# Patient Record
Sex: MALE | Race: WHITE | HISPANIC OR LATINO | Employment: FULL TIME | URBAN - METROPOLITAN AREA
[De-identification: names, ages, dates, MRNs, and addresses within clinical notes are randomized per-mention and may not be internally consistent; named-entity substitution may affect disease eponyms.]

---

## 2022-09-07 ENCOUNTER — OFFICE VISIT (OUTPATIENT)
Dept: INTERNAL MEDICINE CLINIC | Facility: CLINIC | Age: 57
End: 2022-09-07

## 2022-09-07 VITALS
SYSTOLIC BLOOD PRESSURE: 160 MMHG | RESPIRATION RATE: 17 BRPM | OXYGEN SATURATION: 99 % | HEIGHT: 71 IN | BODY MASS INDEX: 33.6 KG/M2 | DIASTOLIC BLOOD PRESSURE: 86 MMHG | HEART RATE: 78 BPM | WEIGHT: 240 LBS | TEMPERATURE: 98 F

## 2022-09-07 DIAGNOSIS — I10 BENIGN ESSENTIAL HYPERTENSION: Primary | ICD-10-CM

## 2022-09-07 DIAGNOSIS — G89.29 CHRONIC INTRACTABLE PAIN: ICD-10-CM

## 2022-09-07 DIAGNOSIS — M48.061 LUMBAR FORAMINAL STENOSIS: ICD-10-CM

## 2022-09-07 DIAGNOSIS — M51.36 DDD (DEGENERATIVE DISC DISEASE), LUMBAR: ICD-10-CM

## 2022-09-07 DIAGNOSIS — M51.26 LUMBAR DISC HERNIATION: ICD-10-CM

## 2022-09-07 DIAGNOSIS — G62.9 NEUROPATHY: ICD-10-CM

## 2022-09-07 PROBLEM — R33.9 INCOMPLETE EMPTYING OF BLADDER: Status: ACTIVE | Noted: 2022-04-06

## 2022-09-07 PROCEDURE — 99213 OFFICE O/P EST LOW 20 MIN: CPT | Performed by: INTERNAL MEDICINE

## 2022-09-07 RX ORDER — PREGABALIN 100 MG/1
100 CAPSULE ORAL 2 TIMES DAILY
COMMUNITY
End: 2022-10-10 | Stop reason: SDUPTHER

## 2022-09-07 RX ORDER — AMLODIPINE AND OLMESARTAN MEDOXOMIL 5; 40 MG/1; MG/1
1 TABLET ORAL DAILY
Qty: 90 TABLET | Refills: 1 | Status: SHIPPED | OUTPATIENT
Start: 2022-09-07

## 2022-09-07 RX ORDER — CARVEDILOL 6.25 MG/1
6.25 TABLET ORAL 2 TIMES DAILY WITH MEALS
COMMUNITY
End: 2022-10-10

## 2022-09-07 RX ORDER — ESCITALOPRAM OXALATE 20 MG/1
20 TABLET ORAL DAILY
COMMUNITY
End: 2022-10-10 | Stop reason: SDUPTHER

## 2022-09-07 RX ORDER — TRIAMTERENE AND HYDROCHLOROTHIAZIDE 37.5; 25 MG/1; MG/1
1 TABLET ORAL DAILY
COMMUNITY
End: 2022-10-10 | Stop reason: SDUPTHER

## 2022-09-07 RX ORDER — OXYCODONE AND ACETAMINOPHEN 10; 325 MG/1; MG/1
1 TABLET ORAL EVERY 6 HOURS PRN
Qty: 120 TABLET | Refills: 0 | Status: SHIPPED | OUTPATIENT
Start: 2022-09-07 | End: 2022-10-10 | Stop reason: SDUPTHER

## 2022-09-07 RX ORDER — MONTELUKAST SODIUM 10 MG/1
10 TABLET ORAL
COMMUNITY
End: 2022-10-10

## 2022-09-07 RX ORDER — OXYCODONE AND ACETAMINOPHEN 10; 325 MG/1; MG/1
1 TABLET ORAL EVERY 6 HOURS PRN
COMMUNITY
End: 2022-09-07 | Stop reason: SDUPTHER

## 2022-09-07 RX ORDER — FLUTICASONE PROPIONATE 50 MCG
1 SPRAY, SUSPENSION (ML) NASAL 2 TIMES DAILY
COMMUNITY
End: 2022-10-10 | Stop reason: SDUPTHER

## 2022-09-07 RX ORDER — CELECOXIB 200 MG/1
200 CAPSULE ORAL DAILY
COMMUNITY
End: 2022-09-16 | Stop reason: SDUPTHER

## 2022-09-07 RX ORDER — AMLODIPINE AND OLMESARTAN MEDOXOMIL 5; 40 MG/1; MG/1
1 TABLET ORAL DAILY
COMMUNITY
End: 2022-09-07 | Stop reason: SDUPTHER

## 2022-09-07 NOTE — PATIENT INSTRUCTIONS
CPt is symptom free for this problem    This diagnosis or problem is stable/well controlled  Patient is advised to continue same meds as outlined in medicine list  Pt is advised to continue to follow with specialist if they are following for this problme

## 2022-09-11 NOTE — ASSESSMENT & PLAN NOTE
Patient's pain without medication 9/10 with medication for over 10 with medication is able to work full-time and carry out activities

## 2022-09-11 NOTE — PROGRESS NOTES
Dr [facility] Office Visit Note  22     Aydin Diallo 64 y o  male MRN: [id number]  : 1965    Assessment:     1  Benign essential hypertension  Assessment & Plan:  Patient denies any headache difficulty breathing chest pain oral blood pressure for now seems to be under control continue Soni 540 and chlorthalidone 50/triamterene 25 mg daily along with Coreg close monitoring the blood pressure fidgeting dizziness shortness the blood pressure is lower side will discontinue Coreg discussed at length    Orders:  -     amlodipine-olmesartan (Soni) 5-40 MG; Take 1 tablet by mouth daily    2  Neuropathy  Assessment & Plan:  Symptoms seems to be controlled continue light weaker      3  DDD (degenerative disc disease), lumbar  Assessment & Plan:  Patient's pain is partially controlled about 60% relief with Celebrex continue Celebrex    Orders:  -     oxyCODONE-acetaminophen (PERCOCET)  mg per tablet; Take 1 tablet by mouth every 6 (six) hours as needed for severe pain Max Daily Amount: 4 tablets    4  Lumbar disc herniation  -     oxyCODONE-acetaminophen (PERCOCET)  mg per tablet; Take 1 tablet by mouth every 6 (six) hours as needed for severe pain Max Daily Amount: 4 tablets    5  Lumbar foraminal stenosis  Assessment & Plan:  Intractable pain without medication 9/10 with medication for over 10 patient is still working full-time and is able to function 90% as per patient with the medication at work continue current treatment    Orders:  -     oxyCODONE-acetaminophen (PERCOCET)  mg per tablet; Take 1 tablet by mouth every 6 (six) hours as needed for severe pain Max Daily Amount: 4 tablets    6  Chronic intractable pain  Assessment & Plan:  Patient's pain without medication /10 with medication for over 10 with medication is able to work full-time and carry out activities          Discussion Summary and Plan:   Today's care plan and medications were reviewed with patient in detail and all their questions answered to their satisfaction  Chief Complaint   Patient presents with    Hypertension    Back Pain    Pain     Pain management  Intractable back pain      Subjective:  Patient came in for follow-up with the pain management pain medication patient has been evaluated multiple times by pain management spine surgeon epidural injections in the past really minimal improvement recommended surgery patient should use refusing the surgery in lead 2 continue current management with the pain medicine patient's pain without medication is a 10/10 and with medication for over 10 and shaver to carry out day-to-day activities and also is able to work patient is working full-time no other new problems  BMI Counseling: Body mass index is 33 47 kg/m²  The BMI is above normal  Nutrition recommendations include decreasing portion sizes, encouraging healthy choices of fruits and vegetables, decreasing fast food intake, consuming healthier snacks, limiting drinks that contain sugar, moderation in carbohydrate intake, increasing intake of lean protein, reducing intake of saturated and trans fat and reducing intake of cholesterol  Exercise recommendations include exercising 3-5 times per week and strength training exercises  No pharmacotherapy was ordered  Patient referred to weight management  Rationale for BMI follow-up plan is due to patient being overweight or obese          PHQ-2/9 Depression Screening    Little interest or pleasure in doing things: 0 - not at all  Feeling down, depressed, or hopeless: 0 - not at all  PHQ-2 Score: 0  PHQ-2 Interpretation: Negative depression screen         The following portions of the patient's history were reviewed and updated as appropriate: allergies, current medications, past family history, past medical history, past social history, past surgical history and problem list     Review of Systems   Constitutional: Negative for activity change, appetite change, chills, diaphoresis, fatigue, fever and unexpected weight change  HENT: Negative for congestion, dental problem, drooling, ear discharge, ear pain, facial swelling, hearing loss, mouth sores, nosebleeds, postnasal drip, rhinorrhea, sinus pressure, sneezing, sore throat, tinnitus, trouble swallowing and voice change  Eyes: Negative for photophobia, pain, discharge, redness, itching and visual disturbance  Respiratory: Negative for apnea, cough, choking, chest tightness, shortness of breath, wheezing and stridor  Cardiovascular: Negative for chest pain, palpitations and leg swelling  Gastrointestinal: Negative for abdominal distention, abdominal pain, anal bleeding, blood in stool, constipation, diarrhea, nausea, rectal pain and vomiting  Endocrine: Negative for cold intolerance, heat intolerance, polydipsia, polyphagia and polyuria  Genitourinary: Negative for decreased urine volume, difficulty urinating, dysuria, enuresis, flank pain, frequency, genital sores, hematuria and urgency  Musculoskeletal: Positive for arthralgias, back pain, joint swelling and myalgias  Negative for gait problem, neck pain and neck stiffness  Skin: Negative for color change, pallor, rash and wound  Allergic/Immunologic: Negative  Negative for environmental allergies, food allergies and immunocompromised state  Neurological: Negative for dizziness, tremors, seizures, syncope, facial asymmetry, speech difficulty, weakness, light-headedness, numbness and headaches  Psychiatric/Behavioral: Negative for agitation, behavioral problems, confusion, decreased concentration, dysphoric mood, hallucinations, self-injury, sleep disturbance and suicidal ideas  The patient is not nervous/anxious and is not hyperactive            Historical Information   Patient Active Problem List   Diagnosis    Benign essential hypertension    Benign prostatic hyperplasia    DDD (degenerative disc disease), lumbar    Incomplete emptying of bladder    Lumbar disc herniation    Lumbar foraminal stenosis    Neuropathy    Chronic intractable pain     Past Medical History:   Diagnosis Date    Abdominal pain     Anxiety disorder     Arthritis     Back pain     Dizziness     Headache     Hypertension     Lightheadedness     Obesity     Palpitations     SOB (shortness of breath)      Past Surgical History:   Procedure Laterality Date    CHOLECYSTECTOMY      GASTRIC BYPASS       Social History     Substance and Sexual Activity   Alcohol Use None    Comment: none per Brenda [place]     Social History     Substance and Sexual Activity   Drug Use Not on file    Comment: none per Brenda [place]ers[place]     Social History     Tobacco Use   Smoking Status Never Smoker   Smokeless Tobacco Never Used     History reviewed  No pertinent family history    Health Maintenance Due   Topic    Hepatitis C Screening     Depression Screening     HIV Screening     BMI: Followup Plan     Annual Physical     Colorectal Cancer Screening     COVID-19 Vaccine (4 - Booster for Moderna series)    Influenza Vaccine (1)      Meds/Allergies       Current Outpatient Medications:     amlodipine-olmesartan (Soni) 5-40 MG, Take 1 tablet by mouth daily, Disp: 90 tablet, Rfl: 1    carvedilol (COREG) 6 25 mg tablet, Take 6 25 mg by mouth 2 (two) times a day with meals, Disp: , Rfl:     celecoxib (CeleBREX) 200 mg capsule, Take 200 mg by mouth daily, Disp: , Rfl:     escitalopram (LEXAPRO) 20 mg tablet, Take 20 mg by mouth daily, Disp: , Rfl:     fluticasone (FLONASE) 50 mcg/act nasal spray, 1 spray into each nostril 2 (two) times a day, Disp: , Rfl:     montelukast (Singulair) 10 mg tablet, Take 10 mg by mouth daily at bedtime, Disp: , Rfl:     oxyCODONE-acetaminophen (PERCOCET)  mg per tablet, Take 1 tablet by mouth every 6 (six) hours as needed for severe pain Max Daily Amount: 4 tablets, Disp: 120 tablet, Rfl: 0    pregabalin (LYRICA) 100 mg capsule, Take 100 mg by mouth 2 (two) times a day, Disp: , Rfl:     triamterene-hydrochlorothiazide (MAXZIDE-25) 37 5-25 mg per tablet, Take 1 tablet by mouth daily, Disp: , Rfl:       Objective:    Vitals:   /86 (BP Location: Left arm, Patient Position: Sitting, Cuff Size: Standard)   Pulse 78   Temp 98 °F (36 7 °C) (Temporal)   Resp 17   Ht 5' 11" (1 803 m)   Wt 109 kg (240 lb)   SpO2 99%   BMI 33 47 kg/m²   Body mass index is 33 47 kg/m²  Vitals:    09/07/22 1707   Weight: 109 kg (240 lb)       Physical Exam  Constitutional:       General: He is not in acute distress  Appearance: He is well-developed  He is not ill-appearing, toxic-appearing or diaphoretic  HENT:      Head: Normocephalic and atraumatic  Right Ear: External ear normal       Left Ear: External ear normal       Nose: Nose normal       Mouth/Throat:      Pharynx: No oropharyngeal exudate  Eyes:      General: Lids are normal  Lids are everted, no foreign bodies appreciated  No scleral icterus  Right eye: No discharge  Left eye: No discharge  Conjunctiva/sclera: Conjunctivae normal       Pupils: Pupils are equal, round, and reactive to light  Neck:      Thyroid: No thyromegaly  Vascular: Normal carotid pulses  No carotid bruit, hepatojugular reflux or JVD  Trachea: No tracheal tenderness or tracheal deviation  Cardiovascular:      Rate and Rhythm: Normal rate and regular rhythm  Pulses: Normal pulses  Heart sounds: Normal heart sounds  No murmur heard  No friction rub  No gallop  Pulmonary:      Effort: Pulmonary effort is normal  No respiratory distress  Breath sounds: Normal breath sounds  No stridor  No wheezing or rales  Chest:      Chest wall: No tenderness  Abdominal:      General: Bowel sounds are normal  There is no distension  Palpations: Abdomen is soft  There is no mass  Tenderness: There is no abdominal tenderness  There is no guarding or rebound     Musculoskeletal:         General: No tenderness or deformity  Normal range of motion  Cervical back: Normal range of motion and neck supple  No edema, erythema or rigidity  No spinous process tenderness or muscular tenderness  Normal range of motion  Lymphadenopathy:      Head:      Right side of head: No submental, submandibular, tonsillar, preauricular or posterior auricular adenopathy  Left side of head: No submental, submandibular, tonsillar, preauricular, posterior auricular or occipital adenopathy  Cervical: No cervical adenopathy  Right cervical: No superficial, deep or posterior cervical adenopathy  Left cervical: No superficial, deep or posterior cervical adenopathy  Upper Body:      Right upper body: No pectoral adenopathy  Left upper body: No pectoral adenopathy  Skin:     General: Skin is warm and dry  Coloration: Skin is not pale  Findings: No erythema or rash  Neurological:      Mental Status: He is alert and oriented to person, place, and time  Cranial Nerves: No cranial nerve deficit  Sensory: No sensory deficit  Motor: No tremor, abnormal muscle tone or seizure activity  Coordination: Coordination normal       Gait: Gait normal       Deep Tendon Reflexes: Reflexes are normal and symmetric  Reflexes normal    Psychiatric:         Behavior: Behavior normal          Thought Content: Thought content normal          Judgment: Judgment normal          Lab Review   No visits with results within 2 Month(s) from this visit  Latest known visit with results is:   No results found for any previous visit  Patient Instructions   CPt is symptom free for this problem    This diagnosis or problem is stable/well controlled  Patient is advised to continue same meds as outlined in medicine list  Pt is advised to continue to follow with specialist if they are following for this daren Platt MD        "This note has been constructed using a voice recognition system  Therefore there may be syntax, spelling, and/or grammatical errors   Please call if you have any questions  "

## 2022-09-11 NOTE — ASSESSMENT & PLAN NOTE
Intractable pain without medication 9/10 with medication for over 10 patient is still working full-time and is able to function 90% as per patient with the medication at work continue current treatment

## 2022-09-11 NOTE — ASSESSMENT & PLAN NOTE
Patient denies any headache difficulty breathing chest pain oral blood pressure for now seems to be under control continue Osni 540 and chlorthalidone 50/triamterene 25 mg daily along with Coreg close monitoring the blood pressure fidgeting dizziness shortness the blood pressure is lower side will discontinue Coreg discussed at length

## 2022-09-16 DIAGNOSIS — N40.0 BENIGN PROSTATIC HYPERPLASIA, UNSPECIFIED WHETHER LOWER URINARY TRACT SYMPTOMS PRESENT: Primary | ICD-10-CM

## 2022-09-16 DIAGNOSIS — N40.0 BENIGN PROSTATIC HYPERPLASIA, UNSPECIFIED WHETHER LOWER URINARY TRACT SYMPTOMS PRESENT: ICD-10-CM

## 2022-09-16 DIAGNOSIS — M51.36 DDD (DEGENERATIVE DISC DISEASE), LUMBAR: ICD-10-CM

## 2022-09-16 RX ORDER — TAMSULOSIN HYDROCHLORIDE 0.4 MG/1
0.4 CAPSULE ORAL
COMMUNITY
Start: 2022-04-06 | End: 2022-09-16 | Stop reason: SDUPTHER

## 2022-09-16 RX ORDER — CELECOXIB 200 MG/1
200 CAPSULE ORAL DAILY
Qty: 30 CAPSULE | Refills: 2 | Status: SHIPPED | OUTPATIENT
Start: 2022-09-16 | End: 2022-09-16 | Stop reason: SDUPTHER

## 2022-09-16 RX ORDER — CELECOXIB 200 MG/1
200 CAPSULE ORAL DAILY
Qty: 30 CAPSULE | Refills: 2 | Status: SHIPPED | OUTPATIENT
Start: 2022-09-16 | End: 2022-10-10 | Stop reason: SDUPTHER

## 2022-09-16 RX ORDER — TAMSULOSIN HYDROCHLORIDE 0.4 MG/1
0.4 CAPSULE ORAL
Qty: 30 CAPSULE | Refills: 2 | Status: SHIPPED | OUTPATIENT
Start: 2022-09-16 | End: 2022-09-16 | Stop reason: SDUPTHER

## 2022-09-16 RX ORDER — TAMSULOSIN HYDROCHLORIDE 0.4 MG/1
0.4 CAPSULE ORAL
Qty: 30 CAPSULE | Refills: 2 | Status: SHIPPED | OUTPATIENT
Start: 2022-09-16 | End: 2022-10-10 | Stop reason: SDUPTHER

## 2022-10-10 ENCOUNTER — OFFICE VISIT (OUTPATIENT)
Dept: INTERNAL MEDICINE CLINIC | Facility: CLINIC | Age: 57
End: 2022-10-10

## 2022-10-10 VITALS
SYSTOLIC BLOOD PRESSURE: 132 MMHG | OXYGEN SATURATION: 98 % | DIASTOLIC BLOOD PRESSURE: 78 MMHG | BODY MASS INDEX: 33.46 KG/M2 | WEIGHT: 239 LBS | HEIGHT: 71 IN | HEART RATE: 79 BPM

## 2022-10-10 DIAGNOSIS — M51.36 DDD (DEGENERATIVE DISC DISEASE), LUMBAR: ICD-10-CM

## 2022-10-10 DIAGNOSIS — J30.9 ALLERGIC RHINITIS, UNSPECIFIED SEASONALITY, UNSPECIFIED TRIGGER: ICD-10-CM

## 2022-10-10 DIAGNOSIS — M51.26 LUMBAR DISC HERNIATION: ICD-10-CM

## 2022-10-10 DIAGNOSIS — N40.0 BENIGN PROSTATIC HYPERPLASIA, UNSPECIFIED WHETHER LOWER URINARY TRACT SYMPTOMS PRESENT: ICD-10-CM

## 2022-10-10 DIAGNOSIS — F33.0 MILD EPISODE OF RECURRENT MAJOR DEPRESSIVE DISORDER (HCC): ICD-10-CM

## 2022-10-10 DIAGNOSIS — M48.061 LUMBAR FORAMINAL STENOSIS: ICD-10-CM

## 2022-10-10 DIAGNOSIS — H60.312 ACUTE DIFFUSE OTITIS EXTERNA OF LEFT EAR: ICD-10-CM

## 2022-10-10 DIAGNOSIS — I10 BENIGN ESSENTIAL HYPERTENSION: Primary | ICD-10-CM

## 2022-10-10 DIAGNOSIS — G62.9 NEUROPATHY: ICD-10-CM

## 2022-10-10 DIAGNOSIS — G89.29 CHRONIC INTRACTABLE PAIN: ICD-10-CM

## 2022-10-10 DIAGNOSIS — F33.9 EPISODE OF RECURRENT MAJOR DEPRESSIVE DISORDER, UNSPECIFIED DEPRESSION EPISODE SEVERITY (HCC): ICD-10-CM

## 2022-10-10 PROCEDURE — 99214 OFFICE O/P EST MOD 30 MIN: CPT | Performed by: INTERNAL MEDICINE

## 2022-10-10 RX ORDER — TAMSULOSIN HYDROCHLORIDE 0.4 MG/1
0.4 CAPSULE ORAL
Qty: 90 CAPSULE | Refills: 0 | Status: SHIPPED | OUTPATIENT
Start: 2022-10-10

## 2022-10-10 RX ORDER — NEOMYCIN SULFATE, POLYMYXIN B SULFATE AND HYDROCORTISONE 10; 3.5; 1 MG/ML; MG/ML; [USP'U]/ML
3 SUSPENSION/ DROPS AURICULAR (OTIC) 4 TIMES DAILY
Qty: 10 ML | Refills: 0 | Status: SHIPPED | OUTPATIENT
Start: 2022-10-10 | End: 2022-10-17

## 2022-10-10 RX ORDER — FLUTICASONE PROPIONATE 50 MCG
1 SPRAY, SUSPENSION (ML) NASAL 2 TIMES DAILY
Qty: 15.8 ML | Refills: 6 | Status: SHIPPED | OUTPATIENT
Start: 2022-10-10

## 2022-10-10 RX ORDER — OXYCODONE AND ACETAMINOPHEN 10; 325 MG/1; MG/1
1 TABLET ORAL EVERY 6 HOURS PRN
Qty: 120 TABLET | Refills: 0 | Status: SHIPPED | OUTPATIENT
Start: 2022-10-10

## 2022-10-10 RX ORDER — TRIAMTERENE AND HYDROCHLOROTHIAZIDE 37.5; 25 MG/1; MG/1
1 TABLET ORAL DAILY
Qty: 90 TABLET | Refills: 0 | Status: SHIPPED | OUTPATIENT
Start: 2022-10-10

## 2022-10-10 RX ORDER — ESCITALOPRAM OXALATE 20 MG/1
20 TABLET ORAL DAILY
Qty: 90 TABLET | Refills: 0 | Status: SHIPPED | OUTPATIENT
Start: 2022-10-10

## 2022-10-10 RX ORDER — CELECOXIB 200 MG/1
200 CAPSULE ORAL DAILY
Qty: 90 CAPSULE | Refills: 0 | Status: SHIPPED | OUTPATIENT
Start: 2022-10-10

## 2022-10-10 RX ORDER — PREGABALIN 100 MG/1
100 CAPSULE ORAL 2 TIMES DAILY
Qty: 180 CAPSULE | Refills: 0 | Status: SHIPPED | OUTPATIENT
Start: 2022-10-10

## 2022-10-10 NOTE — ASSESSMENT & PLAN NOTE
Intractable pain lower back radiating lower extremity patient is on Percocet 10/325 every 6 hour patient is working with this regimen full time and is able to carry out day-to-day activities

## 2022-10-10 NOTE — ASSESSMENT & PLAN NOTE
Intractable pain without medication 9/10 with medication for over 10 patient is still working full-time and is able to function pain relief 90% as per patient with the medication at work continue current treatment

## 2022-10-10 NOTE — PROGRESS NOTES
Dr Elmira Walsh Office Visit Note  10/10/22     [facility] Shay 64 y o  male MRN: [id number]  : 1965    Assessment:     1  Benign essential hypertension  Assessment & Plan:  Patient denies any headache difficulty breathing chest pain oral blood pressure for now seems to be under control continue Soni 540 and chlorthalidone 50/triamterene 25 mg daily along with Coreg close monitoring the blood pressure fidgeting dizziness patient's blood pressure improved dizziness improved after discontinuing Coreg    Orders:  -     triamterene-hydrochlorothiazide (MAXZIDE-25) 37 5-25 mg per tablet; Take 1 tablet by mouth daily    2  Benign prostatic hyperplasia, unspecified whether lower urinary tract symptoms present  Assessment & Plan:  Continue Flomax recommended to be seen by urologist partial symptom relief    Orders:  -     celecoxib (CeleBREX) 200 mg capsule; Take 1 capsule (200 mg total) by mouth daily  -     tamsulosin (FLOMAX) 0 4 mg; Take 1 capsule (0 4 mg total) by mouth daily with dinner    3  Neuropathy  -     pregabalin (LYRICA) 100 mg capsule; Take 1 capsule (100 mg total) by mouth 2 (two) times a day    4  Allergic rhinitis, unspecified seasonality, unspecified trigger  -     fluticasone (FLONASE) 50 mcg/act nasal spray; 1 spray into each nostril 2 (two) times a day    5  Episode of recurrent major depressive disorder, unspecified depression episode severity ([address] )  -     escitalopram (LEXAPRO) 20 mg tablet; Take 1 tablet (20 mg total) by mouth daily    6  Chronic intractable pain  -     escitalopram (LEXAPRO) 20 mg tablet; Take 1 tablet (20 mg total) by mouth daily    7  DDD (degenerative disc disease), lumbar  Assessment & Plan:  Patient's pain is partially controlled about 60% relief with Celebrex continue Celebrex    Orders:  -     oxyCODONE-acetaminophen (PERCOCET)  mg per tablet; Take 1 tablet by mouth every 6 (six) hours as needed for severe pain Max Daily Amount: 4 tablets    8   Lumbar disc herniation  Assessment & Plan:  Intractable pain lower back radiating lower extremity patient is on Percocet 10/325 every 6 hour patient is working with this regimen full time and is able to carry out day-to-day activities    Orders:  -     oxyCODONE-acetaminophen (PERCOCET)  mg per tablet; Take 1 tablet by mouth every 6 (six) hours as needed for severe pain Max Daily Amount: 4 tablets    9  Lumbar foraminal stenosis  Assessment & Plan:  Intractable pain without medication 9/10 with medication for over 10 patient is still working full-time and is able to function pain relief 90% as per patient with the medication at work continue current treatment    Orders:  -     oxyCODONE-acetaminophen (PERCOCET)  mg per tablet; Take 1 tablet by mouth every 6 (six) hours as needed for severe pain Max Daily Amount: 4 tablets    10  Acute diffuse otitis externa of left ear  -     neomycin-polymyxin-hydrocortisone (CORTISPORIN) 0 35%-10,000 units/mL-1% otic suspension; Administer 3 drops into the left ear 4 (four) times a day for 7 days    11  Mild episode of recurrent major depressive disorder [facility])        Discussion Summary and Plan: Today's care plan and medications were reviewed with patient in detail and all their questions answered to their satisfaction  No chief complaint on file       Subjective:  Patient came in complaining of left earache for last few day was trying to clean the ER with Q-tip also intractable back pain awaiting lower extremity taking Percocet 10/325 every 6 hour with this regimen patient has significant pain control able to work full-time and carry out day-to-day activities patient's blood pressure improved after discontinuing Coreg since the last visit was causing dizziness due to hypotensive hypotension which is resolved now      The following portions of the patient's history were reviewed and updated as appropriate: allergies, current medications, past family history, past medical history, past social history, past surgical history and problem list     Review of Systems   Constitutional: Negative for activity change, appetite change, chills, diaphoresis, fatigue, fever and unexpected weight change  HENT: Negative for congestion, dental problem, drooling, ear discharge, ear pain, facial swelling, hearing loss, mouth sores, nosebleeds, postnasal drip, rhinorrhea, sinus pressure, sneezing, sore throat, tinnitus, trouble swallowing and voice change  Eyes: Negative for photophobia, pain, discharge, redness, itching and visual disturbance  Respiratory: Negative for apnea, cough, choking, chest tightness, shortness of breath, wheezing and stridor  Cardiovascular: Negative for chest pain, palpitations and leg swelling  Gastrointestinal: Positive for abdominal distention  Negative for abdominal pain, anal bleeding, blood in stool, constipation, diarrhea, nausea, rectal pain and vomiting  Endocrine: Negative for cold intolerance, heat intolerance, polydipsia, polyphagia and polyuria  Genitourinary: Negative for decreased urine volume, difficulty urinating, dysuria, enuresis, flank pain, frequency, genital sores, hematuria and urgency  Musculoskeletal: Positive for arthralgias, back pain, joint swelling and myalgias  Negative for gait problem, neck pain and neck stiffness  Skin: Negative for color change, pallor, rash and wound  Allergic/Immunologic: Negative  Negative for environmental allergies, food allergies and immunocompromised state  Neurological: Negative for dizziness, tremors, seizures, syncope, facial asymmetry, speech difficulty, weakness, light-headedness, numbness and headaches  Psychiatric/Behavioral: Negative for agitation, behavioral problems, confusion, decreased concentration, dysphoric mood, hallucinations, self-injury, sleep disturbance and suicidal ideas  The patient is not nervous/anxious and is not hyperactive            Historical Information   Patient Active Problem List   Diagnosis   • Benign essential hypertension   • Benign prostatic hyperplasia   • DDD (degenerative disc disease), lumbar   • Incomplete emptying of bladder   • Lumbar disc herniation   • Lumbar foraminal stenosis   • Neuropathy   • Chronic intractable pain   • Acute diffuse otitis externa of left ear   • Mild episode of recurrent major depressive disorder (HCC)     Past Medical History:   Diagnosis Date   • Abdominal pain    • Anxiety disorder    • Arthritis    • Back pain    • Dizziness    • Headache    • Hypertension    • Lightheadedness    • Obesity    • Palpitations    • SOB (shortness of breath)      Past Surgical History:   Procedure Laterality Date   • CHOLECYSTECTOMY     • GASTRIC BYPASS       Social History     Substance and Sexual Activity   Alcohol Use None    Comment: none per [place]     Social History     Substance and Sexual Activity   Drug Use Not on file    Comment: none per [place]     Social History     Tobacco Use   Smoking Status Never Smoker   Smokeless Tobacco Never Used     History reviewed  No pertinent family history    Health Maintenance Due   Topic   • Hepatitis C Screening    • HIV Screening    • Annual Physical    • Colorectal Cancer Screening    • COVID-19 Vaccine (4 - Booster for Moderna series)   • Influenza Vaccine (1)      Meds/Allergies       Current Outpatient Medications:   •  amlodipine-olmesartan (Soni) 5-40 MG, Take 1 tablet by mouth daily, Disp: 90 tablet, Rfl: 1  •  celecoxib (CeleBREX) 200 mg capsule, Take 1 capsule (200 mg total) by mouth daily, Disp: 90 capsule, Rfl: 0  •  escitalopram (LEXAPRO) 20 mg tablet, Take 1 tablet (20 mg total) by mouth daily, Disp: 90 tablet, Rfl: 0  •  fluticasone (FLONASE) 50 mcg/act nasal spray, 1 spray into each nostril 2 (two) times a day, Disp: 15 8 mL, Rfl: 6  •  neomycin-polymyxin-hydrocortisone (CORTISPORIN) 0 35%-10,000 units/mL-1% otic suspension, Administer 3 drops into the left ear 4 (four) times a day for 7 days, Disp: 10 mL, Rfl: 0  •  oxyCODONE-acetaminophen (PERCOCET)  mg per tablet, Take 1 tablet by mouth every 6 (six) hours as needed for severe pain Max Daily Amount: 4 tablets, Disp: 120 tablet, Rfl: 0  •  pregabalin (LYRICA) 100 mg capsule, Take 1 capsule (100 mg total) by mouth 2 (two) times a day, Disp: 180 capsule, Rfl: 0  •  tamsulosin (FLOMAX) 0 4 mg, Take 1 capsule (0 4 mg total) by mouth daily with dinner, Disp: 90 capsule, Rfl: 0  •  triamterene-hydrochlorothiazide (MAXZIDE-25) 37 5-25 mg per tablet, Take 1 tablet by mouth daily, Disp: 90 tablet, Rfl: 0      Objective:    Vitals:   /78 (BP Location: Right arm, Patient Position: Sitting, Cuff Size: Standard)   Pulse 79   Ht 5' 11" (1 803 m)   Wt 108 kg (239 lb)   SpO2 98%   BMI 33 33 kg/m²   Body mass index is 33 33 kg/m²  Vitals:    10/10/22 1651   Weight: 108 kg (239 lb)       Physical Exam  Constitutional:       General: He is not in acute distress  Appearance: He is well-developed  He is not ill-appearing, toxic-appearing or diaphoretic  HENT:      Head: Normocephalic and atraumatic  Right Ear: External ear normal       Left Ear: External ear normal       Nose: Nose normal       Mouth/Throat:      Pharynx: No oropharyngeal exudate  Eyes:      General: Lids are normal  Lids are everted, no foreign bodies appreciated  No scleral icterus  Right eye: No discharge  Left eye: No discharge  Conjunctiva/sclera: Conjunctivae normal       Pupils: Pupils are equal, round, and reactive to light  Neck:      Thyroid: No thyromegaly  Vascular: Normal carotid pulses  No carotid bruit, hepatojugular reflux or JVD  Trachea: No tracheal tenderness or tracheal deviation  Cardiovascular:      Rate and Rhythm: Normal rate and regular rhythm  Pulses: Normal pulses  Heart sounds: Normal heart sounds  No murmur heard  No friction rub  No gallop     Pulmonary:      Effort: Pulmonary effort is normal  No respiratory distress  Breath sounds: Normal breath sounds  No stridor  No wheezing or rales  Chest:      Chest wall: No tenderness  Abdominal:      General: Bowel sounds are normal  There is no distension  Palpations: Abdomen is soft  There is no mass  Tenderness: There is no abdominal tenderness  There is no guarding or rebound  Musculoskeletal:         General: Deformity present  No tenderness  Normal range of motion  Cervical back: Normal range of motion and neck supple  No edema, erythema or rigidity  No spinous process tenderness or muscular tenderness  Normal range of motion  Lymphadenopathy:      Head:      Right side of head: No submental, submandibular, tonsillar, preauricular or posterior auricular adenopathy  Left side of head: No submental, submandibular, tonsillar, preauricular, posterior auricular or occipital adenopathy  Cervical: No cervical adenopathy  Right cervical: No superficial, deep or posterior cervical adenopathy  Left cervical: No superficial, deep or posterior cervical adenopathy  Upper Body:      Right upper body: No pectoral adenopathy  Left upper body: No pectoral adenopathy  Skin:     General: Skin is warm and dry  Coloration: Skin is not pale  Findings: No erythema or rash  Neurological:      Mental Status: He is alert and oriented to person, place, and time  Cranial Nerves: No cranial nerve deficit  Sensory: No sensory deficit  Motor: No tremor, abnormal muscle tone or seizure activity  Coordination: Coordination normal       Gait: Gait abnormal       Deep Tendon Reflexes: Reflexes are normal and symmetric  Reflexes normal    Psychiatric:         Behavior: Behavior normal          Thought Content: Thought content normal          Judgment: Judgment normal          Lab Review   No visits with results within 2 Month(s) from this visit     Latest known visit with results is:   No results found for any previous visit  Patient Instructions   Follow-up with orthopedist and pain management      Pt          Brandon Keith        "This note has been constructed using a voice recognition system  Therefore there may be syntax, spelling, and/or grammatical errors   Please call if you have any questions  "

## 2022-10-10 NOTE — ASSESSMENT & PLAN NOTE
Patient denies any headache difficulty breathing chest pain oral blood pressure for now seems to be under control continue Soni 540 and chlorthalidone 50/triamterene 25 mg daily along with Coreg close monitoring the blood pressure fidgeting dizziness patient's blood pressure improved dizziness improved after discontinuing Coreg

## 2022-12-05 ENCOUNTER — OFFICE VISIT (OUTPATIENT)
Dept: INTERNAL MEDICINE CLINIC | Facility: CLINIC | Age: 57
End: 2022-12-05

## 2022-12-05 VITALS
HEIGHT: 71 IN | OXYGEN SATURATION: 97 % | DIASTOLIC BLOOD PRESSURE: 80 MMHG | WEIGHT: 238 LBS | HEART RATE: 92 BPM | RESPIRATION RATE: 16 BRPM | SYSTOLIC BLOOD PRESSURE: 136 MMHG | TEMPERATURE: 98 F | BODY MASS INDEX: 33.32 KG/M2

## 2022-12-05 DIAGNOSIS — I10 BENIGN ESSENTIAL HYPERTENSION: Primary | ICD-10-CM

## 2022-12-05 DIAGNOSIS — M51.36 DDD (DEGENERATIVE DISC DISEASE), LUMBAR: ICD-10-CM

## 2022-12-05 DIAGNOSIS — N52.8 OTHER MALE ERECTILE DYSFUNCTION: ICD-10-CM

## 2022-12-05 DIAGNOSIS — M51.26 LUMBAR DISC HERNIATION: ICD-10-CM

## 2022-12-05 DIAGNOSIS — G62.9 NEUROPATHY: ICD-10-CM

## 2022-12-05 DIAGNOSIS — M48.061 LUMBAR FORAMINAL STENOSIS: ICD-10-CM

## 2022-12-05 RX ORDER — SILDENAFIL 100 MG/1
100 TABLET, FILM COATED ORAL DAILY PRN
Qty: 5 TABLET | Refills: 0 | Status: SHIPPED | OUTPATIENT
Start: 2022-12-05

## 2022-12-05 RX ORDER — OXYCODONE AND ACETAMINOPHEN 10; 325 MG/1; MG/1
1 TABLET ORAL EVERY 8 HOURS PRN
Qty: 90 TABLET | Refills: 0 | Status: SHIPPED | OUTPATIENT
Start: 2022-12-05

## 2022-12-05 NOTE — PROGRESS NOTES
Dr Beck Holcomb Office Visit Note  22     Lindsay Frias 64 y o  male MRN: [id number]  : 1965    Assessment:     1  Benign essential hypertension  Assessment & Plan:  Blood pressure controlled with amlodipine and Benicar combination 5/40 combination of triamterene hydrochlorothiazide and low-salt diet      2  DDD (degenerative disc disease), lumbar  Assessment & Plan:  Continue Celebrex symptoms partially controlled    Orders:  -     oxyCODONE-acetaminophen (PERCOCET)  mg per tablet; Take 1 tablet by mouth every 8 (eight) hours as needed for severe pain Max Daily Amount: 3 tablets    3   Lumbar disc herniation  Assessment & Plan:  Patient was in oxycodone 10 mg with Tylenol 325 4 times a day will cut down to 3 times a day advisor physical therapy and follow-up with the Pain Management patient has been evaluated multiple times by pain management epidural injection almost the no relief  All medical records reviewed and reconciled patient PDMP database reviewed to ensure compliant with the treatment plan for pain management benefits due to the fact that patient has not responded to other measures for pain control so far and severe the pain is significant and interfering with normal daily activities I believe it is reasonable and appropriate to continue the controlled substance in order to improve his ability to function in enhance quality of life the patient has signed a narcotic agreement patient should it was utilize 1 pharmacy and not receiving narcotic from any other provider patient verbalizes understanding that breaching the contract will affect future prescription decision making and critical results in the dismissal from the practice if specifically the has been explained in understood to patient patient demonstrates following informed use of appropriate medicine to analgesia increase activity explained the side effects recommended that thisto the patient patient understands that also advised that appeared to expose him order an abuse and addiction and overdose counseling provided for prevention of any overuse abuse or addiction    Orders:  -     oxyCODONE-acetaminophen (PERCOCET)  mg per tablet; Take 1 tablet by mouth every 8 (eight) hours as needed for severe pain Max Daily Amount: 3 tablets    4  Lumbar foraminal stenosis  -     oxyCODONE-acetaminophen (PERCOCET)  mg per tablet; Take 1 tablet by mouth every 8 (eight) hours as needed for severe pain Max Daily Amount: 3 tablets    5  Other male erectile dysfunction  Assessment & Plan:  Continue cut down the leg so per 10 mg possibly side effects and a trial of Viagra 100 mg as needed    Orders:  -     sildenafil (Viagra) 100 mg tablet; Take 1 tablet (100 mg total) by mouth daily as needed for erectile dysfunction    6  Neuropathy  Assessment & Plan:  Symptoms controlled continue lyrica            Discussion Summary and Plan: Today's care plan and medications were reviewed with patient in detail and all their questions answered to their satisfaction  No chief complaint on file  Subjective:  Patient came in for follow-up of the chronic intractable Pain Management for refill for Percocet 10/325 was taking 4 times a day now we will cut down to 3 times a day also erectile dysfunction patient was evaluated by urologist for prostatitis and BPH on Flomax for now denies any other new symptoms no other new medical problems      The following portions of the patient's history were reviewed and updated as appropriate: allergies, current medications, past family history, past medical history, past social history, past surgical history and problem list     Review of Systems   Constitutional: Negative for activity change, appetite change, chills, diaphoresis, fatigue, fever and unexpected weight change     HENT: Negative for congestion, dental problem, drooling, ear discharge, ear pain, facial swelling, hearing loss, mouth sores, nosebleeds, postnasal drip, rhinorrhea, sinus pressure, sneezing, sore throat, tinnitus, trouble swallowing and voice change  Eyes: Negative for photophobia, pain, discharge, redness, itching and visual disturbance  Respiratory: Negative for apnea, cough, choking, chest tightness, shortness of breath, wheezing and stridor  Cardiovascular: Negative for chest pain, palpitations and leg swelling  Gastrointestinal: Negative for abdominal distention, abdominal pain, anal bleeding, blood in stool, constipation, diarrhea, nausea, rectal pain and vomiting  Endocrine: Negative for cold intolerance, heat intolerance, polydipsia, polyphagia and polyuria  Genitourinary: Positive for difficulty urinating  Negative for decreased urine volume, dysuria, enuresis, flank pain, frequency, genital sores, hematuria and urgency  Erectile dysfunction   Musculoskeletal: Positive for arthralgias, back pain, gait problem, joint swelling, myalgias, neck pain and neck stiffness  Skin: Negative for color change, pallor, rash and wound  Allergic/Immunologic: Negative  Negative for environmental allergies, food allergies and immunocompromised state  Neurological: Positive for dizziness  Negative for tremors, seizures, syncope, facial asymmetry, speech difficulty, weakness, light-headedness, numbness and headaches  Psychiatric/Behavioral: Negative for agitation, behavioral problems, confusion, decreased concentration, dysphoric mood, hallucinations, self-injury, sleep disturbance and suicidal ideas  The patient is not nervous/anxious and is not hyperactive            Historical Information   Patient Active Problem List   Diagnosis   • Benign essential hypertension   • Benign prostatic hyperplasia   • DDD (degenerative disc disease), lumbar   • Incomplete emptying of bladder   • Lumbar disc herniation   • Lumbar foraminal stenosis   • Neuropathy   • Chronic intractable pain   • Acute diffuse otitis externa of left ear   • Mild episode of recurrent major depressive disorder ([facility] [address] )   • Hematospermia   • Prostatitis   • Other male erectile dysfunction     Past Medical History:   Diagnosis Date   • Abdominal pain    • Anxiety disorder    • Arthritis    • Back pain    • Dizziness    • Headache    • Hypertension    • Lightheadedness    • Obesity    • Palpitations    • SOB (shortness of breath)      Past Surgical History:   Procedure Laterality Date   • CHOLECYSTECTOMY     • GASTRIC BYPASS       Social History     Substance and Sexual Activity   Alcohol Use None    Comment: none per [place]     Social History     Substance and Sexual Activity   Drug Use Not on file    Comment: none per [place]     Social History     Tobacco Use   Smoking Status Never   Smokeless Tobacco Never     History reviewed  No pertinent family history    Health Maintenance Due   Topic   • Hepatitis C Screening    • Hepatitis B Vaccine (1 of 3 - 3-dose series)   • HIV Screening    • Annual Physical    • Colorectal Cancer Screening    • COVID-19 Vaccine (4 - Booster for Moderna series)      Meds/Allergies       Current Outpatient Medications:   •  amlodipine-olmesartan (Soni) 5-40 MG, Take 1 tablet by mouth daily, Disp: 90 tablet, Rfl: 1  •  celecoxib (CeleBREX) 200 mg capsule, Take 1 capsule (200 mg total) by mouth daily, Disp: 90 capsule, Rfl: 0  •  escitalopram (LEXAPRO) 20 mg tablet, Take 1 tablet (20 mg total) by mouth daily, Disp: 90 tablet, Rfl: 0  •  fluticasone (FLONASE) 50 mcg/act nasal spray, 1 spray into each nostril 2 (two) times a day, Disp: 15 8 mL, Rfl: 6  •  oxyCODONE-acetaminophen (PERCOCET)  mg per tablet, Take 1 tablet by mouth every 8 (eight) hours as needed for severe pain Max Daily Amount: 3 tablets, Disp: 90 tablet, Rfl: 0  •  pregabalin (LYRICA) 100 mg capsule, Take 1 capsule (100 mg total) by mouth 2 (two) times a day, Disp: 180 capsule, Rfl: 0  •  sildenafil (Viagra) 100 mg tablet, Take 1 tablet (100 mg total) by mouth daily as needed for erectile dysfunction, Disp: 5 tablet, Rfl: 0  •  tamsulosin (FLOMAX) 0 4 mg, Take 1 capsule (0 4 mg total) by mouth daily with dinner, Disp: 90 capsule, Rfl: 0  •  triamterene-hydrochlorothiazide (MAXZIDE-25) 37 5-25 mg per tablet, Take 1 tablet by mouth daily, Disp: 90 tablet, Rfl: 0  •  neomycin-polymyxin-hydrocortisone (CORTISPORIN) 0 35%-10,000 units/mL-1% otic suspension, Administer 3 drops into the left ear 4 (four) times a day for 7 days, Disp: 10 mL, Rfl: 0      Objective:    Vitals:   /80   Pulse 92   Temp 98 °F (36 7 °C)   Resp 16   Ht 5' 11" (1 803 m)   Wt 108 kg (238 lb)   SpO2 97%   BMI 33 19 kg/m²   Body mass index is 33 19 kg/m²  Vitals:    12/05/22 1519   Weight: 108 kg (238 lb)       Physical Exam  Constitutional:       General: He is not in acute distress  Appearance: He is well-developed and well-nourished  He is obese  He is not ill-appearing, toxic-appearing, sickly-appearing or diaphoretic  HENT:      Head: Normocephalic and atraumatic  Right Ear: External ear normal       Left Ear: External ear normal       Nose: Nose normal       Mouth/Throat:      Mouth: Oropharynx is clear and moist       Pharynx: No oropharyngeal exudate  Eyes:      General: Lids are normal  Lids are everted, no foreign bodies appreciated  No scleral icterus  Right eye: No discharge  Left eye: No discharge  Extraocular Movements: EOM normal       Conjunctiva/sclera: Conjunctivae normal       Pupils: Pupils are equal, round, and reactive to light  Neck:      Thyroid: No thyromegaly  Vascular: Normal carotid pulses  No carotid bruit, hepatojugular reflux or JVD  Trachea: No tracheal tenderness or tracheal deviation  Cardiovascular:      Rate and Rhythm: Normal rate and regular rhythm  Pulses: Normal pulses and intact distal pulses  Heart sounds: Normal heart sounds  No murmur heard  No friction rub  No gallop     Pulmonary:      Effort: Pulmonary effort is normal  No respiratory distress  Breath sounds: Normal breath sounds  No stridor  No wheezing or rales  Chest:      Chest wall: No tenderness  Abdominal:      General: Bowel sounds are normal  There is no distension or ascites  Palpations: Abdomen is soft  There is no mass  Tenderness: There is no abdominal tenderness  There is no guarding or rebound  Musculoskeletal:         General: Deformity present  No tenderness or edema  Normal range of motion  Cervical back: Normal range of motion and neck supple  No edema, erythema or rigidity  No spinous process tenderness or muscular tenderness  Normal range of motion  Lymphadenopathy:      Head:      Right side of head: No submental, submandibular, tonsillar, preauricular or posterior auricular adenopathy  Left side of head: No submental, submandibular, tonsillar, preauricular, posterior auricular or occipital adenopathy  Cervical: No cervical adenopathy  Right cervical: No superficial, deep or posterior cervical adenopathy  Left cervical: No superficial, deep or posterior cervical adenopathy  Upper Body:      Right upper body: No pectoral or lateral adenopathy  Left upper body: No pectoral or lateral adenopathy  Skin:     General: Skin is warm and dry  Coloration: Skin is not pale  Findings: No erythema or rash  Neurological:      Mental Status: He is alert and oriented to person, place, and time  Cranial Nerves: No cranial nerve deficit  Sensory: No sensory deficit  Motor: Motor strength is normal  Weakness present  No tremor, abnormal muscle tone or seizure activity  Coordination: He displays a negative Romberg sign  Coordination normal       Gait: Gait abnormal       Deep Tendon Reflexes: Reflexes are normal and symmetric  Reflexes normal    Psychiatric:         Mood and Affect: Mood and affect normal          Behavior: Behavior normal          Thought Content:  Thought content normal          Judgment: Judgment normal          Lab Review   No visits with results within 2 Month(s) from this visit  Latest known visit with results is:   No results found for any previous visit  Patient Instructions   Back Pain   WHAT YOU NEED TO KNOW:   What do I need to know about back pain? Back pain is common  You may have back pain and muscle spasms  You may feel sore or stiff on one or both sides of your back  The pain may spread to your lower body  Conditions that affect the spine, joints, or muscles can cause back pain  These may include arthritis, spinal stenosis (narrowing of the spinal column), muscle tension, or breakdown of the spinal discs  What increases my risk for back pain? · Repeated bending, lifting, or twisting, or lifting heavy items    · Injury from a fall or accident    · Lack of regular physical activity     · Obesity or pregnancy     · Smoking    · Aging    · Driving, sitting, or standing for long periods    · Bad posture while sitting or standing    How is back pain diagnosed? Your healthcare provider will ask if you have any medical conditions  He or she may ask if you have a history of back pain and how it started  He or she may watch you stand and walk, and check your range of motion  Show him or her where you feel pain and what makes it better or worse  Describe the pain, how bad it is, and how long it lasts  Tell your provider if your pain worsens at night or when you lie on your back  How is back pain treated? 1  Medicines:      ? NSAIDs , such as ibuprofen, help decrease swelling, pain, and fever  This medicine is available with or without a doctor's order  NSAIDs may be given as a pill or as a cream that is applied to your back  NSAIDs can cause stomach bleeding or kidney problems in certain people  If you take blood thinner medicine, always ask your healthcare provider if NSAIDs are safe for you   Always read the medicine label and follow directions  ? Acetaminophen  decreases pain and fever  It is available without a doctor's order  Ask how much to take and how often to take it  Follow directions  Read the labels of all other medicines you are using to see if they also contain acetaminophen, or ask your doctor or pharmacist  Acetaminophen can cause liver damage if not taken correctly  Do not use more than 4 grams (4,000 milligrams) total of acetaminophen in one day  ? Muscle relaxers  help decrease muscle spasms and back pain  2  Acupressure  may be recommended to decrease pain and improve movement  Acupressure is pressure or localized massage to the area of your back pain  3  A transcutaneous electrical nerve stimulation (TENS) unit  is a portable, pocket-sized, battery-powered device that attaches to your skin  It is usually placed over the area of pain  It uses mild, safe electrical signals to help control pain  How do I manage my back pain? · Apply ice  on your back for 15 to 20 minutes every hour or as directed  Use an ice pack, or put crushed ice in a plastic bag  Cover it with a towel before you apply it to your skin  Ice helps prevent tissue damage and decreases pain  · Apply heat  on your back for 20 to 30 minutes every 2 hours for as many days as directed  Heat helps decrease pain and muscle spasms  · Stay active  as much as you can without causing more pain  Bed rest could make your back pain worse  Avoid heavy lifting until your pain is gone  · Go to physical therapy as directed  A physical therapist can teach you exercises to help improve movement and strength, and to decrease pain  Call your local emergency number (911 in the [address] [place] [address],[address]) if:   · You have severe back pain with chest pain  · You cannot control your urine or bowel movements  · Your pain becomes so severe that you cannot walk  When should I seek immediate care? · You have pain, numbness, or weakness in one or both legs      · You have severe back pain, nausea, and vomiting  · You have severe back pain that spreads to your side or genital area  When should I call my doctor? · You have back pain that does not get better with rest and pain medicine  · You have a fever  · You have pain that worsens when you are on your back or when you rest     · You have pain that worsens when you cough or sneeze  · You lose weight without trying  · You have questions or concerns about your condition or care  CARE AGREEMENT:   You have the right to help plan your care  Learn about your health condition and how it may be treated  Discuss treatment options with your healthcare providers to decide what care you want to receive  You always have the right to refuse treatment  The above information is an  only  It is not intended as medical advice for individual conditions or treatments  Talk to your doctor, nurse or pharmacist before following any medical regimen to see if it is safe and effective for you  © Copyright [employer] 2022 Information is for End User's use only and may not be sold, redistributed or otherwise used for commercial purposes  All illustrations and images included in CareNotes® are the copyrighted property of [employer]  or Gallito Mckeon MD        "This note has been constructed using a voice recognition system  Therefore there may be syntax, spelling, and/or grammatical errors   Please call if you have any questions  "

## 2022-12-05 NOTE — ASSESSMENT & PLAN NOTE
Patient was in oxycodone 10 mg with Tylenol 325 4 times a day will cut down to 3 times a day advisor physical therapy and follow-up with the Pain Management patient has been evaluated multiple times by pain management epidural injection almost the no relief  All medical records reviewed and reconciled patient PDMP database reviewed to ensure compliant with the treatment plan for pain management benefits due to the fact that patient has not responded to other measures for pain control so far and severe the pain is significant and interfering with normal daily activities I believe it is reasonable and appropriate to continue the controlled substance in order to improve his ability to function in enhance quality of life the patient has signed a narcotic agreement patient should it was utilize 1 pharmacy and not receiving narcotic from any other provider patient verbalizes understanding that breaching the contract will affect future prescription decision making and critical results in the dismissal from the practice if specifically the has been explained in understood to patient patient demonstrates following informed use of appropriate medicine to analgesia increase activity explained the side effects recommended that thisto the patient patient understands that also advised that appeared to expose him order an abuse and addiction and overdose counseling provided for prevention of any overuse abuse or addiction

## 2022-12-05 NOTE — PATIENT INSTRUCTIONS
Back Pain   WHAT YOU NEED TO KNOW:   What do I need to know about back pain? Back pain is common  You may have back pain and muscle spasms  You may feel sore or stiff on one or both sides of your back  The pain may spread to your lower body  Conditions that affect the spine, joints, or muscles can cause back pain  These may include arthritis, spinal stenosis (narrowing of the spinal column), muscle tension, or breakdown of the spinal discs  What increases my risk for back pain? Repeated bending, lifting, or twisting, or lifting heavy items    Injury from a fall or accident    Lack of regular physical activity     Obesity or pregnancy     Smoking    Aging    Driving, sitting, or standing for long periods    Bad posture while sitting or standing    How is back pain diagnosed? Your healthcare provider will ask if you have any medical conditions  He or she may ask if you have a history of back pain and how it started  He or she may watch you stand and walk, and check your range of motion  Show him or her where you feel pain and what makes it better or worse  Describe the pain, how bad it is, and how long it lasts  Tell your provider if your pain worsens at night or when you lie on your back  How is back pain treated? Medicines:      NSAIDs , such as ibuprofen, help decrease swelling, pain, and fever  This medicine is available with or without a doctor's order  NSAIDs may be given as a pill or as a cream that is applied to your back  NSAIDs can cause stomach bleeding or kidney problems in certain people  If you take blood thinner medicine, always ask your healthcare provider if NSAIDs are safe for you  Always read the medicine label and follow directions  Acetaminophen  decreases pain and fever  It is available without a doctor's order  Ask how much to take and how often to take it  Follow directions   Read the labels of all other medicines you are using to see if they also contain acetaminophen, or ask your doctor or pharmacist  Acetaminophen can cause liver damage if not taken correctly  Do not use more than 4 grams (4,000 milligrams) total of acetaminophen in one day  Muscle relaxers  help decrease muscle spasms and back pain  Acupressure  may be recommended to decrease pain and improve movement  Acupressure is pressure or localized massage to the area of your back pain  A transcutaneous electrical nerve stimulation (TENS) unit  is a portable, pocket-sized, battery-powered device that attaches to your skin  It is usually placed over the area of pain  It uses mild, safe electrical signals to help control pain  How do I manage my back pain? Apply ice  on your back for 15 to 20 minutes every hour or as directed  Use an ice pack, or put crushed ice in a plastic bag  Cover it with a towel before you apply it to your skin  Ice helps prevent tissue damage and decreases pain  Apply heat  on your back for 20 to 30 minutes every 2 hours for as many days as directed  Heat helps decrease pain and muscle spasms  Stay active  as much as you can without causing more pain  Bed rest could make your back pain worse  Avoid heavy lifting until your pain is gone  Go to physical therapy as directed  A physical therapist can teach you exercises to help improve movement and strength, and to decrease pain  Call your local emergency number (911 in the [address] [facility],[address]) if:   You have severe back pain with chest pain  You cannot control your urine or bowel movements  Your pain becomes so severe that you cannot walk  When should I seek immediate care? You have pain, numbness, or weakness in one or both legs  You have severe back pain, nausea, and vomiting  You have severe back pain that spreads to your side or genital area  When should I call my doctor? You have back pain that does not get better with rest and pain medicine  You have a fever      You have pain that worsens when you are on your back or when you rest     You have pain that worsens when you cough or sneeze  You lose weight without trying  You have questions or concerns about your condition or care  CARE AGREEMENT:   You have the right to help plan your care  Learn about your health condition and how it may be treated  Discuss treatment options with your healthcare providers to decide what care you want to receive  You always have the right to refuse treatment  The above information is an  only  It is not intended as medical advice for individual conditions or treatments  Talk to your doctor, nurse or pharmacist before following any medical regimen to see if it is safe and effective for you  © Copyright [employer] 2022 Information is for End User's use only and may not be sold, redistributed or otherwise used for commercial purposes   All illustrations and images included in CareNotes® are the copyrighted property of [employer]  or [address] [place] [address]

## 2022-12-05 NOTE — ASSESSMENT & PLAN NOTE
Blood pressure controlled with amlodipine and Benicar combination 5/40 combination of triamterene hydrochlorothiazide and low-salt diet

## 2022-12-06 ENCOUNTER — TELEPHONE (OUTPATIENT)
Dept: ADMINISTRATIVE | Facility: OTHER | Age: 57
End: 2022-12-06

## 2022-12-06 NOTE — TELEPHONE ENCOUNTER
----- Message from Addie Santa MD sent at 12/5/2022  3:30 PM EST -----  12/05/22 3:31 PM    Hello, our patient Lindsay Frias has had CRC: Colonoscopy completed/performed   Please assist in updating the patient chart by making an External outreach to  [facility] located in [place] The date of service is about 3 months ago    Thank you,  Addie Santa MD   [address]

## 2022-12-07 NOTE — TELEPHONE ENCOUNTER
Upon review of the In Basket request we were able to locate, review, and update the patient chart as requested for CRC: Colonoscopy  Any additional questions or concerns should be emailed to the Practice Liaisons via the appropriate education email address, please do not reply via In Basket      Thank you  Marcus Patient, MA

## 2022-12-09 PROBLEM — H60.312 ACUTE DIFFUSE OTITIS EXTERNA OF LEFT EAR: Status: RESOLVED | Noted: 2022-10-10 | Resolved: 2022-12-09

## 2023-01-03 ENCOUNTER — OFFICE VISIT (OUTPATIENT)
Dept: INTERNAL MEDICINE CLINIC | Facility: CLINIC | Age: 58
End: 2023-01-03

## 2023-01-03 VITALS
TEMPERATURE: 97.8 F | SYSTOLIC BLOOD PRESSURE: 138 MMHG | RESPIRATION RATE: 17 BRPM | DIASTOLIC BLOOD PRESSURE: 80 MMHG | OXYGEN SATURATION: 95 % | BODY MASS INDEX: 33.6 KG/M2 | HEIGHT: 71 IN | WEIGHT: 240 LBS

## 2023-01-03 DIAGNOSIS — J20.9 ACUTE INFECTIVE TRACHEOBRONCHITIS: ICD-10-CM

## 2023-01-03 DIAGNOSIS — N40.1 BENIGN PROSTATIC HYPERPLASIA WITH URINARY FREQUENCY: ICD-10-CM

## 2023-01-03 DIAGNOSIS — Z00.00 ANNUAL PHYSICAL EXAM: ICD-10-CM

## 2023-01-03 DIAGNOSIS — M51.26 LUMBAR DISC HERNIATION: ICD-10-CM

## 2023-01-03 DIAGNOSIS — G62.9 NEUROPATHY: ICD-10-CM

## 2023-01-03 DIAGNOSIS — F33.0 MILD EPISODE OF RECURRENT MAJOR DEPRESSIVE DISORDER (HCC): ICD-10-CM

## 2023-01-03 DIAGNOSIS — M51.36 DDD (DEGENERATIVE DISC DISEASE), LUMBAR: ICD-10-CM

## 2023-01-03 DIAGNOSIS — I10 BENIGN ESSENTIAL HYPERTENSION: ICD-10-CM

## 2023-01-03 DIAGNOSIS — R35.0 BENIGN PROSTATIC HYPERPLASIA WITH URINARY FREQUENCY: ICD-10-CM

## 2023-01-03 DIAGNOSIS — J01.10 ACUTE NON-RECURRENT FRONTAL SINUSITIS: ICD-10-CM

## 2023-01-03 DIAGNOSIS — M48.061 LUMBAR FORAMINAL STENOSIS: Primary | ICD-10-CM

## 2023-01-03 RX ORDER — METHYLPREDNISOLONE 4 MG/1
TABLET ORAL
Qty: 21 EACH | Refills: 0 | Status: SHIPPED | OUTPATIENT
Start: 2023-01-03

## 2023-01-03 RX ORDER — CEFUROXIME AXETIL 500 MG/1
500 TABLET ORAL EVERY 12 HOURS SCHEDULED
Qty: 14 TABLET | Refills: 0 | Status: SHIPPED | OUTPATIENT
Start: 2023-01-03 | End: 2023-01-10

## 2023-01-03 RX ORDER — OXYCODONE AND ACETAMINOPHEN 10; 325 MG/1; MG/1
1 TABLET ORAL EVERY 8 HOURS PRN
Qty: 90 TABLET | Refills: 0 | Status: SHIPPED | OUTPATIENT
Start: 2023-01-03

## 2023-01-03 NOTE — LETTER
January 3, 2023     Patient: Lilia Levine  YOB: 1965  Date of Visit: 1/3/2023      To Whom it May Concern:    Lilia Levine is under my professional care  Marilu Potter was seen in my office on 1/3/2023  Marilu Potter is advised no work on January 4, 2023    If you have any questions or concerns, please don't hesitate to call           Sincerely,          India Arana MD        CC: No Recipients

## 2023-01-03 NOTE — ASSESSMENT & PLAN NOTE
Patient came in with thick yellowish nasal discharge with sinus congestion frontal headache treated with Ceftin 500 twice a day and Flonase

## 2023-01-03 NOTE — ASSESSMENT & PLAN NOTE
Patient on Percocet 10/325 4 times a day was prescribed 1 month supply been treated with epidural injection no improvement also received physical therapy no improvement for now continue current therapy  All medical records reviewed and reconciled patient PDMP database reviewed to ensure compliant with the treatment plan for pain management benefits due to the fact that patient has not responded to other measures for pain control so far and severe the pain is significant and interfering with normal daily activities I believe it is reasonable and appropriate to continue the controlled substance in order to improve his ability to function in enhance quality of life the patient has signed a narcotic agreement patient should it was utilize 1 pharmacy and not receiving narcotic from any other provider patient verbalizes understanding that breaching the contract will affect future prescription decision making and critical results in the dismissal from the practice if specifically the has been explained in understood to patient patient demonstrates following informed use of appropriate medicine to analgesia increase activity explained the side effects recommended that thisto the patient patient understands that also advised that appeared to expose him order an abuse and addiction and overdose counseling provided for prevention of any overuse abuse or addiction

## 2023-01-03 NOTE — PATIENT INSTRUCTIONS
Acute Cough   WHAT YOU NEED TO KNOW:   An acute cough can last up to 3 weeks  Common causes of an acute cough include a cold, allergies, or a lung infection  DISCHARGE INSTRUCTIONS:   Return to the emergency department if:   · You have trouble breathing or feel short of breath  · You cough up blood, or you see blood in your mucus  · You faint or feel weak or dizzy  · You have chest pain when you cough or take a deep breath  · You have new wheezing  Contact your healthcare provider if:   · You have a fever  · Your cough lasts longer than 4 weeks  · Your symptoms do not improve with treatment  · You have questions or concerns about your condition or care  Medicines:   · Medicines  may be needed to stop the cough, decrease swelling in your airways, or help open your airways  Medicine may also be given to help you cough up mucus  Ask your healthcare provider what over-the-counter medicines you can take  If you have an infection caused by bacteria, you may need antibiotics  · Take your medicine as directed  Contact your healthcare provider if you think your medicine is not helping or if you have side effects  Tell him or her if you are allergic to any medicine  Keep a list of the medicines, vitamins, and herbs you take  Include the amounts, and when and why you take them  Bring the list or the pill bottles to follow-up visits  Carry your medicine list with you in case of an emergency  Manage your symptoms:   · Do not smoke and stay away from others who smoke  Nicotine and other chemicals in cigarettes and cigars can cause lung damage and make your cough worse  Ask your healthcare provider for information if you currently smoke and need help to quit  E-cigarettes or smokeless tobacco still contain nicotine  Talk to your healthcare provider before you use these products  · Drink extra liquids as directed  Liquids will help thin and loosen mucus so you can cough it up   Liquids will also help prevent dehydration  Examples of good liquids to drink include water, fruit juice, and broth  Do not drink liquids that contain caffeine  Caffeine can increase your risk for dehydration  Ask your healthcare provider how much liquid to drink each day  · Rest as directed  Do not do activities that make your cough worse, such as exercise  · Use a humidifier or vaporizer  Use a cool mist humidifier or a vaporizer to increase air moisture in your home  This may make it easier for you to breathe and help decrease your cough  · Eat 2 to 5 mL of honey 2 times each day  Honey can help thin mucus and decrease your cough  · Use cough drops or lozenges  These can help decrease throat irritation and your cough  Follow up with your healthcare provider as directed:  Write down your questions so you remember to ask them during your visits  © Copyright [employer] 2022 Information is for End User's use only and may not be sold, redistributed or otherwise used for commercial purposes  All illustrations and images included in CareNotes® are the copyrighted property of [employer]  or [address] [place], [place] [id number]   The above information is an  only  It is not intended as medical advice for individual conditions or treatments  Talk to your doctor, nurse or pharmacist before following any medical regimen to see if it is safe and effective for you  Wellness Visit for Adults   AMBULATORY CARE:   A wellness visit  is when you see your healthcare provider to get screened for health problems  Your healthcare provider will also give you advice on how to stay healthy  Write down your questions so you remember to ask them  Ask your healthcare provider how often you should have a wellness visit  What happens at a wellness visit:  Your healthcare provider will ask about your health, and your family history of health problems  This includes high blood pressure, heart disease, and cancer   He or she will ask if you have symptoms that concern you, if you smoke, and about your mood  You may also be asked about your intake of medicines, supplements, food, and alcohol  Any of the following may be done:  · Your weight  will be checked  Your height may also be checked so your body mass index (BMI) can be calculated  Your BMI shows if you are at a healthy weight  · Your blood pressure  and heart rate will be checked  Your temperature may also be checked  · Blood and urine tests  may be done  Blood tests may be done to check your cholesterol levels  Abnormal cholesterol levels increase your risk for heart disease and stroke  You may also need a blood or urine test to check for diabetes if you are at increased risk  Urine tests may be done to look for signs of an infection or kidney disease  · A physical exam  includes checking your heartbeat and lungs with a stethoscope  Your healthcare provider may also check your skin to look for sun damage  · Screening tests  may be recommended  A screening test is done to check for diseases that may not cause symptoms  The screening tests you may need depend on your age, gender, family history, and lifestyle habits  For example, colorectal screening may be recommended if you are 48years old or older  Screening tests you need if you are a woman:   · A Pap smear  is used to screen for cervical cancer  Pap smears are usually done every 3 to 5 years depending on your age  You may need them more often if you have had abnormal Pap smear test results in the past  Ask your healthcare provider how often you should have a Pap smear  · A mammogram  is an x-ray of your breasts to screen for breast cancer  Experts recommend mammograms every 2 years starting at age 48 years  You may need a mammogram at age 52 years or younger if you have an increased risk for breast cancer   Talk to your healthcare provider about when you should start having mammograms and how often you need them     Vaccines you may need:   · Get an influenza vaccine  every year  The influenza vaccine protects you from the flu  Several types of viruses cause the flu  The viruses change over time, so new vaccines are made each year  · Get a tetanus-diphtheria (Td) booster vaccine  every 10 years  This vaccine protects you against tetanus and diphtheria  Tetanus is a severe infection that may cause painful muscle spasms and lockjaw  Diphtheria is a severe bacterial infection that causes a thick covering in the back of your mouth and throat  · Get a human papillomavirus (HPV) vaccine  if you are female and aged 23 to 32 or male 23 to 24 and never received it  This vaccine protects you from HPV infection  HPV is the most common infection spread by sexual contact  HPV may also cause vaginal, penile, and anal cancers  · Get a pneumococcal vaccine  if you are aged 72 years or older  The pneumococcal vaccine is an injection given to protect you from pneumococcal disease  Pneumococcal disease is an infection caused by pneumococcal bacteria  The infection may cause pneumonia, meningitis, or an ear infection  · Get a shingles vaccine  if you are 60 or older, even if you have had shingles before  The shingles vaccine is an injection to protect you from the varicella-zoster virus  This is the same virus that causes chickenpox  Shingles is a painful rash that develops in people who had chickenpox or have been exposed to the virus  How to eat healthy:  My Plate is a model for planning healthy meals  It shows the types and amounts of foods that should go on your plate  Fruits and vegetables make up about half of your plate, and grains and protein make up the other half  A serving of dairy is included on the side of your plate  The amount of calories and serving sizes you need depends on your age, gender, weight, and height   Examples of healthy foods are listed below:  · Eat a variety of vegetables  such as dark green, red, and orange vegetables  You can also include canned vegetables low in sodium (salt) and frozen vegetables without added butter or sauces  · Eat a variety of fresh fruits , canned fruit in 100% juice, frozen fruit, and dried fruit  · Include whole grains  At least half of the grains you eat should be whole grains  Examples include whole-wheat bread, wheat pasta, brown rice, and whole-grain cereals such as oatmeal     · Eat a variety of protein foods such as seafood (fish and shellfish), lean meat, and poultry without skin (turkey and chicken)  Examples of lean meats include pork leg, shoulder, or tenderloin, and beef round, sirloin, tenderloin, and extra lean ground beef  Other protein foods include eggs and egg substitutes, beans, peas, soy products, nuts, and seeds  · Choose low-fat dairy products such as skim or 1% milk or low-fat yogurt, cheese, and cottage cheese  · Limit unhealthy fats  such as butter, hard margarine, and shortening  Exercise:  Exercise at least 30 minutes per day on most days of the week  Some examples of exercise include walking, biking, dancing, and swimming  You can also fit in more physical activity by taking the stairs instead of the elevator or parking farther away from stores  Include muscle strengthening activities 2 days each week  Regular exercise provides many health benefits  It helps you manage your weight, and decreases your risk for type 2 diabetes, heart disease, stroke, and high blood pressure  Exercise can also help improve your mood  Ask your healthcare provider about the best exercise plan for you  General health and safety guidelines:   · Do not smoke  Nicotine and other chemicals in cigarettes and cigars can cause lung damage  Ask your healthcare provider for information if you currently smoke and need help to quit  E-cigarettes or smokeless tobacco still contain nicotine   Talk to your healthcare provider before you use these products  · Limit alcohol  A drink of alcohol is 12 ounces of beer, 5 ounces of wine, or 1½ ounces of liquor  · Lose weight, if needed  Being overweight increases your risk of certain health conditions  These include heart disease, high blood pressure, type 2 diabetes, and certain types of cancer  · Protect your skin  Do not sunbathe or use tanning beds  Use sunscreen with a SPF 15 or higher  Apply sunscreen at least 15 minutes before you go outside  Reapply sunscreen every 2 hours  Wear protective clothing, hats, and sunglasses when you are outside  · Drive safely  Always wear your seatbelt  Make sure everyone in your car wears a seatbelt  A seatbelt can save your life if you are in an accident  Do not use your cell phone when you are driving  This could distract you and cause an accident  Pull over if you need to make a call or send a text message  · Practice safe sex  Use latex condoms if are sexually active and have more than one partner  Your healthcare provider may recommend screening tests for sexually transmitted infections (STIs)  · Wear helmets, lifejackets, and protective gear  Always wear a helmet when you ride a bike or motorcycle, go skiing, or play sports that could cause a head injury  Wear protective equipment when you play sports  Wear a lifejacket when you are on a boat or doing water sports  © Copyright [employer] 2022 Information is for End User's use only and may not be sold, redistributed or otherwise used for commercial purposes  All illustrations and images included in CareNotes® are the copyrighted property of [employer]  or Gabriela Rojo   The above information is an  only  It is not intended as medical advice for individual conditions or treatments  Talk to your doctor, nurse or pharmacist before following any medical regimen to see if it is safe and effective for you

## 2023-01-03 NOTE — ASSESSMENT & PLAN NOTE
Patient pain controlled with Percocet 10/325 4 times a day evaluated and treated by pain management with physical therapy no improvement for now maintenance pain control with Percocet 10/325 4 times a day was given prescription for    Patient was in oxycodone 10 mg with Tylenol 325 4 times a day will cut down to 3 times a day advisor physical therapy and follow-up with the Pain Management patient has been evaluated multiple times by pain management epidural injection almost the no relief  All medical records reviewed and reconciled patient PDMP database reviewed to ensure compliant with the treatment plan for pain management benefits due to the fact that patient has not responded to other measures for pain control so far and severe the pain is significant and interfering with normal daily activities I believe it is reasonable and appropriate to continue the controlled substance in order to improve his ability to function in enhance quality of life the patient has signed a narcotic agreement patient should it was utilize 1 pharmacy and not receiving narcotic from any other provider patient verbalizes understanding that breaching the contract will affect future prescription decision making and critical results in the dismissal from the practice if specifically the has been explained in understood to patient patient demonstrates following informed use of appropriate medicine to analgesia increase activity explained the side effects recommended that thisto the patient patient understands that also advised that appeared to expose him order an abuse and addiction and overdose counseling provided for prevention of any overuse abuse or addiction

## 2023-01-03 NOTE — ASSESSMENT & PLAN NOTE
Annual physical done all of the screening counseling follow-up recommendations see attached annual physical

## 2023-01-03 NOTE — PROGRESS NOTES
ADULT ANNUAL [address] INTERNAL MEDICINE    NAME: Madeleine Ferrell  AGE: 62 y o   SEX: male  : 1965     DATE: 1/3/2023     Assessment and Plan:     Problem List Items Addressed This Visit        Respiratory    Acute non-recurrent frontal sinusitis     Patient came in with thick yellowish nasal discharge with sinus congestion frontal headache treated with Ceftin 500 twice a day and Flonase         Relevant Medications    cefuroxime (CEFTIN) 500 mg tablet    Acute infective tracheobronchitis     Intractable coughing scanty yellowish sputum treated with Ceftin Medrol Dosepak over-the-counter cough medicine tested negative for COVID-19 and influenza         Relevant Medications    cefuroxime (CEFTIN) 500 mg tablet    methylPREDNISolone 4 MG tablet therapy pack       Cardiovascular and Mediastinum    Benign essential hypertension     Blood pressure controlled continue low-salt diet amlodipine and Maxide            Nervous and Auditory    Neuropathy     Continue Lyrica symptoms controlled            Musculoskeletal and Integument    DDD (degenerative disc disease), lumbar    Relevant Medications    oxyCODONE-acetaminophen (PERCOCET)  mg per tablet    Lumbar disc herniation     Patient pain controlled with Percocet 10/325 4 times a day evaluated and treated by pain management with physical therapy no improvement for now maintenance pain control with Percocet 10/325 4 times a day was given prescription for    Patient was in oxycodone 10 mg with Tylenol 325 4 times a day will cut down to 3 times a day advisor physical therapy and follow-up with the Pain Management patient has been evaluated multiple times by pain management epidural injection almost the no relief  All medical records reviewed and reconciled patient [address] reviewed to ensure compliant with the treatment plan for pain management benefits due to the fact that patient has not responded to other measures for pain control so far and severe the pain is significant and interfering with normal daily activities I believe it is reasonable and appropriate to continue the controlled substance in order to improve his ability to function in enhance quality of life the patient has signed a narcotic agreement patient should it was utilize 1 pharmacy and not receiving narcotic from any other provider patient verbalizes understanding that breaching the contract will affect future prescription decision making and critical results in the dismissal from the practice if specifically the has been explained in understood to patient patient demonstrates following informed use of appropriate medicine to analgesia increase activity explained the side effects recommended that thisto the patient patient understands that also advised that appeared to expose him order an abuse and addiction and overdose counseling provided for prevention of any overuse abuse or addiction         Relevant Medications    oxyCODONE-acetaminophen (PERCOCET)  mg per tablet    Lumbar foraminal stenosis - Primary     Patient on Percocet 10/325 4 times a day was prescribed 1 month supply been treated with epidural injection no improvement also received physical therapy no improvement for now continue current therapy  All medical records reviewed and reconciled patient PDMP database reviewed to ensure compliant with the treatment plan for pain management benefits due to the fact that patient has not responded to other measures for pain control so far and severe the pain is significant and interfering with normal daily activities I believe it is reasonable and appropriate to continue the controlled substance in order to improve his ability to function in enhance quality of life the patient has signed a narcotic agreement patient should it was utilize 1 pharmacy and not receiving narcotic from any other provider patient verbalizes understanding that breaching the contract will affect future prescription decision making and critical results in the dismissal from the practice if specifically the has been explained in understood to patient patient demonstrates following informed use of appropriate medicine to analgesia increase activity explained the side effects recommended that thisto the patient patient understands that also advised that appeared to expose him order an abuse and addiction and overdose counseling provided for prevention of any overuse abuse or addiction         Relevant Medications    oxyCODONE-acetaminophen (PERCOCET)  mg per tablet       Genitourinary    Benign prostatic hyperplasia     Symptoms controlled with Flomax patient followed by urologist            Other    Mild episode of recurrent major depressive disorder ([facility] [address] )     Continue Lexapro symptoms controlled         Annual physical exam     Annual physical done all of the screening counseling follow-up recommendations see attached annual physical            Immunizations and preventive care screenings were discussed with patient today  Appropriate education was printed on patient's after visit summary  Discussed risks and benefits of prostate cancer screening  We discussed the controversial history of PSA screening for prostate cancer in the [place] as well as the risk of over detection and over treatment of prostate cancer by way of PSA screening  The patient understands that PSA blood testing is an imperfect way to screen for prostate cancer and that elevated PSA levels in the blood may also be caused by infection, inflammation, prostatic trauma or manipulation, urological procedures, or by benign prostatic enlargement  The role of the digital rectal examination in prostate cancer screening was also discussed and I discussed with him that there is large interobserver variability in the findings of digital rectal examination      Counseling:  Alcohol/drug use: discussed moderation in alcohol intake, the recommendations for healthy alcohol use, and avoidance of illicit drug use  Dental Health: discussed importance of regular tooth brushing, flossing, and dental visits  Injury prevention: discussed safety/seat belts, safety helmets, smoke detectors, carbon dioxide detectors, and smoking near bedding or upholstery  Sexual health: discussed sexually transmitted diseases, partner selection, use of condoms, avoidance of unintended pregnancy, and contraceptive alternatives  Exercise: the importance of regular exercise/physical activity was discussed  Recommend exercise 3-5 times per week for at least 30 minutes  Return in about 1 month (around 2/3/2023)  Chief Complaint:     Chief Complaint   Patient presents with   • Medication Refill     Wheezing coughing since Saturday  Did a covid test Saturday and Sunday came back negative  History of Present Illness:   Patient came in complaining of sinus congestion thick yellowish nasal discharge coughing scanty expectoration minimal difficulty breathing for last 3 days tested negative for COVID and influenza as per patient also chronic intractable pain for the refill of the medication Percocet 10/325 4 times a day which helps to control the pain so much able to work full-time and carry out day-to-day activities  Adult Annual Physical   Patient here for a comprehensive physical exam  The patient reports problems - Chronic intractable pain due to lumbar radiculopathy  Diet and Physical Activity  · Diet/Nutrition: well balanced diet, heart healthy (low sodium) diet, limited junk food, high fat diet, consuming 3-5 servings of fruits/vegetables daily, adequate fiber intake, adequate whole grain intake and Patient followed by urologist    · Exercise: walking, moderate cardiovascular exercise, 1-2 times a week on average and Patient followed by urology        Depression Screening  PHQ-2/9 Depression Screening General Health  · Sleep: sleeps poorly  · Hearing: decreased - none   · Vision: no vision problems  · Dental: regular dental visits   Health  · Symptoms include: erectile dysfunction     Review of Systems:     Review of Systems   Constitutional: Positive for fatigue  Negative for activity change, appetite change, chills, diaphoresis, fever and unexpected weight change  HENT: Positive for congestion, postnasal drip, rhinorrhea, sinus pressure, sinus pain and sore throat  Negative for dental problem, drooling, ear discharge, ear pain, facial swelling, hearing loss, mouth sores, nosebleeds, sneezing, tinnitus, trouble swallowing and voice change  Eyes: Negative for photophobia, pain, discharge, redness, itching and visual disturbance  Respiratory: Positive for cough  Negative for apnea, choking, chest tightness, shortness of breath, wheezing and stridor  Cardiovascular: Negative for chest pain, palpitations and leg swelling  Gastrointestinal: Negative for abdominal distention, abdominal pain, anal bleeding, blood in stool, constipation, diarrhea, nausea, rectal pain and vomiting  Endocrine: Negative for cold intolerance, heat intolerance, polydipsia, polyphagia and polyuria  Genitourinary: Negative for decreased urine volume, difficulty urinating, dysuria, enuresis, flank pain, frequency, genital sores, hematuria and urgency  Musculoskeletal: Positive for arthralgias, back pain, gait problem, joint swelling and myalgias  Negative for neck pain and neck stiffness  Skin: Negative for color change, pallor, rash and wound  Allergic/Immunologic: Negative  Negative for environmental allergies, food allergies and immunocompromised state  Neurological: Negative for dizziness, tremors, seizures, syncope, facial asymmetry, speech difficulty, weakness, light-headedness, numbness and headaches     Psychiatric/Behavioral: Negative for agitation, behavioral problems, confusion, decreased concentration, dysphoric mood, hallucinations, self-injury, sleep disturbance and suicidal ideas  The patient is not nervous/anxious and is not hyperactive  Past Medical History:     Past Medical History:   Diagnosis Date   • Abdominal pain    • Anxiety disorder    • Arthritis    • Back pain    • Dizziness    • Headache    • Hypertension    • Lightheadedness    • Obesity    • Palpitations    • SOB (shortness of breath)       Past Surgical History:     Past Surgical History:   Procedure Laterality Date   • CHOLECYSTECTOMY     • GASTRIC BYPASS        Family History:     History reviewed  No pertinent family history     Social History:     Social History     Socioeconomic History   • Marital status: /Civil Union     Spouse name: None   • Number of children: None   • Years of education: None   • Highest education level: None   Occupational History   • None   Tobacco Use   • Smoking status: Never   • Smokeless tobacco: Never   Substance and Sexual Activity   • Alcohol use: None     Comment: none per Trish   • Drug use: None     Comment: none per Trish   • Sexual activity: None   Other Topics Concern   • None   Social History Narrative   • None     Social Determinants of Health     Financial Resource Strain: Not on file   Food Insecurity: Not on file   Transportation Needs: Not on file   Physical Activity: Not on file   Stress: Not on file   Social Connections: Not on file   Intimate Partner Violence: Not on file   Housing Stability: Not on file      Current Medications:     Current Outpatient Medications   Medication Sig Dispense Refill   • amlodipine-olmesartan (Soni) 5-40 MG Take 1 tablet by mouth daily 90 tablet 1   • cefuroxime (CEFTIN) 500 mg tablet Take 1 tablet (500 mg total) by mouth every 12 (twelve) hours for 7 days 14 tablet 0   • celecoxib (CeleBREX) 200 mg capsule Take 1 capsule (200 mg total) by mouth daily 90 capsule 0   • escitalopram (LEXAPRO) 20 mg tablet Take 1 tablet (20 mg total) by mouth daily 90 tablet 0   • fluticasone (FLONASE) 50 mcg/act nasal spray 1 spray into each nostril 2 (two) times a day 15 8 mL 6   • methylPREDNISolone 4 MG tablet therapy pack Use as directed on package 21 each 0   • oxyCODONE-acetaminophen (PERCOCET)  mg per tablet Take 1 tablet by mouth every 8 (eight) hours as needed for severe pain Max Daily Amount: 3 tablets 90 tablet 0   • pregabalin (LYRICA) 100 mg capsule Take 1 capsule (100 mg total) by mouth 2 (two) times a day 180 capsule 0   • sildenafil (Viagra) 100 mg tablet Take 1 tablet (100 mg total) by mouth daily as needed for erectile dysfunction 5 tablet 0   • tamsulosin (FLOMAX) 0 4 mg Take 1 capsule (0 4 mg total) by mouth daily with dinner 90 capsule 0   • triamterene-hydrochlorothiazide (MAXZIDE-25) 37 5-25 mg per tablet Take 1 tablet by mouth daily 90 tablet 0   • neomycin-polymyxin-hydrocortisone (CORTISPORIN) 0 35%-10,000 units/mL-1% otic suspension Administer 3 drops into the left ear 4 (four) times a day for 7 days 10 mL 0     No current facility-administered medications for this visit  Allergies:     No Known Allergies   Physical Exam:     /80   Temp 97 8 °F (36 6 °C)   Resp 17   Ht 5' 11" (1 803 m)   Wt 109 kg (240 lb)   SpO2 95%   BMI 33 47 kg/m²     Physical Exam  Vitals and nursing note reviewed  Constitutional:       General: He is not in acute distress  Appearance: He is well-developed  HENT:      Head: Normocephalic and atraumatic  Nose: Congestion and rhinorrhea present  Mouth/Throat:      Pharynx: Oropharyngeal exudate present  Eyes:      Conjunctiva/sclera: Conjunctivae normal    Cardiovascular:      Rate and Rhythm: Regular rhythm  Tachycardia present  Heart sounds: No murmur heard  Pulmonary:      Effort: Pulmonary effort is normal  No respiratory distress  Breath sounds: Rhonchi present  Abdominal:      Palpations: Abdomen is soft  Tenderness: There is no abdominal tenderness  Musculoskeletal:         General: No swelling  Cervical back: Neck supple  Skin:     General: Skin is warm and dry  Capillary Refill: Capillary refill takes less than 2 seconds  Neurological:      General: No focal deficit present  Mental Status: He is alert  Motor: Weakness present        Gait: Gait abnormal    Psychiatric:         Mood and Affect: Mood normal           Esthela Mahan MD  [facility] INTERNAL MEDICINE

## 2023-01-03 NOTE — ASSESSMENT & PLAN NOTE
Intractable coughing scanty yellowish sputum treated with Ceftin Medrol Dosepak over-the-counter cough medicine tested negative for COVID-19 and influenza

## 2023-01-23 DIAGNOSIS — I10 BENIGN ESSENTIAL HYPERTENSION: ICD-10-CM

## 2023-01-23 DIAGNOSIS — N40.0 BENIGN PROSTATIC HYPERPLASIA, UNSPECIFIED WHETHER LOWER URINARY TRACT SYMPTOMS PRESENT: ICD-10-CM

## 2023-01-23 RX ORDER — AMLODIPINE AND OLMESARTAN MEDOXOMIL 5; 40 MG/1; MG/1
1 TABLET ORAL DAILY
Qty: 90 TABLET | Refills: 1 | Status: SHIPPED | OUTPATIENT
Start: 2023-01-23

## 2023-01-23 RX ORDER — CELECOXIB 200 MG/1
200 CAPSULE ORAL DAILY
Qty: 90 CAPSULE | Refills: 0 | Status: SHIPPED | OUTPATIENT
Start: 2023-01-23

## 2023-01-23 RX ORDER — TRIAMTERENE AND HYDROCHLOROTHIAZIDE 37.5; 25 MG/1; MG/1
1 TABLET ORAL DAILY
Qty: 90 TABLET | Refills: 0 | Status: SHIPPED | OUTPATIENT
Start: 2023-01-23

## 2023-01-23 RX ORDER — TAMSULOSIN HYDROCHLORIDE 0.4 MG/1
0.4 CAPSULE ORAL
Qty: 90 CAPSULE | Refills: 0 | Status: SHIPPED | OUTPATIENT
Start: 2023-01-23

## 2023-01-30 ENCOUNTER — OFFICE VISIT (OUTPATIENT)
Dept: INTERNAL MEDICINE CLINIC | Facility: CLINIC | Age: 58
End: 2023-01-30

## 2023-01-30 VITALS
DIASTOLIC BLOOD PRESSURE: 82 MMHG | RESPIRATION RATE: 17 BRPM | HEART RATE: 78 BPM | OXYGEN SATURATION: 98 % | BODY MASS INDEX: 33.6 KG/M2 | WEIGHT: 240 LBS | SYSTOLIC BLOOD PRESSURE: 140 MMHG | TEMPERATURE: 98 F | HEIGHT: 71 IN

## 2023-01-30 DIAGNOSIS — Z13.0 SCREENING FOR DEFICIENCY ANEMIA: ICD-10-CM

## 2023-01-30 DIAGNOSIS — E78.2 MIXED HYPERLIPIDEMIA: ICD-10-CM

## 2023-01-30 DIAGNOSIS — M51.36 DDD (DEGENERATIVE DISC DISEASE), LUMBAR: ICD-10-CM

## 2023-01-30 DIAGNOSIS — M51.26 LUMBAR DISC HERNIATION: ICD-10-CM

## 2023-01-30 DIAGNOSIS — N40.1 BENIGN PROSTATIC HYPERPLASIA WITH URINARY FREQUENCY: ICD-10-CM

## 2023-01-30 DIAGNOSIS — M48.061 LUMBAR FORAMINAL STENOSIS: ICD-10-CM

## 2023-01-30 DIAGNOSIS — R35.0 BENIGN PROSTATIC HYPERPLASIA WITH URINARY FREQUENCY: ICD-10-CM

## 2023-01-30 DIAGNOSIS — E55.9 VITAMIN D DEFICIENCY: ICD-10-CM

## 2023-01-30 DIAGNOSIS — G89.29 CHRONIC INTRACTABLE PAIN: ICD-10-CM

## 2023-01-30 DIAGNOSIS — G62.9 NEUROPATHY: ICD-10-CM

## 2023-01-30 DIAGNOSIS — R73.9 HYPERGLYCEMIA: ICD-10-CM

## 2023-01-30 DIAGNOSIS — R05.3 CHRONIC COUGH: ICD-10-CM

## 2023-01-30 DIAGNOSIS — I10 BENIGN ESSENTIAL HYPERTENSION: Primary | ICD-10-CM

## 2023-01-30 PROBLEM — M17.0 PRIMARY OSTEOARTHRITIS OF BOTH KNEES: Status: ACTIVE | Noted: 2023-01-30

## 2023-01-30 RX ORDER — OXYCODONE AND ACETAMINOPHEN 10; 325 MG/1; MG/1
1 TABLET ORAL EVERY 8 HOURS PRN
Qty: 90 TABLET | Refills: 0 | Status: SHIPPED | OUTPATIENT
Start: 2023-01-30

## 2023-01-30 RX ORDER — METHYLPREDNISOLONE 4 MG/1
TABLET ORAL
Qty: 21 EACH | Refills: 0 | Status: SHIPPED | OUTPATIENT
Start: 2023-01-30

## 2023-01-30 NOTE — ASSESSMENT & PLAN NOTE
No change plan as follows from my previous progress note  Patient on Percocet 10/325 4 times a day was prescribed 1 month supply been treated with epidural injection no improvement also received physical therapy no improvement for now continue current therapy  All medical records reviewed and reconciled patient PDMP database reviewed to ensure compliant with the treatment plan for pain management benefits due to the fact that patient has not responded to other measures for pain control so far and severe the pain is significant and interfering with normal daily activities I believe it is reasonable and appropriate to continue the controlled substance in order to improve his ability to function in enhance quality of life the patient has signed a narcotic agreement patient should it was utilize 1 pharmacy and not receiving narcotic from any other provider patient verbalizes understanding that breaching the contract will affect future prescription decision making and critical results in the dismissal from the practice if specifically the has been explained in understood to patient patient demonstrates following informed use of appropriate medicine to analgesia increase activity explained the side effects recommended that thisto the patient patient understands that also advised that appeared to expose him order an abuse and addiction and overdose counseling provided for prevention of any overuse abuse or addiction

## 2023-01-30 NOTE — PATIENT INSTRUCTIONS
Acute Cough   WHAT YOU NEED TO KNOW:   An acute cough can last up to 3 weeks  Common causes of an acute cough include a cold, allergies, or a lung infection  DISCHARGE INSTRUCTIONS:   Return to the emergency department if:   You have trouble breathing or feel short of breath  You cough up blood, or you see blood in your mucus  You faint or feel weak or dizzy  You have chest pain when you cough or take a deep breath  You have new wheezing  Contact your healthcare provider if:   You have a fever  Your cough lasts longer than 4 weeks  Your symptoms do not improve with treatment  You have questions or concerns about your condition or care  Medicines:   Medicines  may be needed to stop the cough, decrease swelling in your airways, or help open your airways  Medicine may also be given to help you cough up mucus  Ask your healthcare provider what over-the-counter medicines you can take  If you have an infection caused by bacteria, you may need antibiotics  Take your medicine as directed  Contact your healthcare provider if you think your medicine is not helping or if you have side effects  Tell him or her if you are allergic to any medicine  Keep a list of the medicines, vitamins, and herbs you take  Include the amounts, and when and why you take them  Bring the list or the pill bottles to follow-up visits  Carry your medicine list with you in case of an emergency  Manage your symptoms:   Do not smoke and stay away from others who smoke  Nicotine and other chemicals in cigarettes and cigars can cause lung damage and make your cough worse  Ask your healthcare provider for information if you currently smoke and need help to quit  E-cigarettes or smokeless tobacco still contain nicotine  Talk to your healthcare provider before you use these products  Drink extra liquids as directed  Liquids will help thin and loosen mucus so you can cough it up   Liquids will also help prevent dehydration  Examples of good liquids to drink include water, fruit juice, and broth  Do not drink liquids that contain caffeine  Caffeine can increase your risk for dehydration  Ask your healthcare provider how much liquid to drink each day  Rest as directed  Do not do activities that make your cough worse, such as exercise  Use a humidifier or vaporizer  Use a cool mist humidifier or a vaporizer to increase air moisture in your home  This may make it easier for you to breathe and help decrease your cough  Eat 2 to 5 mL of honey 2 times each day  Honey can help thin mucus and decrease your cough  Use cough drops or lozenges  These can help decrease throat irritation and your cough  Follow up with your healthcare provider as directed:  Write down your questions so you remember to ask them during your visits  © Copyright [employer] 2022 Information is for End User's use only and may not be sold, redistributed or otherwise used for commercial purposes  All illustrations and images included in CareNotes® are the copyrighted property of A D A M , Inc  or [address] [place], [place] [address]jm [place]jo   The above information is an  only  It is not intended as medical advice for individual conditions or treatments  Talk to your doctor, nurse or pharmacist before following any medical regimen to see if it is safe and effective for you

## 2023-01-30 NOTE — ASSESSMENT & PLAN NOTE
Evaluated by orthopedist undergoing cortisone injection some relief on Percocet 10/325 1 every 6 hour eventually will need t knee replacement both side Celebrex

## 2023-01-30 NOTE — ASSESSMENT & PLAN NOTE
Continue Percocet 10/325 1 every 6 hours patient's pain control somewhat so able to work full-time and carry out day-to-day activity

## 2023-03-04 PROBLEM — J01.10 ACUTE NON-RECURRENT FRONTAL SINUSITIS: Status: RESOLVED | Noted: 2023-01-03 | Resolved: 2023-03-04

## 2023-03-04 PROBLEM — J20.9 ACUTE INFECTIVE TRACHEOBRONCHITIS: Status: RESOLVED | Noted: 2023-01-03 | Resolved: 2023-03-04

## 2023-03-07 ENCOUNTER — OFFICE VISIT (OUTPATIENT)
Dept: INTERNAL MEDICINE CLINIC | Facility: CLINIC | Age: 58
End: 2023-03-07

## 2023-03-07 VITALS
BODY MASS INDEX: 34.3 KG/M2 | HEART RATE: 100 BPM | SYSTOLIC BLOOD PRESSURE: 146 MMHG | HEIGHT: 71 IN | DIASTOLIC BLOOD PRESSURE: 80 MMHG | WEIGHT: 245 LBS | TEMPERATURE: 98 F | OXYGEN SATURATION: 98 % | RESPIRATION RATE: 16 BRPM

## 2023-03-07 DIAGNOSIS — G89.29 CHRONIC INTRACTABLE PAIN: ICD-10-CM

## 2023-03-07 DIAGNOSIS — I10 BENIGN ESSENTIAL HYPERTENSION: Primary | ICD-10-CM

## 2023-03-07 DIAGNOSIS — N52.9 ERECTILE DYSFUNCTION, UNSPECIFIED ERECTILE DYSFUNCTION TYPE: ICD-10-CM

## 2023-03-07 DIAGNOSIS — N40.1 BENIGN PROSTATIC HYPERPLASIA WITH URINARY FREQUENCY: ICD-10-CM

## 2023-03-07 DIAGNOSIS — M48.061 LUMBAR FORAMINAL STENOSIS: ICD-10-CM

## 2023-03-07 DIAGNOSIS — R35.0 BENIGN PROSTATIC HYPERPLASIA WITH URINARY FREQUENCY: ICD-10-CM

## 2023-03-07 DIAGNOSIS — M51.36 DDD (DEGENERATIVE DISC DISEASE), LUMBAR: ICD-10-CM

## 2023-03-07 DIAGNOSIS — G62.9 NEUROPATHY: ICD-10-CM

## 2023-03-07 DIAGNOSIS — M51.26 LUMBAR DISC HERNIATION: ICD-10-CM

## 2023-03-07 RX ORDER — SILDENAFIL 100 MG/1
100 TABLET, FILM COATED ORAL DAILY PRN
Qty: 10 TABLET | Refills: 55 | Status: SHIPPED | OUTPATIENT
Start: 2023-03-07

## 2023-03-07 RX ORDER — OXYCODONE AND ACETAMINOPHEN 10; 325 MG/1; MG/1
1 TABLET ORAL EVERY 8 HOURS PRN
Qty: 90 TABLET | Refills: 0 | Status: SHIPPED | OUTPATIENT
Start: 2023-03-07

## 2023-03-07 NOTE — PROGRESS NOTES
BMI Counseling: Body mass index is 34 17 kg/m²  The BMI is above normal  Nutrition recommendations include decreasing portion sizes, encouraging healthy choices of fruits and vegetables, decreasing fast food intake, consuming healthier snacks, limiting drinks that contain sugar, moderation in carbohydrate intake, increasing intake of lean protein, reducing intake of saturated and trans fat and reducing intake of cholesterol  Exercise recommendations include moderate physical activity 150 minutes/week and exercising 3-5 times per week  No pharmacotherapy was ordered  Rationale for BMI follow-up plan is due to patient being overweight or obese

## 2023-03-07 NOTE — ASSESSMENT & PLAN NOTE
Chronic intractable pain due to lumbar radiculopathy and lumbar spine stenosis managed by Percocet 10/325 4 times a day  All medical records reviewed and reconciled patient PDMP database reviewed to ensure compliant with the treatment plan for pain management benefits due to the fact that patient has not responded to other measures for pain control so far and severe the pain is significant and interfering with normal daily activities I believe it is reasonable and appropriate to continue the controlled substance in order to improve his ability to function in enhance quality of life the patient has signed a narcotic agreement patient should it was utilize 1 pharmacy and not receiving narcotic from any other provider patient verbalizes understanding that breaching the contract will affect future prescription decision making and critical results in the dismissal from the practice if specifically the has been explained in understood to patient patient demonstrates following informed use of appropriate medicine to analgesia increase activity explained the side effects recommended that thisto the patient patient understands that also advised that appeared to expose him order an abuse and addiction and overdose counseling provided for prevention of any overuse abuse or addiction

## 2023-03-07 NOTE — PATIENT INSTRUCTIONS
Chronic Pain   WHAT YOU NEED TO KNOW:   Chronic pain is pain that does not get better for 3 months or longer  Chronic pain may hurt all the time, or come and go  DISCHARGE INSTRUCTIONS:   Call your local emergency number, or have someone else call (911 in the [address]) if:   You are breathing slower than normal, or you have trouble breathing  You cannot be awakened  You have a seizure  Call your doctor if:   Your heart feels like it is jumping or fluttering  You cannot think clearly  You have side effects from prescription pain medicine, such as itching, nausea, or vomiting  You have trouble sleeping  Your pain gets worse, even after you take medicine  You don't think the medicine is working  You have questions or concerns about your condition or care  Medicines: You may need any of the following:  Acetaminophen  decreases pain and fever  It is available without a doctor's order  Ask how much to take and how often to take it  Follow directions  Read the labels of all other medicines you are using to see if they also contain acetaminophen, or ask your doctor or pharmacist  Acetaminophen can cause liver damage if not taken correctly  NSAIDs , such as ibuprofen, help decrease swelling, pain, and fever  This medicine is available with or without a doctor's order  NSAIDs can cause stomach bleeding or kidney problems in certain people  If you take blood thinner medicine, always ask your healthcare provider if NSAIDs are safe for you  Always read the medicine label and follow directions  Prescription pain medicine  called narcotics or opioids may be given for certain types of chronic pain  Ask your healthcare provider how to take this medicine safely  Anesthetics  can be rubbed on your skin or injected into a nerve or muscle to numb an area  Other medicines  may reduce pain, anxiety, muscle tension, or swelling  Take your medicine as directed    Contact your healthcare provider if you think your medicine is not helping or if you have side effects  Tell your provider if you are allergic to any medicine  Keep a list of the medicines, vitamins, and herbs you take  Include the amounts, and when and why you take them  Bring the list or the pill bottles to follow-up visits  Carry your medicine list with you in case of an emergency  Manage your chronic pain:   Apply heat  on the area in pain for 20 to 30 minutes every 2 hours for as many days as directed  Heat helps decrease pain and muscle spasms  Apply ice  on the part of your body that hurts for 15 to 20 minutes every hour or as directed  Use an ice pack, or put crushed ice in a plastic bag  Cover it with a towel  Ice decreases pain and swelling, and helps prevent tissue damage  Go to physical therapy as directed  A physical therapist teaches you exercises to help improve movement and strength, and to decrease pain  Exercise for 30 minutes, 3 times a week  Regular physical activity can help decrease pain and improve your quality of life  Ask your healthcare provider about the best exercise plan for your type of pain  Get enough sleep  Create a relaxing bedtime routine  Go to sleep and wake up at the same time every day  Avoid caffeine in the afternoon  Talk with a counselor or therapist   A type of counseling called cognitive behavioral therapy (CBT) can help your chronic pain by changing the way you think about it  CBT can also improve your mood, sleep, and ability to move  What you must know if you take narcotic pain medicine: You may need to take a bowel movement softener  The most common side effect of prescription pain medicine is constipation  Bowel movement softeners are available over the counter  Do not mix prescription pain medicines  This can cause an overdose of medicine, which can become life-threatening  Read labels  Make sure you know the ingredients in all of your medicines      Do not drink alcohol when you take prescription pain medicine  It is not safe to mix narcotics or opioids with alcohol or illegal drugs  Prescription pain medicine may impair your ability to drive or work safely  They may also cause dizziness and increase your risk for falling  Store prescription pain medicine in a safe location at home  Keep your medicine away from children and other people  Never share your medicine with anyone  Follow up with your doctor as directed: You may be referred to a pain specialist  Write down your questions so you remember to ask them during your visits  © Copyright [employer] 2022 Information is for End User's use only and may not be sold, redistributed or otherwise used for commercial purposes  The above information is an  only  It is not intended as medical advice for individual conditions or treatments  Talk to your doctor, nurse or pharmacist before following any medical regimen to see if it is safe and effective for you

## 2023-03-07 NOTE — ASSESSMENT & PLAN NOTE
On Percocet 10/325 for pain control evaluated by pain management underwent physical therapy epidural injection minimal improvement for now pain seems to be manageable so much is working full-time and carry out day-to-day activities  All medical records reviewed and reconciled patient [address] reviewed to ensure compliant with the treatment plan for pain management benefits due to the fact that patient has not responded to other measures for pain control so far and severe the pain is significant and interfering with normal daily activities I believe it is reasonable and appropriate to continue the controlled substance in order to improve his ability to function in enhance quality of life the patient has signed a narcotic agreement patient should it was utilize 1 pharmacy and not receiving narcotic from any other provider patient verbalizes understanding that breaching the contract will affect future prescription decision making and critical results in the dismissal from the practice if specifically the has been explained in understood to patient patient demonstrates following informed use of appropriate medicine to analgesia increase activity explained the side effects recommended that thisto the patient patient understands that also advised that appeared to expose him order an abuse and addiction and overdose counseling provided for prevention of any overuse abuse or addiction

## 2023-03-07 NOTE — PROGRESS NOTES
Dr Karyna Denise Office Visit Note  23     Tari Adhikari 62 y o  male MRN: [id number]  : 1965    Assessment:     1  Benign essential hypertension  Assessment & Plan:  Blood pressure controlled continue amlodipine and Maxide and a low-salt diet      2  Neuropathy  Assessment & Plan:  Continue Lyrica symptoms controlled      3  Lumbar foraminal stenosis  Assessment & Plan: On Percocet 10/325 for pain control evaluated by pain management underwent physical therapy epidural injection minimal improvement for now pain seems to be manageable so much is working full-time and carry out day-to-day activities  All medical records reviewed and reconciled patient [address] reviewed to ensure compliant with the treatment plan for pain management benefits due to the fact that patient has not responded to other measures for pain control so far and severe the pain is significant and interfering with normal daily activities I believe it is reasonable and appropriate to continue the controlled substance in order to improve his ability to function in enhance quality of life the patient has signed a narcotic agreement patient should it was utilize 1 pharmacy and not receiving narcotic from any other provider patient verbalizes understanding that breaching the contract will affect future prescription decision making and critical results in the dismissal from the practice if specifically the has been explained in understood to patient patient demonstrates following informed use of appropriate medicine to analgesia increase activity explained the side effects recommended that thisto the patient patient understands that also advised that appeared to expose him order an abuse and addiction and overdose counseling provided for prevention of any overuse abuse or addiction    Orders:  -     oxyCODONE-acetaminophen (PERCOCET)  mg per tablet;  Take 1 tablet by mouth every 8 (eight) hours as needed for severe pain Max Daily Amount: 3 tablets    4  Benign prostatic hyperplasia with urinary frequency  Assessment & Plan:  Continue Flomax symptoms seems to be controlled      5  Chronic intractable pain  Assessment & Plan:  Chronic intractable pain due to lumbar radiculopathy and lumbar spine stenosis managed by Percocet 10/325 4 times a day  All medical records reviewed and reconciled patient PDMP database reviewed to ensure compliant with the treatment plan for pain management benefits due to the fact that patient has not responded to other measures for pain control so far and severe the pain is significant and interfering with normal daily activities I believe it is reasonable and appropriate to continue the controlled substance in order to improve his ability to function in enhance quality of life the patient has signed a narcotic agreement patient should it was utilize 1 pharmacy and not receiving narcotic from any other provider patient verbalizes understanding that breaching the contract will affect future prescription decision making and critical results in the dismissal from the practice if specifically the has been explained in understood to patient patient demonstrates following informed use of appropriate medicine to analgesia increase activity explained the side effects recommended that thisto the patient patient understands that also advised that appeared to expose him order an abuse and addiction and overdose counseling provided for prevention of any overuse abuse or addiction      6  DDD (degenerative disc disease), lumbar  -     oxyCODONE-acetaminophen (PERCOCET)  mg per tablet; Take 1 tablet by mouth every 8 (eight) hours as needed for severe pain Max Daily Amount: 3 tablets    7  Lumbar disc herniation  -     oxyCODONE-acetaminophen (PERCOCET)  mg per tablet; Take 1 tablet by mouth every 8 (eight) hours as needed for severe pain Max Daily Amount: 3 tablets    8   Erectile dysfunction, unspecified erectile dysfunction type  -     sildenafil (Viagra) 100 mg tablet; Take 1 tablet (100 mg total) by mouth daily as needed for erectile dysfunction          Discussion Summary and Plan: Today's care plan and medications were reviewed with patient in detail and all their questions answered to their satisfaction  Chief Complaint   Patient presents with   • Follow-up     Medication refill  Subjective:  Patient came in for follow-up chronic medical condition listed under visit diagnosis and also for chronic intractable pain management for refill for Percocet 10/325 every 6 hours and also complains of erectile dysfunction patient been evaluated by pain management in the past underwent physical therapy epidural injection and was recommended surgery patient reluctant for surgery for now pain control only with Percocet 10/325 every 6 hours somewhat so able to work full-time and function normally and carry day-to-day activities      The following portions of the patient's history were reviewed and updated as appropriate: allergies, current medications, past family history, past medical history, past social history, past surgical history and problem list     Review of Systems   Constitutional: Positive for fatigue  Negative for activity change, appetite change, chills, diaphoresis, fever and unexpected weight change  HENT: Negative for dental problem, drooling, ear discharge, ear pain, facial swelling, hearing loss, mouth sores, nosebleeds, sneezing, tinnitus, trouble swallowing and voice change  Eyes: Negative for photophobia, pain, discharge, redness, itching and visual disturbance  Respiratory: Negative for apnea, choking, chest tightness, shortness of breath, wheezing and stridor  Cardiovascular: Negative for chest pain, palpitations and leg swelling  Gastrointestinal: Negative for abdominal distention, abdominal pain, anal bleeding, blood in stool, constipation, diarrhea, nausea, rectal pain and vomiting     Endocrine: Negative for cold intolerance, heat intolerance, polydipsia, polyphagia and polyuria  Genitourinary: Negative for decreased urine volume, difficulty urinating, dysuria, enuresis, flank pain, frequency, genital sores, hematuria and urgency  Musculoskeletal: Positive for arthralgias, back pain, gait problem, joint swelling and myalgias  Negative for neck pain and neck stiffness  Skin: Negative for color change, pallor, rash and wound  Allergic/Immunologic: Negative  Negative for environmental allergies, food allergies and immunocompromised state  Neurological: Negative for dizziness, tremors, seizures, syncope, facial asymmetry, speech difficulty, weakness, light-headedness, numbness and headaches  Psychiatric/Behavioral: Negative for agitation, behavioral problems, confusion, decreased concentration, dysphoric mood, hallucinations, self-injury, sleep disturbance and suicidal ideas  The patient is not nervous/anxious and is not hyperactive            Historical Information   Patient Active Problem List   Diagnosis   • Benign essential hypertension   • Benign prostatic hyperplasia   • DDD (degenerative disc disease), lumbar   • Incomplete emptying of bladder   • Lumbar disc herniation   • Lumbar foraminal stenosis   • Neuropathy   • Chronic intractable pain   • Mild episode of recurrent major depressive disorder (HCC)   • Hematospermia   • Prostatitis   • Other male erectile dysfunction   • Annual physical exam   • Primary osteoarthritis of both knees   • Chronic cough     Past Medical History:   Diagnosis Date   • Abdominal pain    • Anxiety disorder    • Arthritis    • Back pain    • Dizziness    • Headache    • Hypertension    • Lightheadedness    • Obesity    • Palpitations    • SOB (shortness of breath)      Past Surgical History:   Procedure Laterality Date   • CHOLECYSTECTOMY     • GASTRIC BYPASS       Social History     Substance and Sexual Activity   Alcohol Use None    Comment: none per Arlette Campo Social History     Substance and Sexual Activity   Drug Use Not on file    Comment: none per César Davila     Social History     Tobacco Use   Smoking Status Never   Smokeless Tobacco Never     No family history on file    Health Maintenance Due   Topic   • Hepatitis C Screening    • HIV Screening    • COVID-19 Vaccine (4 - Booster for Moderna series)      Meds/Allergies       Current Outpatient Medications:   •  amlodipine-olmesartan (Soni) 5-40 MG, Take 1 tablet by mouth daily, Disp: 90 tablet, Rfl: 1  •  celecoxib (CeleBREX) 200 mg capsule, Take 1 capsule (200 mg total) by mouth daily, Disp: 90 capsule, Rfl: 0  •  escitalopram (LEXAPRO) 20 mg tablet, Take 1 tablet (20 mg total) by mouth daily, Disp: 90 tablet, Rfl: 0  •  methylPREDNISolone 4 MG tablet therapy pack, Use as directed on package, Disp: 21 each, Rfl: 0  •  oxyCODONE-acetaminophen (PERCOCET)  mg per tablet, Take 1 tablet by mouth every 8 (eight) hours as needed for severe pain Max Daily Amount: 3 tablets, Disp: 90 tablet, Rfl: 0  •  pregabalin (LYRICA) 100 mg capsule, Take 1 capsule (100 mg total) by mouth 2 (two) times a day, Disp: 180 capsule, Rfl: 0  •  sildenafil (Viagra) 100 mg tablet, Take 1 tablet (100 mg total) by mouth daily as needed for erectile dysfunction, Disp: 10 tablet, Rfl: 55  •  tamsulosin (FLOMAX) 0 4 mg, Take 1 capsule (0 4 mg total) by mouth daily with dinner, Disp: 90 capsule, Rfl: 0  •  triamterene-hydrochlorothiazide (MAXZIDE-25) 37 5-25 mg per tablet, Take 1 tablet by mouth daily, Disp: 90 tablet, Rfl: 0  •  fluticasone (FLONASE) 50 mcg/act nasal spray, 1 spray into each nostril 2 (two) times a day, Disp: 15 8 mL, Rfl: 6  •  neomycin-polymyxin-hydrocortisone (CORTISPORIN) 0 35%-10,000 units/mL-1% otic suspension, Administer 3 drops into the left ear 4 (four) times a day for 7 days, Disp: 10 mL, Rfl: 0      Objective:    Vitals:   /80   Pulse 100   Temp 98 °F (36 7 °C)   Resp 16   Ht 5' 11" (1 803 m)   Wt 111 kg (245 lb)   SpO2 98%   BMI 34 17 kg/m²   Body mass index is 34 17 kg/m²  Vitals:    03/07/23 1537   Weight: 111 kg (245 lb)       Physical Exam  Vitals and nursing note reviewed  Constitutional:       General: He is not in acute distress  Appearance: He is well-developed  He is obese  He is not ill-appearing, toxic-appearing or diaphoretic  HENT:      Head: Normocephalic and atraumatic  Right Ear: External ear normal       Left Ear: External ear normal       Nose: Nose normal       Mouth/Throat:      Pharynx: No oropharyngeal exudate  Eyes:      General: Lids are normal  Lids are everted, no foreign bodies appreciated  No scleral icterus  Right eye: No discharge  Left eye: No discharge  Conjunctiva/sclera: Conjunctivae normal       Pupils: Pupils are equal, round, and reactive to light  Neck:      Thyroid: No thyromegaly  Vascular: Normal carotid pulses  No carotid bruit, hepatojugular reflux or JVD  Trachea: No tracheal tenderness or tracheal deviation  Cardiovascular:      Rate and Rhythm: Normal rate and regular rhythm  Pulses: Normal pulses  Heart sounds: Normal heart sounds  No murmur heard  No friction rub  No gallop  Pulmonary:      Effort: Pulmonary effort is normal  No respiratory distress  Breath sounds: Normal breath sounds  No stridor  No wheezing or rales  Chest:      Chest wall: No tenderness  Abdominal:      General: Bowel sounds are normal  There is no distension  Palpations: Abdomen is soft  There is no mass  Tenderness: There is no abdominal tenderness  There is no guarding or rebound  Musculoskeletal:         General: Deformity present  No tenderness  Normal range of motion  Cervical back: Normal range of motion and neck supple  No edema, erythema or rigidity  No spinous process tenderness or muscular tenderness  Normal range of motion     Lymphadenopathy:      Head:      Right side of head: No submental, submandibular, tonsillar, preauricular or posterior auricular adenopathy  Left side of head: No submental, submandibular, tonsillar, preauricular, posterior auricular or occipital adenopathy  Cervical: No cervical adenopathy  Right cervical: No superficial, deep or posterior cervical adenopathy  Left cervical: No superficial, deep or posterior cervical adenopathy  Upper Body:      Right upper body: No pectoral adenopathy  Left upper body: No pectoral adenopathy  Skin:     General: Skin is warm and dry  Coloration: Skin is not pale  Findings: No erythema or rash  Neurological:      Mental Status: He is alert and oriented to person, place, and time  Cranial Nerves: No cranial nerve deficit  Sensory: No sensory deficit  Motor: No tremor, abnormal muscle tone or seizure activity  Coordination: Coordination normal       Gait: Gait abnormal       Deep Tendon Reflexes: Reflexes are normal and symmetric  Reflexes normal    Psychiatric:         Behavior: Behavior normal          Thought Content: Thought content normal          Judgment: Judgment normal          Lab Review   No visits with results within 2 Month(s) from this visit  Latest known visit with results is:   No results found for any previous visit  Patient Instructions   Chronic Pain   WHAT YOU NEED TO KNOW:   Chronic pain is pain that does not get better for 3 months or longer  Chronic pain may hurt all the time, or come and go  DISCHARGE INSTRUCTIONS:   Call your local emergency number, or have someone else call (911 in the [address] [facility]) if:   • You are breathing slower than normal, or you have trouble breathing  • You cannot be awakened  • You have a seizure  Call your doctor if:   • Your heart feels like it is jumping or fluttering  • You cannot think clearly  • You have side effects from prescription pain medicine, such as itching, nausea, or vomiting      • You have trouble sleeping  • Your pain gets worse, even after you take medicine  • You don't think the medicine is working  • You have questions or concerns about your condition or care  Medicines: You may need any of the following:  • Acetaminophen  decreases pain and fever  It is available without a doctor's order  Ask how much to take and how often to take it  Follow directions  Read the labels of all other medicines you are using to see if they also contain acetaminophen, or ask your doctor or pharmacist  Acetaminophen can cause liver damage if not taken correctly  • NSAIDs , such as ibuprofen, help decrease swelling, pain, and fever  This medicine is available with or without a doctor's order  NSAIDs can cause stomach bleeding or kidney problems in certain people  If you take blood thinner medicine, always ask your healthcare provider if NSAIDs are safe for you  Always read the medicine label and follow directions  • Prescription pain medicine  called narcotics or opioids may be given for certain types of chronic pain  Ask your healthcare provider how to take this medicine safely  • Anesthetics  can be rubbed on your skin or injected into a nerve or muscle to numb an area  • Other medicines  may reduce pain, anxiety, muscle tension, or swelling  • Take your medicine as directed  Contact your healthcare provider if you think your medicine is not helping or if you have side effects  Tell your provider if you are allergic to any medicine  Keep a list of the medicines, vitamins, and herbs you take  Include the amounts, and when and why you take them  Bring the list or the pill bottles to follow-up visits  Carry your medicine list with you in case of an emergency  Manage your chronic pain:   • Apply heat  on the area in pain for 20 to 30 minutes every 2 hours for as many days as directed  Heat helps decrease pain and muscle spasms      • Apply ice  on the part of your body that hurts for 15 to 20 minutes every hour or as directed  Use an ice pack, or put crushed ice in a plastic bag  Cover it with a towel  Ice decreases pain and swelling, and helps prevent tissue damage  • Go to physical therapy as directed  A physical therapist teaches you exercises to help improve movement and strength, and to decrease pain  • Exercise for 30 minutes, 3 times a week  Regular physical activity can help decrease pain and improve your quality of life  Ask your healthcare provider about the best exercise plan for your type of pain  • Get enough sleep  Create a relaxing bedtime routine  Go to sleep and wake up at the same time every day  Avoid caffeine in the afternoon  • Talk with a counselor or therapist   A type of counseling called cognitive behavioral therapy (CBT) can help your chronic pain by changing the way you think about it  CBT can also improve your mood, sleep, and ability to move  What you must know if you take narcotic pain medicine:   • You may need to take a bowel movement softener  The most common side effect of prescription pain medicine is constipation  Bowel movement softeners are available over the counter  • Do not mix prescription pain medicines  This can cause an overdose of medicine, which can become life-threatening  Read labels  Make sure you know the ingredients in all of your medicines  • Do not drink alcohol  when you take prescription pain medicine  It is not safe to mix narcotics or opioids with alcohol or illegal drugs  • Prescription pain medicine may impair your ability to drive or work safely  They may also cause dizziness and increase your risk for falling  • Store prescription pain medicine in a safe location at home  Keep your medicine away from children and other people  Never share your medicine with anyone  Follow up with your doctor as directed:   You may be referred to a pain specialist  Write down your questions so you remember to ask them during your visits  © Copyright [facility] 2022 Information is for End User's use only and may not be sold, redistributed or otherwise used for commercial purposes  The above information is an  only  It is not intended as medical advice for individual conditions or treatments  Talk to your doctor, nurse or pharmacist before following any medical regimen to see if it is safe and effective for you  Jose Clark MD        "This note has been constructed using a voice recognition system  Therefore there may be syntax, spelling, and/or grammatical errors   Please call if you have any questions  "

## 2023-04-11 PROBLEM — J30.1 NON-SEASONAL ALLERGIC RHINITIS DUE TO POLLEN: Status: ACTIVE | Noted: 2023-04-11

## 2023-04-27 DIAGNOSIS — N40.0 BENIGN PROSTATIC HYPERPLASIA, UNSPECIFIED WHETHER LOWER URINARY TRACT SYMPTOMS PRESENT: ICD-10-CM

## 2023-04-27 DIAGNOSIS — I10 BENIGN ESSENTIAL HYPERTENSION: ICD-10-CM

## 2023-04-27 NOTE — TELEPHONE ENCOUNTER
Pt's wife said that she has been trying to call your office but cannot get thru    He is out of these medications

## 2023-04-28 RX ORDER — AMLODIPINE AND OLMESARTAN MEDOXOMIL 5; 40 MG/1; MG/1
1 TABLET ORAL DAILY
Qty: 90 TABLET | Refills: 1 | Status: SHIPPED | OUTPATIENT
Start: 2023-04-28

## 2023-04-28 RX ORDER — TAMSULOSIN HYDROCHLORIDE 0.4 MG/1
0.4 CAPSULE ORAL
Qty: 90 CAPSULE | Refills: 0 | Status: SHIPPED | OUTPATIENT
Start: 2023-04-28

## 2023-04-28 RX ORDER — CELECOXIB 200 MG/1
200 CAPSULE ORAL DAILY
Qty: 90 CAPSULE | Refills: 0 | Status: SHIPPED | OUTPATIENT
Start: 2023-04-28

## 2023-04-28 RX ORDER — TRIAMTERENE AND HYDROCHLOROTHIAZIDE 37.5; 25 MG/1; MG/1
1 TABLET ORAL DAILY
Qty: 90 TABLET | Refills: 0 | Status: SHIPPED | OUTPATIENT
Start: 2023-04-28

## 2023-05-18 ENCOUNTER — OFFICE VISIT (OUTPATIENT)
Dept: INTERNAL MEDICINE CLINIC | Facility: CLINIC | Age: 58
End: 2023-05-18

## 2023-05-18 VITALS
HEART RATE: 77 BPM | RESPIRATION RATE: 15 BRPM | HEIGHT: 71 IN | TEMPERATURE: 98 F | DIASTOLIC BLOOD PRESSURE: 82 MMHG | SYSTOLIC BLOOD PRESSURE: 148 MMHG | OXYGEN SATURATION: 97 % | WEIGHT: 238.8 LBS | BODY MASS INDEX: 33.43 KG/M2

## 2023-05-18 DIAGNOSIS — R60.9 SWELLING: ICD-10-CM

## 2023-05-18 DIAGNOSIS — F11.20 CONTINUOUS OPIOID DEPENDENCE (HCC): Primary | ICD-10-CM

## 2023-05-18 DIAGNOSIS — M48.061 LUMBAR FORAMINAL STENOSIS: ICD-10-CM

## 2023-05-18 DIAGNOSIS — M51.26 LUMBAR DISC HERNIATION: ICD-10-CM

## 2023-05-18 DIAGNOSIS — R52 PAIN: ICD-10-CM

## 2023-05-18 DIAGNOSIS — I10 BENIGN ESSENTIAL HYPERTENSION: ICD-10-CM

## 2023-05-18 DIAGNOSIS — M51.36 DDD (DEGENERATIVE DISC DISEASE), LUMBAR: ICD-10-CM

## 2023-05-18 RX ORDER — OXYCODONE AND ACETAMINOPHEN 10; 325 MG/1; MG/1
1 TABLET ORAL EVERY 8 HOURS PRN
Qty: 90 TABLET | Refills: 0 | Status: SHIPPED | OUTPATIENT
Start: 2023-05-18

## 2023-05-18 NOTE — ASSESSMENT & PLAN NOTE
Prescribed Percocet 10/325 3 times a day 1 month supply    All medical records reviewed and reconciled patient PDMP database reviewed to ensure compliant with the treatment plan for pain management benefits due to the fact that patient has not responded to other measures for pain control so far and severe the pain is significant and interfering with normal daily activities I believe it is reasonable and appropriate to continue the controlled substance in order to improve his ability to function in enhance quality of life the patient has signed a narcotic agreement patient should it was utilize 1 pharmacy and not receiving narcotic from any other provider patient verbalizes understanding that breaching the contract will affect future prescription decision making and critical results in the dismissal from the practice if specifically the has been explained in understood to patient patient demonstrates following informed use of appropriate medicine to analgesia increase activity explained the side effects recommended that thisto the patient patient understands that also advised that appeared to expose him order an abuse and addiction and overdose counseling provided for prevention of any overuse abuse or addiction

## 2023-05-18 NOTE — PROGRESS NOTES
Dr Abi Merchant Office Visit Note  23     [place] Homes 62 y o  male MRN: [id number]  : 1965    Assessment:     1  Continuous opioid dependence ([facility] [address] )  Assessment & Plan:  Patient is on Percocet 10/325 3 times a day for chronic intractable pain due to lumbar disc herniation lumbar spine stenosis seen by pain management awaiting to be reevaluated by pain management counseling done screening no evidence of any overuse abuse or addiction  All medical records reviewed and reconciled patient PDMP database reviewed to ensure compliant with the treatment plan for pain management benefits due to the fact that patient has not responded to other measures for pain control so far and severe the pain is significant and interfering with normal daily activities I believe it is reasonable and appropriate to continue the controlled substance in order to improve his ability to function in enhance quality of life the patient has signed a narcotic agreement patient should it was utilize 1 pharmacy and not receiving narcotic from any other provider patient verbalizes understanding that breaching the contract will affect future prescription decision making and critical results in the dismissal from the practice if specifically the has been explained in understood to patient patient demonstrates following informed use of appropriate medicine to analgesia increase activity explained the side effects recommended that thisto the patient patient understands that also advised that appeared to expose him order an abuse and addiction and overdose counseling provided for prevention of any overuse abuse or addiction      2  Pain  Assessment & Plan:  Pain left ankle we will get x-ray continue Celebrex    Orders:  -     XR ankle 3+ vw left; Future; Expected date: 2023    3  Swelling  Assessment & Plan:  Swelling left ankle we will get x-ray continue Celebrex    Orders:  -     XR ankle 3+ vw left; Future; Expected date: 2023    4  Lumbar foraminal stenosis  Assessment & Plan:  Same as under lumbar disc herniation    Orders:  -     oxyCODONE-acetaminophen (PERCOCET)  mg per tablet; Take 1 tablet by mouth every 8 (eight) hours as needed for severe pain Max Daily Amount: 3 tablets    5  DDD (degenerative disc disease), lumbar  Assessment & Plan:  Prescribed Percocet 10/325 3 times a day advised to be seen by pain management    Orders:  -     oxyCODONE-acetaminophen (PERCOCET)  mg per tablet; Take 1 tablet by mouth every 8 (eight) hours as needed for severe pain Max Daily Amount: 3 tablets  -     XR ankle 3+ vw left; Future; Expected date: 05/18/2023    6   Lumbar disc herniation  Assessment & Plan:  Prescribed Percocet 10/325 3 times a day 1 month supply    All medical records reviewed and reconciled patient PDMP database reviewed to ensure compliant with the treatment plan for pain management benefits due to the fact that patient has not responded to other measures for pain control so far and severe the pain is significant and interfering with normal daily activities I believe it is reasonable and appropriate to continue the controlled substance in order to improve his ability to function in enhance quality of life the patient has signed a narcotic agreement patient should it was utilize 1 pharmacy and not receiving narcotic from any other provider patient verbalizes understanding that breaching the contract will affect future prescription decision making and critical results in the dismissal from the practice if specifically the has been explained in understood to patient patient demonstrates following informed use of appropriate medicine to analgesia increase activity explained the side effects recommended that thisto the patient patient understands that also advised that appeared to expose him order an abuse and addiction and overdose counseling provided for prevention of any overuse abuse or addiction    Orders:  - oxyCODONE-acetaminophen (PERCOCET)  mg per tablet; Take 1 tablet by mouth every 8 (eight) hours as needed for severe pain Max Daily Amount: 3 tablets    7  Benign essential hypertension  Assessment & Plan:  Blood pressure control continue low-salt diet Soni and Maxide because of cold feet Coreg was discontinued            Discussion Summary and Plan: Today's care plan and medications were reviewed with patient in detail and all their questions answered to their satisfaction  Chief Complaint   Patient presents with   • Medication Refill     Having allergy problem sneezing, coughing, headaches, itchy eyes  Subjective:  Came in complaining of pain left ankle with some minimal swelling and further refill for Percocet 10/325 3 times a day for chronic intractable pain and also persistent nasal congestion in spite of using nasal spray Claritin and singular advised to be seen by ENT for suspected nasal polyp for deviated nasal septum      The following portions of the patient's history were reviewed and updated as appropriate: allergies, current medications, past family history, past medical history, past social history, past surgical history and problem list     Review of Systems   Constitutional: Positive for fatigue  Negative for activity change, appetite change, chills, diaphoresis, fever and unexpected weight change  HENT: Positive for congestion and sneezing  Negative for dental problem, drooling, ear discharge, ear pain, facial swelling, hearing loss, mouth sores, nosebleeds, tinnitus, trouble swallowing and voice change  Eyes: Negative for photophobia, pain, discharge, redness, itching and visual disturbance  Respiratory: Negative for apnea, choking, chest tightness, shortness of breath, wheezing and stridor  Cardiovascular: Negative for chest pain, palpitations and leg swelling     Gastrointestinal: Negative for abdominal distention, abdominal pain, anal bleeding, blood in stool, constipation, diarrhea, nausea, rectal pain and vomiting  Endocrine: Negative for cold intolerance, heat intolerance, polydipsia, polyphagia and polyuria  Genitourinary: Negative for decreased urine volume, difficulty urinating, dysuria, enuresis, flank pain, frequency, genital sores, hematuria and urgency  Musculoskeletal: Positive for arthralgias, back pain and gait problem  Negative for neck pain and neck stiffness  Skin: Negative for color change, pallor, rash and wound  Allergic/Immunologic: Negative  Negative for environmental allergies, food allergies and immunocompromised state  Neurological: Negative for dizziness, tremors, seizures, syncope, facial asymmetry, speech difficulty, weakness, light-headedness, numbness and headaches  Psychiatric/Behavioral: Negative for agitation, behavioral problems, confusion, decreased concentration, dysphoric mood, hallucinations, self-injury, sleep disturbance and suicidal ideas  The patient is not nervous/anxious and is not hyperactive            Historical Information   Patient Active Problem List   Diagnosis   • Benign essential hypertension   • Benign prostatic hyperplasia   • DDD (degenerative disc disease), lumbar   • Incomplete emptying of bladder   • Lumbar disc herniation   • Lumbar foraminal stenosis   • Neuropathy   • Chronic intractable pain   • Mild episode of recurrent major depressive disorder (HCC)   • Hematospermia   • Prostatitis   • Other male erectile dysfunction   • Annual physical exam   • Primary osteoarthritis of both knees   • Chronic cough   • Non-seasonal allergic rhinitis due to pollen   • Continuous opioid dependence (HCC)   • Pain   • Swelling     Past Medical History:   Diagnosis Date   • Abdominal pain    • Anxiety disorder    • Arthritis    • Back pain    • Dizziness    • Headache    • Hypertension    • Lightheadedness    • Obesity    • Palpitations    • SOB (shortness of breath)      Past Surgical History:   Procedure Laterality Date   • CHOLECYSTECTOMY     • GASTRIC BYPASS       Social History     Substance and Sexual Activity   Alcohol Use None    Comment: none per [place]     Social History     Substance and Sexual Activity   Drug Use Not on file    Comment: none per [place]     Social History     Tobacco Use   Smoking Status Never   Smokeless Tobacco Never     History reviewed  No pertinent family history    Health Maintenance Due   Topic   • Hepatitis C Screening    • HIV Screening       Meds/Allergies       Current Outpatient Medications:   •  amlodipine-olmesartan (Soni) 5-40 MG, Take 1 tablet by mouth daily, Disp: 90 tablet, Rfl: 1  •  celecoxib (CeleBREX) 200 mg capsule, Take 1 capsule (200 mg total) by mouth daily, Disp: 90 capsule, Rfl: 0  •  cetirizine (ZyrTEC) 10 mg tablet, Take 1 tablet (10 mg total) by mouth daily, Disp: 30 tablet, Rfl: 2  •  escitalopram (LEXAPRO) 20 mg tablet, Take 1 tablet (20 mg total) by mouth daily, Disp: 90 tablet, Rfl: 0  •  fluticasone (FLONASE) 50 mcg/act nasal spray, 1 spray into each nostril daily, Disp: 18 2 mL, Rfl: 2  •  oxyCODONE-acetaminophen (PERCOCET)  mg per tablet, Take 1 tablet by mouth every 8 (eight) hours as needed for severe pain Max Daily Amount: 3 tablets, Disp: 90 tablet, Rfl: 0  •  pregabalin (LYRICA) 100 mg capsule, Take 1 capsule (100 mg total) by mouth 2 (two) times a day, Disp: 180 capsule, Rfl: 0  •  sildenafil (Viagra) 100 mg tablet, Take 1 tablet (100 mg total) by mouth daily as needed for erectile dysfunction, Disp: 10 tablet, Rfl: 55  •  tamsulosin (FLOMAX) 0 4 mg, Take 1 capsule (0 4 mg total) by mouth daily with dinner, Disp: 90 capsule, Rfl: 0  •  triamterene-hydrochlorothiazide (MAXZIDE-25) 37 5-25 mg per tablet, Take 1 tablet by mouth daily, Disp: 90 tablet, Rfl: 0  •  methylPREDNISolone 4 MG tablet therapy pack, Use as directed on package (Patient not taking: Reported on 5/18/2023), Disp: 21 each, Rfl: 0  •  neomycin-polymyxin-hydrocortisone "(CORTISPORIN) 0 35%-10,000 units/mL-1% otic suspension, Administer 3 drops into the left ear 4 (four) times a day for 7 days, Disp: 10 mL, Rfl: 0      Objective:    Vitals:   /82   Pulse 77   Temp 98 °F (36 7 °C)   Resp 15   Ht 5' 11\" (1 803 m)   Wt 108 kg (238 lb 12 8 oz)   SpO2 97%   BMI 33 31 kg/m²   Body mass index is 33 31 kg/m²  Vitals:    05/18/23 0942   Weight: 108 kg (238 lb 12 8 oz)       Physical Exam    Lab Review   No visits with results within 2 Month(s) from this visit  Latest known visit with results is:   No results found for any previous visit  Patient Instructions   Chronic painChronic Pain   WHAT YOU NEED TO KNOW:   Chronic pain is pain that does not get better for 3 months or longer  Chronic pain may hurt all the time, or come and go  DISCHARGE INSTRUCTIONS:   Call your local emergency number, or have someone else call (911 in the [address] [facility]) if:   You are breathing slower than normal, or you have trouble breathing  You cannot be awakened  You have a seizure  Call your doctor if:   Your heart feels like it is jumping or fluttering  You cannot think clearly  You have side effects from prescription pain medicine, such as itching, nausea, or vomiting  You have trouble sleeping  Your pain gets worse, even after you take medicine  You don't think the medicine is working  You have questions or concerns about your condition or care  Medicines: You may need any of the following:  Acetaminophen  decreases pain and fever  It is available without a doctor's order  Ask how much to take and how often to take it  Follow directions  Read the labels of all other medicines you are using to see if they also contain acetaminophen, or ask your doctor or pharmacist  Acetaminophen can cause liver damage if not taken correctly  NSAIDs , such as ibuprofen, help decrease swelling, pain, and fever  This medicine is available with or without a doctor's order   NSAIDs can " cause stomach bleeding or kidney problems in certain people  If you take blood thinner medicine, always ask your healthcare provider if NSAIDs are safe for you  Always read the medicine label and follow directions  Prescription pain medicine  called narcotics or opioids may be given for certain types of chronic pain  Ask your healthcare provider how to take this medicine safely  Anesthetics  can be rubbed on your skin or injected into a nerve or muscle to numb an area  Other medicines  may reduce pain, anxiety, muscle tension, or swelling  Take your medicine as directed  Contact your healthcare provider if you think your medicine is not helping or if you have side effects  Tell your provider if you are allergic to any medicine  Keep a list of the medicines, vitamins, and herbs you take  Include the amounts, and when and why you take them  Bring the list or the pill bottles to follow-up visits  Carry your medicine list with you in case of an emergency  Manage your chronic pain:   Apply heat  on the area in pain for 20 to 30 minutes every 2 hours for as many days as directed  Heat helps decrease pain and muscle spasms  Apply ice  on the part of your body that hurts for 15 to 20 minutes every hour or as directed  Use an ice pack, or put crushed ice in a plastic bag  Cover it with a towel  Ice decreases pain and swelling, and helps prevent tissue damage  Go to physical therapy as directed  A physical therapist teaches you exercises to help improve movement and strength, and to decrease pain  Exercise for 30 minutes, 3 times a week  Regular physical activity can help decrease pain and improve your quality of life  Ask your healthcare provider about the best exercise plan for your type of pain  Get enough sleep  Create a relaxing bedtime routine  Go to sleep and wake up at the same time every day  Avoid caffeine in the afternoon      Talk with a counselor or therapist   A type of counseling "called cognitive behavioral therapy (CBT) can help your chronic pain by changing the way you think about it  CBT can also improve your mood, sleep, and ability to move  What you must know if you take narcotic pain medicine: You may need to take a bowel movement softener  The most common side effect of prescription pain medicine is constipation  Bowel movement softeners are available over the counter  Do not mix prescription pain medicines  This can cause an overdose of medicine, which can become life-threatening  Read labels  Make sure you know the ingredients in all of your medicines  Do not drink alcohol  when you take prescription pain medicine  It is not safe to mix narcotics or opioids with alcohol or illegal drugs  Prescription pain medicine may impair your ability to drive or work safely  They may also cause dizziness and increase your risk for falling  Store prescription pain medicine in a safe location at home  Keep your medicine away from children and other people  Never share your medicine with anyone  Follow up with your doctor as directed: You may be referred to a pain specialist  Write down your questions so you remember to ask them during your visits  © Copyright [employer] 2022 Information is for End User's use only and may not be sold, redistributed or otherwise used for commercial purposes  The above information is an  only  It is not intended as medical advice for individual conditions or treatments  Talk to your doctor, nurse or pharmacist before following any medical regimen to see if it is safe and effective for you  Roberto Rolle MD        \"This note has been constructed using a voice recognition system  Therefore there may be syntax, spelling, and/or grammatical errors  Please call if you have any questions   \"  "

## 2023-05-18 NOTE — ASSESSMENT & PLAN NOTE
Patient is on Percocet 10/325 3 times a day for chronic intractable pain due to lumbar disc herniation lumbar spine stenosis seen by pain management awaiting to be reevaluated by pain management counseling done screening no evidence of any overuse abuse or addiction  All medical records reviewed and reconciled patient PDMP database reviewed to ensure compliant with the treatment plan for pain management benefits due to the fact that patient has not responded to other measures for pain control so far and severe the pain is significant and interfering with normal daily activities I believe it is reasonable and appropriate to continue the controlled substance in order to improve his ability to function in enhance quality of life the patient has signed a narcotic agreement patient should it was utilize 1 pharmacy and not receiving narcotic from any other provider patient verbalizes understanding that breaching the contract will affect future prescription decision making and critical results in the dismissal from the practice if specifically the has been explained in understood to patient patient demonstrates following informed use of appropriate medicine to analgesia increase activity explained the side effects recommended that thisto the patient patient understands that also advised that appeared to expose him order an abuse and addiction and overdose counseling provided for prevention of any overuse abuse or addiction

## 2023-05-18 NOTE — ASSESSMENT & PLAN NOTE
Blood pressure control continue low-salt diet Soni and Maxide because of cold feet Coreg was discontinued

## 2023-05-18 NOTE — PATIENT INSTRUCTIONS
Chronic painChronic Pain   WHAT YOU NEED TO KNOW:   Chronic pain is pain that does not get better for 3 months or longer  Chronic pain may hurt all the time, or come and go  DISCHARGE INSTRUCTIONS:   Call your local emergency number, or have someone else call (911 in the [address] [address]) if:   You are breathing slower than normal, or you have trouble breathing  You cannot be awakened  You have a seizure  Call your doctor if:   Your heart feels like it is jumping or fluttering  You cannot think clearly  You have side effects from prescription pain medicine, such as itching, nausea, or vomiting  You have trouble sleeping  Your pain gets worse, even after you take medicine  You don't think the medicine is working  You have questions or concerns about your condition or care  Medicines: You may need any of the following:  Acetaminophen  decreases pain and fever  It is available without a doctor's order  Ask how much to take and how often to take it  Follow directions  Read the labels of all other medicines you are using to see if they also contain acetaminophen, or ask your doctor or pharmacist  Acetaminophen can cause liver damage if not taken correctly  NSAIDs , such as ibuprofen, help decrease swelling, pain, and fever  This medicine is available with or without a doctor's order  NSAIDs can cause stomach bleeding or kidney problems in certain people  If you take blood thinner medicine, always ask your healthcare provider if NSAIDs are safe for you  Always read the medicine label and follow directions  Prescription pain medicine  called narcotics or opioids may be given for certain types of chronic pain  Ask your healthcare provider how to take this medicine safely  Anesthetics  can be rubbed on your skin or injected into a nerve or muscle to numb an area  Other medicines  may reduce pain, anxiety, muscle tension, or swelling  Take your medicine as directed    Contact your healthcare provider if you think your medicine is not helping or if you have side effects  Tell your provider if you are allergic to any medicine  Keep a list of the medicines, vitamins, and herbs you take  Include the amounts, and when and why you take them  Bring the list or the pill bottles to follow-up visits  Carry your medicine list with you in case of an emergency  Manage your chronic pain:   Apply heat  on the area in pain for 20 to 30 minutes every 2 hours for as many days as directed  Heat helps decrease pain and muscle spasms  Apply ice  on the part of your body that hurts for 15 to 20 minutes every hour or as directed  Use an ice pack, or put crushed ice in a plastic bag  Cover it with a towel  Ice decreases pain and swelling, and helps prevent tissue damage  Go to physical therapy as directed  A physical therapist teaches you exercises to help improve movement and strength, and to decrease pain  Exercise for 30 minutes, 3 times a week  Regular physical activity can help decrease pain and improve your quality of life  Ask your healthcare provider about the best exercise plan for your type of pain  Get enough sleep  Create a relaxing bedtime routine  Go to sleep and wake up at the same time every day  Avoid caffeine in the afternoon  Talk with a counselor or therapist   A type of counseling called cognitive behavioral therapy (CBT) can help your chronic pain by changing the way you think about it  CBT can also improve your mood, sleep, and ability to move  What you must know if you take narcotic pain medicine: You may need to take a bowel movement softener  The most common side effect of prescription pain medicine is constipation  Bowel movement softeners are available over the counter  Do not mix prescription pain medicines  This can cause an overdose of medicine, which can become life-threatening  Read labels  Make sure you know the ingredients in all of your medicines      Do not drink alcohol  when you take prescription pain medicine  It is not safe to mix narcotics or opioids with alcohol or illegal drugs  Prescription pain medicine may impair your ability to drive or work safely  They may also cause dizziness and increase your risk for falling  Store prescription pain medicine in a safe location at home  Keep your medicine away from children and other people  Never share your medicine with anyone  Follow up with your doctor as directed: You may be referred to a pain specialist  Write down your questions so you remember to ask them during your visits  © Copyright Corinne Furry 2022 Information is for End User's use only and may not be sold, redistributed or otherwise used for commercial purposes  The above information is an  only  It is not intended as medical advice for individual conditions or treatments  Talk to your doctor, nurse or pharmacist before following any medical regimen to see if it is safe and effective for you

## 2023-06-21 ENCOUNTER — OFFICE VISIT (OUTPATIENT)
Dept: INTERNAL MEDICINE CLINIC | Facility: CLINIC | Age: 58
End: 2023-06-21
Payer: COMMERCIAL

## 2023-06-21 VITALS
OXYGEN SATURATION: 99 % | HEIGHT: 71 IN | RESPIRATION RATE: 16 BRPM | DIASTOLIC BLOOD PRESSURE: 80 MMHG | WEIGHT: 238.8 LBS | SYSTOLIC BLOOD PRESSURE: 126 MMHG | TEMPERATURE: 98 F | BODY MASS INDEX: 33.43 KG/M2 | HEART RATE: 85 BPM

## 2023-06-21 DIAGNOSIS — M51.26 LUMBAR DISC HERNIATION: ICD-10-CM

## 2023-06-21 DIAGNOSIS — M51.36 DDD (DEGENERATIVE DISC DISEASE), LUMBAR: ICD-10-CM

## 2023-06-21 DIAGNOSIS — I70.219 INTERMITTENT CLAUDICATION DUE TO ATHEROSCLEROSIS OF ARTERY OF EXTREMITY (HCC): Primary | ICD-10-CM

## 2023-06-21 DIAGNOSIS — G25.81 RESTLESS LEG: ICD-10-CM

## 2023-06-21 DIAGNOSIS — M48.061 LUMBAR FORAMINAL STENOSIS: ICD-10-CM

## 2023-06-21 DIAGNOSIS — G62.9 NEUROPATHY: ICD-10-CM

## 2023-06-21 DIAGNOSIS — I10 BENIGN ESSENTIAL HYPERTENSION: ICD-10-CM

## 2023-06-21 PROCEDURE — 99213 OFFICE O/P EST LOW 20 MIN: CPT | Performed by: INTERNAL MEDICINE

## 2023-06-21 RX ORDER — OXYCODONE AND ACETAMINOPHEN 10; 325 MG/1; MG/1
1 TABLET ORAL EVERY 8 HOURS PRN
Qty: 90 TABLET | Refills: 0 | Status: SHIPPED | OUTPATIENT
Start: 2023-06-21

## 2023-06-21 RX ORDER — PRAMIPEXOLE DIHYDROCHLORIDE 0.5 MG/1
0.5 TABLET ORAL
Qty: 30 TABLET | Refills: 5 | Status: SHIPPED | OUTPATIENT
Start: 2023-06-21

## 2023-06-21 NOTE — ASSESSMENT & PLAN NOTE
Prescribed Percocet 10/325 3 times a day 1 month supply Percocet was refilled with no change following plan finding counseling as follows from a previous progress note    All medical records reviewed and reconciled patient PDMP database reviewed to ensure compliant with the treatment plan for pain management benefits due to the fact that patient has not responded to other measures for pain control so far and severe the pain is significant and interfering with normal daily activities I believe it is reasonable and appropriate to continue the controlled substance in order to improve his ability to function in enhance quality of life the patient has signed a narcotic agreement patient should it was utilize 1 pharmacy and not receiving narcotic from any other provider patient verbalizes understanding that breaching the contract will affect future prescription decision making and critical results in the dismissal from the practice if specifically the has been explained in understood to patient patient demonstrates following informed use of appropriate medicine to analgesia increase activity explained the side effects recommended that thisto the patient patient understands that also advised that appeared to expose him order an abuse and addiction and overdose counseling provided for prevention of any overuse abuse or addiction

## 2023-06-21 NOTE — PROGRESS NOTES
Dr Chapin Splinter Office Visit Note  23     Josué Herrera 62 y o  male MRN: [id number]  : 1965    Assessment:     1  Intermittent claudication due to atherosclerosis of artery of extremity [facility])  Assessment & Plan:  Patient complains of cough pain for last 1 month more so with walking after 2 black and is relieved with rest also complains of leg cramps at nighttime at rest pain we will get lower extremity arterial duplex until then continue baby aspirin    Orders:  -     VAS lower limb arterial duplex, complete bilateral; Future; Expected date: 2023    2  Benign essential hypertension  Assessment & Plan:  Blood pressure control continue low-salt diet amlodipine olmesartan low-salt diet and Dyazide      3  Lumbar foraminal stenosis  Assessment & Plan:  Same as under lumbar disc herniation    Orders:  -     oxyCODONE-acetaminophen (PERCOCET)  mg per tablet; Take 1 tablet by mouth every 8 (eight) hours as needed for severe pain Max Daily Amount: 3 tablets    4  DDD (degenerative disc disease), lumbar  -     oxyCODONE-acetaminophen (PERCOCET)  mg per tablet; Take 1 tablet by mouth every 8 (eight) hours as needed for severe pain Max Daily Amount: 3 tablets    5   Lumbar disc herniation  Assessment & Plan:  Prescribed Percocet 10/325 3 times a day 1 month supply Percocet was refilled with no change following plan finding counseling as follows from a previous progress note    All medical records reviewed and reconciled patient PDMP database reviewed to ensure compliant with the treatment plan for pain management benefits due to the fact that patient has not responded to other measures for pain control so far and severe the pain is significant and interfering with normal daily activities I believe it is reasonable and appropriate to continue the controlled substance in order to improve his ability to function in enhance quality of life the patient has signed a narcotic agreement patient should it was utilize 1 pharmacy and not receiving narcotic from any other provider patient verbalizes understanding that breaching the contract will affect future prescription decision making and critical results in the dismissal from the practice if specifically the has been explained in understood to patient patient demonstrates following informed use of appropriate medicine to analgesia increase activity explained the side effects recommended that thisto the patient patient understands that also advised that appeared to expose him order an abuse and addiction and overdose counseling provided for prevention of any overuse abuse or addiction    Orders:  -     oxyCODONE-acetaminophen (PERCOCET)  mg per tablet; Take 1 tablet by mouth every 8 (eight) hours as needed for severe pain Max Daily Amount: 3 tablets    6  Restless leg  -     pramipexole (MIRAPEX) 0 5 mg tablet; Take 1 tablet (0 5 mg total) by mouth daily at bedtime    7  Neuropathy  Assessment & Plan:  Continue Lyrica symptoms seems to be controlled            Discussion Summary and Plan: Today's care plan and medications were reviewed with patient in detail and all their questions answered to their satisfaction  Chief Complaint   Patient presents with   • Follow-up     Medication refill  Subjective:  Patient came in follow-up chronic medical condition listed under visit diagnosis mainly complains of calf pain for last 1 month mostly while walking relieved with rest and also complains of cramps at nighttime and at rest pain and came in for refill for Percocet 10-3 25 4 times a day for chronic intraocular pain      The following portions of the patient's history were reviewed and updated as appropriate: allergies, current medications, past family history, past medical history, past social history, past surgical history and problem list     Review of Systems   Constitutional: Positive for fatigue   Negative for activity change, appetite change, chills, diaphoresis, fever and unexpected weight change  HENT: Negative for dental problem, drooling, ear discharge, ear pain, facial swelling, hearing loss, mouth sores, nosebleeds, sneezing, tinnitus, trouble swallowing and voice change  Eyes: Negative for photophobia, pain, discharge, redness, itching and visual disturbance  Respiratory: Negative for apnea, choking, chest tightness, shortness of breath, wheezing and stridor  Cardiovascular: Negative for chest pain, palpitations and leg swelling  Gastrointestinal: Negative for abdominal distention, abdominal pain, anal bleeding, blood in stool, constipation, diarrhea, nausea, rectal pain and vomiting  Endocrine: Negative for cold intolerance, heat intolerance, polydipsia, polyphagia and polyuria  Genitourinary: Negative for decreased urine volume, difficulty urinating, dysuria, enuresis, flank pain, frequency, genital sores, hematuria and urgency  Musculoskeletal: Positive for arthralgias, back pain and gait problem  Negative for neck pain and neck stiffness  Skin: Negative for color change, pallor, rash and wound  Allergic/Immunologic: Negative  Negative for environmental allergies, food allergies and immunocompromised state  Neurological: Negative for dizziness, tremors, seizures, syncope, facial asymmetry, speech difficulty, weakness, light-headedness, numbness and headaches  Psychiatric/Behavioral: Negative for agitation, behavioral problems, confusion, decreased concentration, dysphoric mood, hallucinations, self-injury, sleep disturbance and suicidal ideas  The patient is not nervous/anxious and is not hyperactive            Historical Information   Patient Active Problem List   Diagnosis   • Benign essential hypertension   • Benign prostatic hyperplasia   • DDD (degenerative disc disease), lumbar   • Incomplete emptying of bladder   • Lumbar disc herniation   • Lumbar foraminal stenosis   • Neuropathy   • Chronic intractable pain   • Mild episode of recurrent major depressive disorder ([facility] [address] )   • Hematospermia   • Prostatitis   • Other male erectile dysfunction   • Annual physical exam   • Primary osteoarthritis of both knees   • Chronic cough   • Non-seasonal allergic rhinitis due to pollen   • Continuous opioid dependence (HCC)   • Pain   • Swelling   • Intermittent claudication due to atherosclerosis of artery of extremity (HCC)     Past Medical History:   Diagnosis Date   • Abdominal pain    • Anxiety disorder    • Arthritis    • Back pain    • Dizziness    • Headache    • Hypertension    • Lightheadedness    • Obesity    • Palpitations    • SOB (shortness of breath)      Past Surgical History:   Procedure Laterality Date   • CHOLECYSTECTOMY     • GASTRIC BYPASS       Social History     Substance and Sexual Activity   Alcohol Use None    Comment: none per [place]     Social History     Substance and Sexual Activity   Drug Use Not on file    Comment: none per [place]     Social History     Tobacco Use   Smoking Status Never   Smokeless Tobacco Never     No family history on file    Health Maintenance Due   Topic   • Hepatitis C Screening    • HIV Screening    • COVID-19 Vaccine (6 - Moderna series)      Meds/Allergies       Current Outpatient Medications:   •  amlodipine-olmesartan (Soni) 5-40 MG, Take 1 tablet by mouth daily, Disp: 90 tablet, Rfl: 1  •  celecoxib (CeleBREX) 200 mg capsule, Take 1 capsule (200 mg total) by mouth daily, Disp: 90 capsule, Rfl: 0  •  cetirizine (ZyrTEC) 10 mg tablet, Take 1 tablet (10 mg total) by mouth daily, Disp: 30 tablet, Rfl: 2  •  escitalopram (LEXAPRO) 20 mg tablet, Take 1 tablet (20 mg total) by mouth daily, Disp: 90 tablet, Rfl: 0  •  fluticasone (FLONASE) 50 mcg/act nasal spray, 1 spray into each nostril daily, Disp: 18 2 mL, Rfl: 2  •  oxyCODONE-acetaminophen (PERCOCET)  mg per tablet, Take 1 tablet by mouth every 8 (eight) hours as needed for severe pain Max Daily Amount: 3 tablets, Disp: 90 tablet, "Rfl: 0  •  pramipexole (MIRAPEX) 0 5 mg tablet, Take 1 tablet (0 5 mg total) by mouth daily at bedtime, Disp: 30 tablet, Rfl: 5  •  pregabalin (LYRICA) 100 mg capsule, Take 1 capsule (100 mg total) by mouth 2 (two) times a day, Disp: 180 capsule, Rfl: 0  •  sildenafil (Viagra) 100 mg tablet, Take 1 tablet (100 mg total) by mouth daily as needed for erectile dysfunction, Disp: 10 tablet, Rfl: 55  •  tamsulosin (FLOMAX) 0 4 mg, Take 1 capsule (0 4 mg total) by mouth daily with dinner, Disp: 90 capsule, Rfl: 0  •  triamterene-hydrochlorothiazide (MAXZIDE-25) 37 5-25 mg per tablet, Take 1 tablet by mouth daily, Disp: 90 tablet, Rfl: 0  •  neomycin-polymyxin-hydrocortisone (CORTISPORIN) 0 35%-10,000 units/mL-1% otic suspension, Administer 3 drops into the left ear 4 (four) times a day for 7 days, Disp: 10 mL, Rfl: 0      Objective:    Vitals:   /80   Pulse 85   Temp 98 °F (36 7 °C)   Resp 16   Ht 5' 11\" (1 803 m)   Wt 108 kg (238 lb 12 8 oz)   SpO2 99%   BMI 33 31 kg/m²   Body mass index is 33 31 kg/m²  Vitals:    06/21/23 0945   Weight: 108 kg (238 lb 12 8 oz)       Physical Exam  Constitutional:       General: He is not in acute distress  Appearance: He is well-developed  He is not ill-appearing, toxic-appearing or diaphoretic  HENT:      Head: Normocephalic and atraumatic  Right Ear: External ear normal       Left Ear: External ear normal       Nose: Nose normal       Mouth/Throat:      Pharynx: No oropharyngeal exudate  Eyes:      General: Lids are normal  Lids are everted, no foreign bodies appreciated  No scleral icterus  Right eye: No discharge  Left eye: No discharge  Conjunctiva/sclera: Conjunctivae normal       Pupils: Pupils are equal, round, and reactive to light  Neck:      Thyroid: No thyromegaly  Vascular: Normal carotid pulses  No carotid bruit, hepatojugular reflux or JVD  Trachea: No tracheal tenderness or tracheal deviation     Cardiovascular: " Rate and Rhythm: Normal rate and regular rhythm  Pulses: Normal pulses  Heart sounds: Normal heart sounds  No murmur heard  No friction rub  No gallop  Pulmonary:      Effort: Pulmonary effort is normal  No respiratory distress  Breath sounds: Normal breath sounds  No stridor  No wheezing or rales  Chest:      Chest wall: No tenderness  Abdominal:      General: Bowel sounds are normal  There is no distension  Palpations: Abdomen is soft  There is no mass  Tenderness: There is no abdominal tenderness  There is no guarding or rebound  Musculoskeletal:         General: No tenderness or deformity  Normal range of motion  Cervical back: Normal range of motion and neck supple  No edema, erythema or rigidity  No spinous process tenderness or muscular tenderness  Normal range of motion  Lymphadenopathy:      Head:      Right side of head: No submental, submandibular, tonsillar, preauricular or posterior auricular adenopathy  Left side of head: No submental, submandibular, tonsillar, preauricular, posterior auricular or occipital adenopathy  Cervical: No cervical adenopathy  Right cervical: No superficial, deep or posterior cervical adenopathy  Left cervical: No superficial, deep or posterior cervical adenopathy  Upper Body:      Right upper body: No pectoral adenopathy  Left upper body: No pectoral adenopathy  Skin:     General: Skin is warm and dry  Coloration: Skin is not pale  Findings: No erythema or rash  Neurological:      Mental Status: He is alert and oriented to person, place, and time  Cranial Nerves: No cranial nerve deficit  Sensory: No sensory deficit  Motor: No tremor, abnormal muscle tone or seizure activity  Coordination: Coordination normal       Gait: Gait normal       Deep Tendon Reflexes: Reflexes are normal and symmetric   Reflexes normal    Psychiatric:         Behavior: Behavior normal  Thought Content: Thought content normal          Judgment: Judgment normal          Lab Review   No visits with results within 2 Month(s) from this visit  Latest known visit with results is:   No results found for any previous visit  Patient Instructions   Chronic Pain   WHAT YOU NEED TO KNOW:   Chronic pain is pain that does not get better for 3 months or longer  Chronic pain may hurt all the time, or come and go  DISCHARGE INSTRUCTIONS:   Call your local emergency number, or have someone else call (911 in the [address] [facility]) if:   You are breathing slower than normal, or you have trouble breathing  You cannot be awakened  You have a seizure  Call your doctor if:   Your heart feels like it is jumping or fluttering  You cannot think clearly  You have side effects from prescription pain medicine, such as itching, nausea, or vomiting  You have trouble sleeping  Your pain gets worse, even after you take medicine  You don't think the medicine is working  You have questions or concerns about your condition or care  Medicines: You may need any of the following:  Acetaminophen  decreases pain and fever  It is available without a doctor's order  Ask how much to take and how often to take it  Follow directions  Read the labels of all other medicines you are using to see if they also contain acetaminophen, or ask your doctor or pharmacist  Acetaminophen can cause liver damage if not taken correctly  NSAIDs , such as ibuprofen, help decrease swelling, pain, and fever  This medicine is available with or without a doctor's order  NSAIDs can cause stomach bleeding or kidney problems in certain people  If you take blood thinner medicine, always ask your healthcare provider if NSAIDs are safe for you  Always read the medicine label and follow directions  Prescription pain medicine  called narcotics or opioids may be given for certain types of chronic pain   Ask your healthcare provider how to take this medicine safely  Anesthetics  can be rubbed on your skin or injected into a nerve or muscle to numb an area  Other medicines  may reduce pain, anxiety, muscle tension, or swelling  Take your medicine as directed  Contact your healthcare provider if you think your medicine is not helping or if you have side effects  Tell your provider if you are allergic to any medicine  Keep a list of the medicines, vitamins, and herbs you take  Include the amounts, and when and why you take them  Bring the list or the pill bottles to follow-up visits  Carry your medicine list with you in case of an emergency  Manage your chronic pain:   Apply heat  on the area in pain for 20 to 30 minutes every 2 hours for as many days as directed  Heat helps decrease pain and muscle spasms  Apply ice  on the part of your body that hurts for 15 to 20 minutes every hour or as directed  Use an ice pack, or put crushed ice in a plastic bag  Cover it with a towel  Ice decreases pain and swelling, and helps prevent tissue damage  Go to physical therapy as directed  A physical therapist teaches you exercises to help improve movement and strength, and to decrease pain  Exercise for 30 minutes, 3 times a week  Regular physical activity can help decrease pain and improve your quality of life  Ask your healthcare provider about the best exercise plan for your type of pain  Get enough sleep  Create a relaxing bedtime routine  Go to sleep and wake up at the same time every day  Avoid caffeine in the afternoon  Talk with a counselor or therapist   A type of counseling called cognitive behavioral therapy (CBT) can help your chronic pain by changing the way you think about it  CBT can also improve your mood, sleep, and ability to move  What you must know if you take narcotic pain medicine: You may need to take a bowel movement softener  The most common side effect of prescription pain medicine is constipation   Bowel "movement softeners are available over the counter  Do not mix prescription pain medicines  This can cause an overdose of medicine, which can become life-threatening  Read labels  Make sure you know the ingredients in all of your medicines  Do not drink alcohol  when you take prescription pain medicine  It is not safe to mix narcotics or opioids with alcohol or illegal drugs  Prescription pain medicine may impair your ability to drive or work safely  They may also cause dizziness and increase your risk for falling  Store prescription pain medicine in a safe location at home  Keep your medicine away from children and other people  Never share your medicine with anyone  Follow up with your doctor as directed: You may be referred to a pain specialist  Write down your questions so you remember to ask them during your visits  © Copyright [facility] 2022 Information is for End User's use only and may not be sold, redistributed or otherwise used for commercial purposes  The above information is an  only  It is not intended as medical advice for individual conditions or treatments  Talk to your doctor, nurse or pharmacist before following any medical regimen to see if it is safe and effective for you  Matt Gonzales MD        \"This note has been constructed using a voice recognition system  Therefore there may be syntax, spelling, and/or grammatical errors  Please call if you have any questions   \"  "

## 2023-06-21 NOTE — PATIENT INSTRUCTIONS
Chronic Pain   WHAT YOU NEED TO KNOW:   Chronic pain is pain that does not get better for 3 months or longer  Chronic pain may hurt all the time, or come and go  DISCHARGE INSTRUCTIONS:   Call your local emergency number, or have someone else call (911 in the [address] [facility] [address]) if:   You are breathing slower than normal, or you have trouble breathing  You cannot be awakened  You have a seizure  Call your doctor if:   Your heart feels like it is jumping or fluttering  You cannot think clearly  You have side effects from prescription pain medicine, such as itching, nausea, or vomiting  You have trouble sleeping  Your pain gets worse, even after you take medicine  You don't think the medicine is working  You have questions or concerns about your condition or care  Medicines: You may need any of the following:  Acetaminophen  decreases pain and fever  It is available without a doctor's order  Ask how much to take and how often to take it  Follow directions  Read the labels of all other medicines you are using to see if they also contain acetaminophen, or ask your doctor or pharmacist  Acetaminophen can cause liver damage if not taken correctly  NSAIDs , such as ibuprofen, help decrease swelling, pain, and fever  This medicine is available with or without a doctor's order  NSAIDs can cause stomach bleeding or kidney problems in certain people  If you take blood thinner medicine, always ask your healthcare provider if NSAIDs are safe for you  Always read the medicine label and follow directions  Prescription pain medicine  called narcotics or opioids may be given for certain types of chronic pain  Ask your healthcare provider how to take this medicine safely  Anesthetics  can be rubbed on your skin or injected into a nerve or muscle to numb an area  Other medicines  may reduce pain, anxiety, muscle tension, or swelling  Take your medicine as directed    Contact your healthcare provider if you think your medicine is not helping or if you have side effects  Tell your provider if you are allergic to any medicine  Keep a list of the medicines, vitamins, and herbs you take  Include the amounts, and when and why you take them  Bring the list or the pill bottles to follow-up visits  Carry your medicine list with you in case of an emergency  Manage your chronic pain:   Apply heat  on the area in pain for 20 to 30 minutes every 2 hours for as many days as directed  Heat helps decrease pain and muscle spasms  Apply ice  on the part of your body that hurts for 15 to 20 minutes every hour or as directed  Use an ice pack, or put crushed ice in a plastic bag  Cover it with a towel  Ice decreases pain and swelling, and helps prevent tissue damage  Go to physical therapy as directed  A physical therapist teaches you exercises to help improve movement and strength, and to decrease pain  Exercise for 30 minutes, 3 times a week  Regular physical activity can help decrease pain and improve your quality of life  Ask your healthcare provider about the best exercise plan for your type of pain  Get enough sleep  Create a relaxing bedtime routine  Go to sleep and wake up at the same time every day  Avoid caffeine in the afternoon  Talk with a counselor or therapist   A type of counseling called cognitive behavioral therapy (CBT) can help your chronic pain by changing the way you think about it  CBT can also improve your mood, sleep, and ability to move  What you must know if you take narcotic pain medicine: You may need to take a bowel movement softener  The most common side effect of prescription pain medicine is constipation  Bowel movement softeners are available over the counter  Do not mix prescription pain medicines  This can cause an overdose of medicine, which can become life-threatening  Read labels  Make sure you know the ingredients in all of your medicines      Do not drink alcohol when you take prescription pain medicine  It is not safe to mix narcotics or opioids with alcohol or illegal drugs  Prescription pain medicine may impair your ability to drive or work safely  They may also cause dizziness and increase your risk for falling  Store prescription pain medicine in a safe location at home  Keep your medicine away from children and other people  Never share your medicine with anyone  Follow up with your doctor as directed: You may be referred to a pain specialist  Write down your questions so you remember to ask them during your visits  © [facility] 2022 Information is for End User's use only and may not be sold, redistributed or otherwise used for commercial purposes  The above information is an  only  It is not intended as medical advice for individual conditions or treatments  Talk to your doctor, nurse or pharmacist before following any medical regimen to see if it is safe and effective for you

## 2023-06-21 NOTE — ASSESSMENT & PLAN NOTE
Patient complains of cough pain for last 1 month more so with walking after 2 black and is relieved with rest also complains of leg cramps at nighttime at rest pain we will get lower extremity arterial duplex until then continue baby aspirin

## 2023-07-19 ENCOUNTER — OFFICE VISIT (OUTPATIENT)
Dept: INTERNAL MEDICINE CLINIC | Facility: CLINIC | Age: 58
End: 2023-07-19
Payer: COMMERCIAL

## 2023-07-19 VITALS
OXYGEN SATURATION: 98 % | HEART RATE: 88 BPM | WEIGHT: 230.6 LBS | RESPIRATION RATE: 15 BRPM | TEMPERATURE: 98 F | BODY MASS INDEX: 32.28 KG/M2 | HEIGHT: 71 IN | DIASTOLIC BLOOD PRESSURE: 78 MMHG | SYSTOLIC BLOOD PRESSURE: 130 MMHG

## 2023-07-19 DIAGNOSIS — M51.26 LUMBAR DISC HERNIATION: ICD-10-CM

## 2023-07-19 DIAGNOSIS — G62.9 NEUROPATHY: ICD-10-CM

## 2023-07-19 DIAGNOSIS — N40.1 BENIGN PROSTATIC HYPERPLASIA WITH URINARY FREQUENCY: ICD-10-CM

## 2023-07-19 DIAGNOSIS — I70.219 INTERMITTENT CLAUDICATION DUE TO ATHEROSCLEROSIS OF ARTERY OF EXTREMITY (HCC): ICD-10-CM

## 2023-07-19 DIAGNOSIS — I10 BENIGN ESSENTIAL HYPERTENSION: ICD-10-CM

## 2023-07-19 DIAGNOSIS — F33.0 MILD EPISODE OF RECURRENT MAJOR DEPRESSIVE DISORDER (HCC): ICD-10-CM

## 2023-07-19 DIAGNOSIS — E66.01 MORBID OBESITY DUE TO EXCESS CALORIES (HCC): Primary | ICD-10-CM

## 2023-07-19 DIAGNOSIS — N41.1 CHRONIC PROSTATITIS: ICD-10-CM

## 2023-07-19 DIAGNOSIS — M51.36 DDD (DEGENERATIVE DISC DISEASE), LUMBAR: ICD-10-CM

## 2023-07-19 DIAGNOSIS — M48.061 LUMBAR FORAMINAL STENOSIS: ICD-10-CM

## 2023-07-19 DIAGNOSIS — R35.0 BENIGN PROSTATIC HYPERPLASIA WITH URINARY FREQUENCY: ICD-10-CM

## 2023-07-19 DIAGNOSIS — Z98.84 GASTRIC BYPASS STATUS FOR OBESITY: ICD-10-CM

## 2023-07-19 PROCEDURE — 99214 OFFICE O/P EST MOD 30 MIN: CPT | Performed by: INTERNAL MEDICINE

## 2023-07-19 RX ORDER — OXYCODONE AND ACETAMINOPHEN 10; 325 MG/1; MG/1
1 TABLET ORAL EVERY 8 HOURS PRN
Qty: 90 TABLET | Refills: 0 | Status: SHIPPED | OUTPATIENT
Start: 2023-07-19

## 2023-07-19 NOTE — PROGRESS NOTES
BMI Counseling: Body mass index is 32.16 kg/m². The BMI is above normal. Nutrition recommendations include decreasing portion sizes, encouraging healthy choices of fruits and vegetables, decreasing fast food intake, consuming healthier snacks, limiting drinks that contain sugar, moderation in carbohydrate intake, increasing intake of lean protein, reducing intake of saturated and trans fat and reducing intake of cholesterol. Exercise recommendations include moderate physical activity 150 minutes/week and exercising 3-5 times per week. No pharmacotherapy was ordered. Rationale for BMI follow-up plan is due to patient being overweight or obese. Dr. Willow Moore Office Visit Note  23     Rose Po 62 y.o. male MRN: [id number]  : 1965    Assessment:     1. Morbid obesity due to excess calories ([address])  Assessment & Plan:  BMI 32.16 counseling done to lose weight patient tried diet exercise change in lifestyle no success not able to lose weight history of gastric bypass almost 27 years ago discussed Wegovy injection at length patient is has a comorbid condition including hyperlipidemia hypertension      2. Lumbar foraminal stenosis  Assessment & Plan:  Same as under lumbar disc herniation Percocet refilled    Orders:  -     oxyCODONE-acetaminophen (PERCOCET)  mg per tablet; Take 1 tablet by mouth every 8 (eight) hours as needed for severe pain Max Daily Amount: 3 tablets    3. DDD (degenerative disc disease), lumbar  -     oxyCODONE-acetaminophen (PERCOCET)  mg per tablet; Take 1 tablet by mouth every 8 (eight) hours as needed for severe pain Max Daily Amount: 3 tablets    4.  Lumbar disc herniation  Assessment & Plan:  Prescribed Percocet 10/325 3 times a day 1 month supply Percocet was refilled with no change following plan finding counseling as follows from a previous progress note unchanged overall symptoms controlled with this regimen able to work full-time and function and carry out day-to-day activities remaining follow-up plan finding as follows from my previous progress note    All medical records reviewed and reconciled patient PDMP database reviewed to ensure compliant with the treatment plan for pain management benefits due to the fact that patient has not responded to other measures for pain control so far and severe the pain is significant and interfering with normal daily activities I believe it is reasonable and appropriate to continue the controlled substance in order to improve his ability to function in enhance quality of life the patient has signed a narcotic agreement patient should it was utilize 1 pharmacy and not receiving narcotic from any other provider patient verbalizes understanding that breaching the contract will affect future prescription decision making and critical results in the dismissal from the practice if specifically the has been explained in understood to patient patient demonstrates following informed use of appropriate medicine to analgesia increase activity explained the side effects recommended that thisto the patient patient understands that also advised that appeared to expose him order an abuse and addiction and overdose counseling provided for prevention of any overuse abuse or addiction    Orders:  -     oxyCODONE-acetaminophen (PERCOCET)  mg per tablet; Take 1 tablet by mouth every 8 (eight) hours as needed for severe pain Max Daily Amount: 3 tablets    5. Benign essential hypertension  Assessment & Plan:  Blood pressure control continue low-salt diet amlodipine and Maxide and blood pressure monitor      6. Chronic prostatitis    7. Mild episode of recurrent major depressive disorder ([address])  Assessment & Plan:  Symptoms controlled in remission continue Lexapro      8.  Intermittent claudication due to atherosclerosis of artery of extremity (HCC)  Assessment & Plan:  No signs symptoms of ischemia awaiting lower extremity arterial Doppler continue baby aspirin      9. Neuropathy  Assessment & Plan:  Much improved symptoms controlled continue Lyrica and Mirapex      10. Benign prostatic hyperplasia with urinary frequency  Assessment & Plan:  Symptoms improved continue Flomax evaluated by urology      11. Gastric bypass status for obesity          Discussion Summary and Plan: Today's care plan and medications were reviewed with patient in detail and all their questions answered to their satisfaction. Chief Complaint   Patient presents with   • Follow-up     Medication refill. Obesity treatment  Subjective:  Patient came in follow-up chronic medical condition listed under visit diagnosis on Percocet refill 10/325 3 times a day this regimen helping patient to work full-time and function and carry out day-to-day activities also patient is trying to lose weight for more than 6 months now with diet and cutting down the calorie portion changing the lifestyle not successful history of gastric bypass 27 years ago discussed other options including Wegovy injection at length with side effects and if is agreeable to the patient will start next week      The following portions of the patient's history were reviewed and updated as appropriate: allergies, current medications, past family history, past medical history, past social history, past surgical history and problem list.    Review of Systems   Constitutional: Positive for fatigue. Negative for activity change, appetite change, chills, diaphoresis, fever and unexpected weight change. HENT: Negative for dental problem, drooling, ear discharge, ear pain, facial swelling, hearing loss, mouth sores, nosebleeds, sneezing, tinnitus, trouble swallowing and voice change. Eyes: Negative for photophobia, pain, discharge, redness, itching and visual disturbance. Respiratory: Negative for apnea, choking, chest tightness, shortness of breath, wheezing and stridor.     Cardiovascular: Negative for chest pain, palpitations and leg swelling. Gastrointestinal: Negative for abdominal distention, abdominal pain, anal bleeding, blood in stool, constipation, diarrhea, nausea, rectal pain and vomiting. Endocrine: Negative for cold intolerance, heat intolerance, polydipsia, polyphagia and polyuria. Genitourinary: Negative for decreased urine volume, difficulty urinating, dysuria, enuresis, flank pain, frequency, genital sores, hematuria and urgency. Musculoskeletal: Positive for arthralgias, back pain and gait problem. Negative for neck pain and neck stiffness. Skin: Negative for color change, pallor, rash and wound. Allergic/Immunologic: Negative. Negative for environmental allergies, food allergies and immunocompromised state. Neurological: Negative for dizziness, tremors, seizures, syncope, facial asymmetry, speech difficulty, weakness, light-headedness, numbness and headaches. Psychiatric/Behavioral: Negative for agitation, behavioral problems, confusion, decreased concentration, dysphoric mood, hallucinations, self-injury, sleep disturbance and suicidal ideas. The patient is not nervous/anxious and is not hyperactive.           Historical Information   Patient Active Problem List   Diagnosis   • Benign essential hypertension   • Benign prostatic hyperplasia   • DDD (degenerative disc disease), lumbar   • Incomplete emptying of bladder   • Lumbar disc herniation   • Lumbar foraminal stenosis   • Neuropathy   • Chronic intractable pain   • Mild episode of recurrent major depressive disorder (HCC)   • Hematospermia   • Prostatitis   • Other male erectile dysfunction   • Annual physical exam   • Primary osteoarthritis of both knees   • Chronic cough   • Non-seasonal allergic rhinitis due to pollen   • Continuous opioid dependence (HCC)   • Pain   • Swelling   • Intermittent claudication due to atherosclerosis of artery of extremity (HCC)   • Lumbar spondylosis   • Gastric bypass status for obesity   • Morbid obesity due to excess calories [facility])     Past Medical History:   Diagnosis Date   • Abdominal pain    • Anxiety disorder    • Arthritis    • Back pain    • Dizziness    • Headache    • Hypertension    • Lightheadedness    • Obesity    • Palpitations    • SOB (shortness of breath)      Past Surgical History:   Procedure Laterality Date   • CHOLECYSTECTOMY     • GASTRIC BYPASS       Social History     Substance and Sexual Activity   Alcohol Use None    Comment: none per Christie     Social History     Substance and Sexual Activity   Drug Use Not on file    Comment: none per Christie     Social History     Tobacco Use   Smoking Status Never   Smokeless Tobacco Never     History reviewed. No pertinent family history.   Health Maintenance Due   Topic   • Hepatitis C Screening    • HIV Screening    • COVID-19 Vaccine (6 - Moderna series)   • Influenza Vaccine (1)      Meds/Allergies       Current Outpatient Medications:   •  amlodipine-olmesartan (Soni) 5-40 MG, Take 1 tablet by mouth daily, Disp: 90 tablet, Rfl: 1  •  celecoxib (CeleBREX) 200 mg capsule, Take 1 capsule (200 mg total) by mouth daily, Disp: 90 capsule, Rfl: 0  •  cetirizine (ZyrTEC) 10 mg tablet, Take 1 tablet (10 mg total) by mouth daily, Disp: 30 tablet, Rfl: 2  •  escitalopram (LEXAPRO) 20 mg tablet, Take 1 tablet (20 mg total) by mouth daily, Disp: 90 tablet, Rfl: 0  •  fluticasone (FLONASE) 50 mcg/act nasal spray, 1 spray into each nostril daily, Disp: 18.2 mL, Rfl: 2  •  oxyCODONE-acetaminophen (PERCOCET)  mg per tablet, Take 1 tablet by mouth every 8 (eight) hours as needed for severe pain Max Daily Amount: 3 tablets, Disp: 90 tablet, Rfl: 0  •  pramipexole (MIRAPEX) 0.5 mg tablet, Take 1 tablet (0.5 mg total) by mouth daily at bedtime, Disp: 30 tablet, Rfl: 5  •  pregabalin (LYRICA) 100 mg capsule, Take 1 capsule (100 mg total) by mouth 2 (two) times a day, Disp: 180 capsule, Rfl: 0  •  sildenafil (Viagra) 100 mg tablet, Take 1 tablet (100 mg total) by mouth daily as needed for erectile dysfunction, Disp: 10 tablet, Rfl: 55  •  tamsulosin (FLOMAX) 0.4 mg, Take 1 capsule (0.4 mg total) by mouth daily with dinner, Disp: 90 capsule, Rfl: 0  •  triamterene-hydrochlorothiazide (MAXZIDE-25) 37.5-25 mg per tablet, Take 1 tablet by mouth daily, Disp: 90 tablet, Rfl: 0  •  neomycin-polymyxin-hydrocortisone (CORTISPORIN) 0.35%-10,000 units/mL-1% otic suspension, Administer 3 drops into the left ear 4 (four) times a day for 7 days, Disp: 10 mL, Rfl: 0      Objective:    Vitals:   /78   Pulse 88   Temp 98 °F (36.7 °C)   Resp 15   Ht 5' 11" (1.803 m)   Wt 105 kg (230 lb 9.6 oz)   SpO2 98%   BMI 32.16 kg/m²   Body mass index is 32.16 kg/m². Vitals:    07/19/23 0920   Weight: 105 kg (230 lb 9.6 oz)       Physical Exam  Vitals and nursing note reviewed. Constitutional:       General: He is not in acute distress. Appearance: He is well-developed. He is obese. He is not ill-appearing, toxic-appearing or diaphoretic. HENT:      Head: Normocephalic and atraumatic. Right Ear: External ear normal.      Left Ear: External ear normal.      Nose: Nose normal.      Mouth/Throat:      Pharynx: No oropharyngeal exudate. Eyes:      General: Lids are normal. Lids are everted, no foreign bodies appreciated. No scleral icterus. Right eye: No discharge. Left eye: No discharge. Conjunctiva/sclera: Conjunctivae normal.      Pupils: Pupils are equal, round, and reactive to light. Neck:      Thyroid: No thyromegaly. Vascular: Normal carotid pulses. No carotid bruit, hepatojugular reflux or JVD. Trachea: No tracheal tenderness or tracheal deviation. Cardiovascular:      Rate and Rhythm: Normal rate and regular rhythm. Pulses: Normal pulses. Heart sounds: Normal heart sounds. No murmur heard. No friction rub. No gallop. Pulmonary:      Effort: Pulmonary effort is normal. No respiratory distress.       Breath sounds: Normal breath sounds. No stridor. No wheezing or rales. Chest:      Chest wall: No tenderness. Abdominal:      General: Bowel sounds are normal. There is no distension. Palpations: Abdomen is soft. There is no mass. Tenderness: There is no abdominal tenderness. There is no guarding or rebound. Musculoskeletal:         General: No tenderness or deformity. Normal range of motion. Cervical back: Normal range of motion and neck supple. No edema, erythema or rigidity. No spinous process tenderness or muscular tenderness. Normal range of motion. Lymphadenopathy:      Head:      Right side of head: No submental, submandibular, tonsillar, preauricular or posterior auricular adenopathy. Left side of head: No submental, submandibular, tonsillar, preauricular, posterior auricular or occipital adenopathy. Cervical: No cervical adenopathy. Right cervical: No superficial, deep or posterior cervical adenopathy. Left cervical: No superficial, deep or posterior cervical adenopathy. Upper Body:      Right upper body: No pectoral adenopathy. Left upper body: No pectoral adenopathy. Skin:     General: Skin is warm and dry. Coloration: Skin is not pale. Findings: No erythema or rash. Neurological:      Mental Status: He is alert and oriented to person, place, and time. Cranial Nerves: No cranial nerve deficit. Sensory: No sensory deficit. Motor: No tremor, abnormal muscle tone or seizure activity. Coordination: Coordination normal.      Gait: Gait abnormal.      Deep Tendon Reflexes: Reflexes are normal and symmetric. Reflexes normal.   Psychiatric:         Behavior: Behavior normal.         Thought Content: Thought content normal.         Judgment: Judgment normal.         Lab Review   No visits with results within 2 Month(s) from this visit. Latest known visit with results is:   No results found for any previous visit.          Patient Instructions Chronic Pain   WHAT YOU NEED TO KNOW:   Chronic pain is pain that does not get better for 3 months or longer. Chronic pain may hurt all the time, or come and go. DISCHARGE INSTRUCTIONS:   Call your local emergency number, or have someone else call (911 in the [address]) if:   You are breathing slower than normal, or you have trouble breathing. You cannot be awakened. You have a seizure. Call your doctor if:   Your heart feels like it is jumping or fluttering. You cannot think clearly. You have side effects from prescription pain medicine, such as itching, nausea, or vomiting. You have trouble sleeping. Your pain gets worse, even after you take medicine. You don't think the medicine is working. You have questions or concerns about your condition or care. Medicines: You may need any of the following:  Acetaminophen  decreases pain and fever. It is available without a doctor's order. Ask how much to take and how often to take it. Follow directions. Read the labels of all other medicines you are using to see if they also contain acetaminophen, or ask your doctor or pharmacist. Acetaminophen can cause liver damage if not taken correctly. NSAIDs , such as ibuprofen, help decrease swelling, pain, and fever. This medicine is available with or without a doctor's order. NSAIDs can cause stomach bleeding or kidney problems in certain people. If you take blood thinner medicine, always ask your healthcare provider if NSAIDs are safe for you. Always read the medicine label and follow directions. Prescription pain medicine  called narcotics or opioids may be given for certain types of chronic pain. Ask your healthcare provider how to take this medicine safely. Anesthetics  can be rubbed on your skin or injected into a nerve or muscle to numb an area. Other medicines  may reduce pain, anxiety, muscle tension, or swelling. Take your medicine as directed.   Contact your healthcare provider if you think your medicine is not helping or if you have side effects. Tell your provider if you are allergic to any medicine. Keep a list of the medicines, vitamins, and herbs you take. Include the amounts, and when and why you take them. Bring the list or the pill bottles to follow-up visits. Carry your medicine list with you in case of an emergency. Manage your chronic pain:   Apply heat  on the area in pain for 20 to 30 minutes every 2 hours for as many days as directed. Heat helps decrease pain and muscle spasms. Apply ice  on the part of your body that hurts for 15 to 20 minutes every hour or as directed. Use an ice pack, or put crushed ice in a plastic bag. Cover it with a towel. Ice decreases pain and swelling, and helps prevent tissue damage. Go to physical therapy as directed. A physical therapist teaches you exercises to help improve movement and strength, and to decrease pain. Exercise for 30 minutes, 3 times a week. Regular physical activity can help decrease pain and improve your quality of life. Ask your healthcare provider about the best exercise plan for your type of pain. Get enough sleep. Create a relaxing bedtime routine. Go to sleep and wake up at the same time every day. Avoid caffeine in the afternoon. Talk with a counselor or therapist.  A type of counseling called cognitive behavioral therapy (CBT) can help your chronic pain by changing the way you think about it. CBT can also improve your mood, sleep, and ability to move. What you must know if you take narcotic pain medicine: You may need to take a bowel movement softener. The most common side effect of prescription pain medicine is constipation. Bowel movement softeners are available over the counter. Do not mix prescription pain medicines. This can cause an overdose of medicine, which can become life-threatening. Read labels. Make sure you know the ingredients in all of your medicines.     Do not drink alcohol when you take prescription pain medicine. It is not safe to mix narcotics or opioids with alcohol or illegal drugs. Prescription pain medicine may impair your ability to drive or work safely. They may also cause dizziness and increase your risk for falling. Store prescription pain medicine in a safe location at home. Keep your medicine away from children and other people. Never share your medicine with anyone. Follow up with your doctor as directed: You may be referred to a pain specialist. Write down your questions so you remember to ask them during your visits. © Copyright Elena Joy 2022 Information is for End User's use only and may not be sold, redistributed or otherwise used for commercial purposes. The above information is an  only. It is not intended as medical advice for individual conditions or treatments. Talk to your doctor, nurse or pharmacist before following any medical regimen to see if it is safe and effective for you. Michael Batista MD        "This note has been constructed using a voice recognition system. Therefore there may be syntax, spelling, and/or grammatical errors.  Please call if you have any questions. "

## 2023-07-19 NOTE — PATIENT INSTRUCTIONS
Chronic Pain   WHAT YOU NEED TO KNOW:   Chronic pain is pain that does not get better for 3 months or longer. Chronic pain may hurt all the time, or come and go. DISCHARGE INSTRUCTIONS:   Call your local emergency number, or have someone else call (911 in the [address]) if:   You are breathing slower than normal, or you have trouble breathing. You cannot be awakened. You have a seizure. Call your doctor if:   Your heart feels like it is jumping or fluttering. You cannot think clearly. You have side effects from prescription pain medicine, such as itching, nausea, or vomiting. You have trouble sleeping. Your pain gets worse, even after you take medicine. You don't think the medicine is working. You have questions or concerns about your condition or care. Medicines: You may need any of the following:  Acetaminophen  decreases pain and fever. It is available without a doctor's order. Ask how much to take and how often to take it. Follow directions. Read the labels of all other medicines you are using to see if they also contain acetaminophen, or ask your doctor or pharmacist. Acetaminophen can cause liver damage if not taken correctly. NSAIDs , such as ibuprofen, help decrease swelling, pain, and fever. This medicine is available with or without a doctor's order. NSAIDs can cause stomach bleeding or kidney problems in certain people. If you take blood thinner medicine, always ask your healthcare provider if NSAIDs are safe for you. Always read the medicine label and follow directions. Prescription pain medicine  called narcotics or opioids may be given for certain types of chronic pain. Ask your healthcare provider how to take this medicine safely. Anesthetics  can be rubbed on your skin or injected into a nerve or muscle to numb an area. Other medicines  may reduce pain, anxiety, muscle tension, or swelling. Take your medicine as directed.   Contact your healthcare provider if you think your medicine is not helping or if you have side effects. Tell your provider if you are allergic to any medicine. Keep a list of the medicines, vitamins, and herbs you take. Include the amounts, and when and why you take them. Bring the list or the pill bottles to follow-up visits. Carry your medicine list with you in case of an emergency. Manage your chronic pain:   Apply heat  on the area in pain for 20 to 30 minutes every 2 hours for as many days as directed. Heat helps decrease pain and muscle spasms. Apply ice  on the part of your body that hurts for 15 to 20 minutes every hour or as directed. Use an ice pack, or put crushed ice in a plastic bag. Cover it with a towel. Ice decreases pain and swelling, and helps prevent tissue damage. Go to physical therapy as directed. A physical therapist teaches you exercises to help improve movement and strength, and to decrease pain. Exercise for 30 minutes, 3 times a week. Regular physical activity can help decrease pain and improve your quality of life. Ask your healthcare provider about the best exercise plan for your type of pain. Get enough sleep. Create a relaxing bedtime routine. Go to sleep and wake up at the same time every day. Avoid caffeine in the afternoon. Talk with a counselor or therapist.  A type of counseling called cognitive behavioral therapy (CBT) can help your chronic pain by changing the way you think about it. CBT can also improve your mood, sleep, and ability to move. What you must know if you take narcotic pain medicine: You may need to take a bowel movement softener. The most common side effect of prescription pain medicine is constipation. Bowel movement softeners are available over the counter. Do not mix prescription pain medicines. This can cause an overdose of medicine, which can become life-threatening. Read labels. Make sure you know the ingredients in all of your medicines.     Do not drink alcohol when you take prescription pain medicine. It is not safe to mix narcotics or opioids with alcohol or illegal drugs. Prescription pain medicine may impair your ability to drive or work safely. They may also cause dizziness and increase your risk for falling. Store prescription pain medicine in a safe location at home. Keep your medicine away from children and other people. Never share your medicine with anyone. Follow up with your doctor as directed: You may be referred to a pain specialist. Write down your questions so you remember to ask them during your visits. © Copyright Ray Carvalho 2022 Information is for End User's use only and may not be sold, redistributed or otherwise used for commercial purposes. The above information is an  only. It is not intended as medical advice for individual conditions or treatments. Talk to your doctor, nurse or pharmacist before following any medical regimen to see if it is safe and effective for you.

## 2023-07-19 NOTE — ASSESSMENT & PLAN NOTE
BMI 32.16 counseling done to lose weight patient tried diet exercise change in lifestyle no success not able to lose weight history of gastric bypass almost 27 years ago discussed Wegovy injection at length patient is has a comorbid condition including hyperlipidemia hypertension

## 2023-07-19 NOTE — ASSESSMENT & PLAN NOTE
Prescribed Percocet 10/325 3 times a day 1 month supply Percocet was refilled with no change following plan finding counseling as follows from a previous progress note unchanged overall symptoms controlled with this regimen able to work full-time and function and carry out day-to-day activities remaining follow-up plan finding as follows from my previous progress note    All medical records reviewed and reconciled patient [address] reviewed to ensure compliant with the treatment plan for pain management benefits due to the fact that patient has not responded to other measures for pain control so far and severe the pain is significant and interfering with normal daily activities I believe it is reasonable and appropriate to continue the controlled substance in order to improve his ability to function in enhance quality of life the patient has signed a narcotic agreement patient should it was utilize 1 pharmacy and not receiving narcotic from any other provider patient verbalizes understanding that breaching the contract will affect future prescription decision making and critical results in the dismissal from the practice if specifically the has been explained in understood to patient patient demonstrates following informed use of appropriate medicine to analgesia increase activity explained the side effects recommended that thisto the patient patient understands that also advised that appeared to expose him order an abuse and addiction and overdose counseling provided for prevention of any overuse abuse or addiction

## 2023-08-01 DIAGNOSIS — I10 BENIGN ESSENTIAL HYPERTENSION: ICD-10-CM

## 2023-08-01 DIAGNOSIS — N40.0 BENIGN PROSTATIC HYPERPLASIA, UNSPECIFIED WHETHER LOWER URINARY TRACT SYMPTOMS PRESENT: ICD-10-CM

## 2023-08-01 RX ORDER — TAMSULOSIN HYDROCHLORIDE 0.4 MG/1
0.4 CAPSULE ORAL
Qty: 90 CAPSULE | Refills: 2 | Status: SHIPPED | OUTPATIENT
Start: 2023-08-01

## 2023-08-01 RX ORDER — TRIAMTERENE AND HYDROCHLOROTHIAZIDE 37.5; 25 MG/1; MG/1
1 TABLET ORAL DAILY
Qty: 90 TABLET | Refills: 2 | Status: SHIPPED | OUTPATIENT
Start: 2023-08-01

## 2023-08-01 RX ORDER — AMLODIPINE AND OLMESARTAN MEDOXOMIL 5; 40 MG/1; MG/1
1 TABLET ORAL DAILY
Qty: 90 TABLET | Refills: 2 | Status: SHIPPED | OUTPATIENT
Start: 2023-08-01

## 2023-08-01 RX ORDER — CELECOXIB 200 MG/1
200 CAPSULE ORAL DAILY
Qty: 90 CAPSULE | Refills: 2 | Status: SHIPPED | OUTPATIENT
Start: 2023-08-01

## 2023-08-09 ENCOUNTER — RA CDI HCC (OUTPATIENT)
Dept: OTHER | Facility: HOSPITAL | Age: 58
End: 2023-08-09

## 2023-08-09 NOTE — PROGRESS NOTES
[address] [facility] coding opportunities       Chart reviewed, no opportunity found: CHART REVIEWED, NO OPPORTUNITY FOUND        Patients Insurance        Commercial Insurance: [employer]

## 2023-08-16 ENCOUNTER — OFFICE VISIT (OUTPATIENT)
Dept: INTERNAL MEDICINE CLINIC | Facility: CLINIC | Age: 58
End: 2023-08-16
Payer: COMMERCIAL

## 2023-08-16 VITALS
TEMPERATURE: 98 F | SYSTOLIC BLOOD PRESSURE: 146 MMHG | OXYGEN SATURATION: 100 % | RESPIRATION RATE: 15 BRPM | WEIGHT: 229 LBS | DIASTOLIC BLOOD PRESSURE: 80 MMHG | HEART RATE: 60 BPM | BODY MASS INDEX: 32.06 KG/M2 | HEIGHT: 71 IN

## 2023-08-16 DIAGNOSIS — Z98.84 GASTRIC BYPASS STATUS FOR OBESITY: ICD-10-CM

## 2023-08-16 DIAGNOSIS — I10 BENIGN ESSENTIAL HYPERTENSION: ICD-10-CM

## 2023-08-16 DIAGNOSIS — M51.26 LUMBAR DISC HERNIATION: ICD-10-CM

## 2023-08-16 DIAGNOSIS — E66.01 MORBID OBESITY DUE TO EXCESS CALORIES (HCC): Primary | ICD-10-CM

## 2023-08-16 DIAGNOSIS — M51.36 DDD (DEGENERATIVE DISC DISEASE), LUMBAR: ICD-10-CM

## 2023-08-16 DIAGNOSIS — M48.061 LUMBAR FORAMINAL STENOSIS: ICD-10-CM

## 2023-08-16 PROCEDURE — 99213 OFFICE O/P EST LOW 20 MIN: CPT | Performed by: INTERNAL MEDICINE

## 2023-08-16 RX ORDER — OXYCODONE AND ACETAMINOPHEN 10; 325 MG/1; MG/1
1 TABLET ORAL EVERY 8 HOURS PRN
Qty: 90 TABLET | Refills: 0 | Status: SHIPPED | OUTPATIENT
Start: 2023-08-16

## 2023-08-16 NOTE — ASSESSMENT & PLAN NOTE
Counseling done to lose weight diet exercise tried more than 6-month to lose with diet exercise not successful started [facility]RAJWINDER BECKY injection 0.5 mg weekly discussed at length side effects including no family history of medullary thyroid carcinoma or endocrine neoplasm syndrome type II advised the side effect nausea vomiting pancreatitis at length

## 2023-08-16 NOTE — ASSESSMENT & PLAN NOTE
Gastric bypass done about 27 years ago now BMI 30 she cannot lose weight with diet exercise counseling done started [facility] BECKY injection 0.5 weekly explained the side effects at length

## 2023-08-16 NOTE — ASSESSMENT & PLAN NOTE
No change Percocet 10/325 4 times a day was refilled    All medical records reviewed and reconciled patient PDMP database reviewed to ensure compliant with the treatment plan for pain management benefits due to the fact that patient has not responded to other measures for pain control so far and severe the pain is significant and interfering with normal daily activities I believe it is reasonable and appropriate to continue the controlled substance in order to improve his ability to function in enhance quality of life the patient has signed a narcotic agreement patient should it was utilize 1 pharmacy and not receiving narcotic from any other provider patient verbalizes understanding that breaching the contract will affect future prescription decision making and critical results in the dismissal from the practice if specifically the has been explained in understood to patient patient demonstrates following informed use of appropriate medicine to analgesia increase activity explained the side effects recommended that thisto the patient patient understands that also advised that appeared to expose him order an abuse and addiction and overdose counseling provided for prevention of any overuse abuse or addiction

## 2023-08-16 NOTE — PROGRESS NOTES
Dr. Ochoa Pearl Office Visit Note  23     Crystal Mask 62 y.o. male MRN: [id number]  : 1965    Assessment:     1. Morbid obesity due to excess calories [facility])  Assessment & Plan:  Counseling done to lose weight diet exercise tried more than 6-month to lose with diet exercise not successful started [facility] BECKY injection 0.5 mg weekly discussed at length side effects including no family history of medullary thyroid carcinoma or endocrine neoplasm syndrome type II advised the side effect nausea vomiting pancreatitis at length    Orders:  -     Semaglutide-Weight Management (WEGOVY) 0.5 MG/0.5ML; Inject 0.5 mL (0.5 mg total) under the skin once a week    2. Benign essential hypertension  Assessment & Plan:  Blood pressure control continue low-salt diet Maxide Soni    Orders:  -     Semaglutide-Weight Management (WEGOVY) 0.5 MG/0.5ML; Inject 0.5 mL (0.5 mg total) under the skin once a week    3. Lumbar foraminal stenosis  Assessment & Plan:  Percocet 10-3 25 refilled the same as under lumbar disc herniation    Orders:  -     oxyCODONE-acetaminophen (PERCOCET)  mg per tablet; Take 1 tablet by mouth every 8 (eight) hours as needed for severe pain Max Daily Amount: 3 tablets  -     Semaglutide-Weight Management (WEGOVY) 0.5 MG/0.5ML; Inject 0.5 mL (0.5 mg total) under the skin once a week    4. DDD (degenerative disc disease), lumbar  -     oxyCODONE-acetaminophen (PERCOCET)  mg per tablet; Take 1 tablet by mouth every 8 (eight) hours as needed for severe pain Max Daily Amount: 3 tablets  -     Semaglutide-Weight Management (WEGOVY) 0.5 MG/0.5ML; Inject 0.5 mL (0.5 mg total) under the skin once a week    5.  Lumbar disc herniation  Assessment & Plan:  No change Percocet 10/325 4 times a day was refilled    All medical records reviewed and reconciled patient [address] [facility] reviewed to ensure compliant with the treatment plan for pain management benefits due to the fact that patient has not responded to other measures for pain control so far and severe the pain is significant and interfering with normal daily activities I believe it is reasonable and appropriate to continue the controlled substance in order to improve his ability to function in enhance quality of life the patient has signed a narcotic agreement patient should it was utilize 1 pharmacy and not receiving narcotic from any other provider patient verbalizes understanding that breaching the contract will affect future prescription decision making and critical results in the dismissal from the practice if specifically the has been explained in understood to patient patient demonstrates following informed use of appropriate medicine to analgesia increase activity explained the side effects recommended that thisto the patient patient understands that also advised that appeared to expose him order an abuse and addiction and overdose counseling provided for prevention of any overuse abuse or addiction    Orders:  -     oxyCODONE-acetaminophen (PERCOCET)  mg per tablet; Take 1 tablet by mouth every 8 (eight) hours as needed for severe pain Max Daily Amount: 3 tablets    6. Gastric bypass status for obesity  Assessment & Plan:  Gastric bypass done about 27 years ago now BMI 30 she cannot lose weight with diet exercise counseling done started Katerin crest injection 0.5 weekly explained the side effects at length            Discussion Summary and Plan: Today's care plan and medications were reviewed with patient in detail and all their questions answered to their satisfaction. Chief Complaint   Patient presents with   • Follow-up     Would like to try the wegovy for weight loss.       Subjective:  Came in follow-up chronic medical condition listed under visit diagnosis not able to lose weight with diet exercise his comorbid condition including lumbar radiculopathy hypertension hyperlipidemia we will start with the group Wegovy injection 0.5 mg weekly explained the side effects at length and also refilled Percocet 10-3 25 4 times a day this regimen helping patient to work full-time and function day-to-day activities      The following portions of the patient's history were reviewed and updated as appropriate: allergies, current medications, past family history, past medical history, past social history, past surgical history and problem list.    Review of Systems   Constitutional: Negative for activity change, appetite change, chills, diaphoresis, fatigue, fever and unexpected weight change. HENT: Negative for congestion, dental problem, drooling, ear discharge, ear pain, facial swelling, hearing loss, mouth sores, nosebleeds, postnasal drip, rhinorrhea, sinus pressure, sneezing, sore throat, tinnitus, trouble swallowing and voice change. Eyes: Negative for photophobia, pain, discharge, redness, itching and visual disturbance. Respiratory: Negative for apnea, cough, choking, chest tightness, shortness of breath, wheezing and stridor. Cardiovascular: Negative for chest pain, palpitations and leg swelling. Gastrointestinal: Negative for abdominal distention, abdominal pain, anal bleeding, blood in stool, constipation, diarrhea, nausea, rectal pain and vomiting. Endocrine: Negative for cold intolerance, heat intolerance, polydipsia, polyphagia and polyuria. Genitourinary: Negative for decreased urine volume, difficulty urinating, dysuria, enuresis, flank pain, frequency, genital sores, hematuria and urgency. Musculoskeletal: Positive for arthralgias, back pain, gait problem, joint swelling and myalgias. Negative for neck pain and neck stiffness. Skin: Negative for color change, pallor, rash and wound. Allergic/Immunologic: Negative. Negative for environmental allergies, food allergies and immunocompromised state.    Neurological: Negative for dizziness, tremors, seizures, syncope, facial asymmetry, speech difficulty, weakness, light-headedness, numbness and headaches. Psychiatric/Behavioral: Negative for agitation, behavioral problems, confusion, decreased concentration, dysphoric mood, hallucinations, self-injury, sleep disturbance and suicidal ideas. The patient is not nervous/anxious and is not hyperactive. Historical Information   Patient Active Problem List   Diagnosis   • Benign essential hypertension   • Benign prostatic hyperplasia   • DDD (degenerative disc disease), lumbar   • Incomplete emptying of bladder   • Lumbar disc herniation   • Lumbar foraminal stenosis   • Neuropathy   • Chronic intractable pain   • Mild episode of recurrent major depressive disorder (HCC)   • Hematospermia   • Prostatitis   • Other male erectile dysfunction   • Annual physical exam   • Primary osteoarthritis of both knees   • Chronic cough   • Non-seasonal allergic rhinitis due to pollen   • Continuous opioid dependence (HCC)   • Pain   • Swelling   • Intermittent claudication due to atherosclerosis of artery of extremity (HCC)   • Lumbar spondylosis   • Gastric bypass status for obesity   • Morbid obesity due to excess calories (HCC)     Past Medical History:   Diagnosis Date   • Abdominal pain    • Anxiety disorder    • Arthritis    • Back pain    • Dizziness    • Headache    • Hypertension    • Lightheadedness    • Obesity    • Palpitations    • SOB (shortness of breath)      Past Surgical History:   Procedure Laterality Date   • CHOLECYSTECTOMY     • GASTRIC BYPASS       Social History     Substance and Sexual Activity   Alcohol Use None    Comment: none per Isac     Social History     Substance and Sexual Activity   Drug Use Not on file    Comment: none per Isac     Social History     Tobacco Use   Smoking Status Never   Smokeless Tobacco Never     History reviewed. No pertinent family history.   Health Maintenance Due   Topic   • Hepatitis C Screening    • HIV Screening    • COVID-19 Vaccine (6 - Moderna series)   • Influenza Vaccine (1)      Meds/Allergies Current Outpatient Medications:   •  amlodipine-olmesartan (Soni) 5-40 MG, Take 1 tablet by mouth daily, Disp: 90 tablet, Rfl: 2  •  celecoxib (CeleBREX) 200 mg capsule, Take 1 capsule (200 mg total) by mouth daily, Disp: 90 capsule, Rfl: 2  •  cetirizine (ZyrTEC) 10 mg tablet, Take 1 tablet (10 mg total) by mouth daily, Disp: 30 tablet, Rfl: 2  •  escitalopram (LEXAPRO) 20 mg tablet, Take 1 tablet (20 mg total) by mouth daily, Disp: 90 tablet, Rfl: 0  •  fluticasone (FLONASE) 50 mcg/act nasal spray, 1 spray into each nostril daily, Disp: 18.2 mL, Rfl: 2  •  oxyCODONE-acetaminophen (PERCOCET)  mg per tablet, Take 1 tablet by mouth every 8 (eight) hours as needed for severe pain Max Daily Amount: 3 tablets, Disp: 90 tablet, Rfl: 0  •  pramipexole (MIRAPEX) 0.5 mg tablet, Take 1 tablet (0.5 mg total) by mouth daily at bedtime, Disp: 30 tablet, Rfl: 5  •  pregabalin (LYRICA) 100 mg capsule, Take 1 capsule (100 mg total) by mouth 2 (two) times a day, Disp: 180 capsule, Rfl: 0  •  Semaglutide-Weight Management (WEGOVY) 0.5 MG/0.5ML, Inject 0.5 mL (0.5 mg total) under the skin once a week, Disp: 2 mL, Rfl: 0  •  sildenafil (Viagra) 100 mg tablet, Take 1 tablet (100 mg total) by mouth daily as needed for erectile dysfunction, Disp: 10 tablet, Rfl: 55  •  tamsulosin (FLOMAX) 0.4 mg, Take 1 capsule (0.4 mg total) by mouth daily with dinner, Disp: 90 capsule, Rfl: 2  •  triamterene-hydrochlorothiazide (MAXZIDE-25) 37.5-25 mg per tablet, Take 1 tablet by mouth daily, Disp: 90 tablet, Rfl: 2  •  neomycin-polymyxin-hydrocortisone (CORTISPORIN) 0.35%-10,000 units/mL-1% otic suspension, Administer 3 drops into the left ear 4 (four) times a day for 7 days, Disp: 10 mL, Rfl: 0      Objective:    Vitals:   /80   Pulse 60   Temp 98 °F (36.7 °C)   Resp 15   Ht 5' 11" (1.803 m)   Wt 104 kg (229 lb)   SpO2 100%   BMI 31.94 kg/m²   Body mass index is 31.94 kg/m².   Vitals:    08/16/23 1043   Weight: 104 kg (229 lb) Physical Exam  Vitals and nursing note reviewed. Constitutional:       General: He is not in acute distress. Appearance: He is well-developed. He is not ill-appearing, toxic-appearing or diaphoretic. HENT:      Head: Normocephalic and atraumatic. Right Ear: External ear normal.      Left Ear: External ear normal.      Nose: Nose normal.      Mouth/Throat:      Pharynx: No oropharyngeal exudate. Eyes:      General: Lids are normal. Lids are everted, no foreign bodies appreciated. No scleral icterus. Right eye: No discharge. Left eye: No discharge. Conjunctiva/sclera: Conjunctivae normal.      Pupils: Pupils are equal, round, and reactive to light. Neck:      Thyroid: No thyromegaly. Vascular: Normal carotid pulses. No carotid bruit, hepatojugular reflux or JVD. Trachea: No tracheal tenderness or tracheal deviation. Cardiovascular:      Rate and Rhythm: Normal rate and regular rhythm. Pulses: Normal pulses. Heart sounds: Normal heart sounds. No murmur heard. No friction rub. No gallop. Pulmonary:      Effort: Pulmonary effort is normal. No respiratory distress. Breath sounds: Normal breath sounds. No stridor. No wheezing or rales. Chest:      Chest wall: No tenderness. Abdominal:      General: Bowel sounds are normal. There is no distension. Palpations: Abdomen is soft. There is no mass. Tenderness: There is no abdominal tenderness. There is no guarding or rebound. Musculoskeletal:         General: No tenderness or deformity. Normal range of motion. Cervical back: Normal range of motion and neck supple. No edema, erythema or rigidity. No spinous process tenderness or muscular tenderness. Normal range of motion. Lymphadenopathy:      Head:      Right side of head: No submental, submandibular, tonsillar, preauricular or posterior auricular adenopathy.       Left side of head: No submental, submandibular, tonsillar, preauricular, posterior auricular or occipital adenopathy. Cervical: No cervical adenopathy. Right cervical: No superficial, deep or posterior cervical adenopathy. Left cervical: No superficial, deep or posterior cervical adenopathy. Upper Body:      Right upper body: No pectoral adenopathy. Left upper body: No pectoral adenopathy. Skin:     General: Skin is warm and dry. Coloration: Skin is not pale. Findings: No erythema or rash. Neurological:      General: No focal deficit present. Mental Status: He is alert and oriented to person, place, and time. Cranial Nerves: No cranial nerve deficit. Sensory: No sensory deficit. Motor: No tremor, abnormal muscle tone or seizure activity. Coordination: Coordination normal.      Gait: Gait normal.      Deep Tendon Reflexes: Reflexes are normal and symmetric. Reflexes normal.   Psychiatric:         Behavior: Behavior normal.         Thought Content: Thought content normal.         Judgment: Judgment normal.         Lab Review   No visits with results within 2 Month(s) from this visit. Latest known visit with results is:   No results found for any previous visit. There are no Patient Instructions on file for this visit. Ronnie Crow MD        "This note has been constructed using a voice recognition system. Therefore there may be syntax, spelling, and/or grammatical errors.  Please call if you have any questions. "

## 2023-09-20 ENCOUNTER — OFFICE VISIT (OUTPATIENT)
Dept: INTERNAL MEDICINE CLINIC | Facility: CLINIC | Age: 58
End: 2023-09-20
Payer: COMMERCIAL

## 2023-09-20 VITALS
BODY MASS INDEX: 32.76 KG/M2 | HEIGHT: 71 IN | TEMPERATURE: 98 F | SYSTOLIC BLOOD PRESSURE: 126 MMHG | HEART RATE: 81 BPM | RESPIRATION RATE: 16 BRPM | OXYGEN SATURATION: 97 % | DIASTOLIC BLOOD PRESSURE: 78 MMHG | WEIGHT: 234 LBS

## 2023-09-20 DIAGNOSIS — J30.1 ALLERGIC RHINITIS DUE TO POLLEN, UNSPECIFIED SEASONALITY: ICD-10-CM

## 2023-09-20 DIAGNOSIS — T78.40XA ALLERGIC REACTION, INITIAL ENCOUNTER: ICD-10-CM

## 2023-09-20 DIAGNOSIS — F11.20 UNCOMPLICATED OPIOID DEPENDENCE (HCC): Primary | ICD-10-CM

## 2023-09-20 DIAGNOSIS — I10 BENIGN ESSENTIAL HYPERTENSION: ICD-10-CM

## 2023-09-20 DIAGNOSIS — R35.0 BENIGN PROSTATIC HYPERPLASIA WITH URINARY FREQUENCY: ICD-10-CM

## 2023-09-20 DIAGNOSIS — E66.01 MORBID OBESITY DUE TO EXCESS CALORIES (HCC): ICD-10-CM

## 2023-09-20 DIAGNOSIS — N40.1 BENIGN PROSTATIC HYPERPLASIA WITH URINARY FREQUENCY: ICD-10-CM

## 2023-09-20 DIAGNOSIS — M48.061 LUMBAR FORAMINAL STENOSIS: ICD-10-CM

## 2023-09-20 DIAGNOSIS — M51.36 DDD (DEGENERATIVE DISC DISEASE), LUMBAR: ICD-10-CM

## 2023-09-20 DIAGNOSIS — I70.219 INTERMITTENT CLAUDICATION DUE TO ATHEROSCLEROSIS OF ARTERY OF EXTREMITY (HCC): ICD-10-CM

## 2023-09-20 DIAGNOSIS — N40.0 BENIGN PROSTATIC HYPERPLASIA, UNSPECIFIED WHETHER LOWER URINARY TRACT SYMPTOMS PRESENT: ICD-10-CM

## 2023-09-20 DIAGNOSIS — M51.26 LUMBAR DISC HERNIATION: ICD-10-CM

## 2023-09-20 PROCEDURE — 99214 OFFICE O/P EST MOD 30 MIN: CPT | Performed by: INTERNAL MEDICINE

## 2023-09-20 RX ORDER — OXYCODONE AND ACETAMINOPHEN 10; 325 MG/1; MG/1
1 TABLET ORAL EVERY 8 HOURS PRN
Qty: 90 TABLET | Refills: 0 | Status: SHIPPED | OUTPATIENT
Start: 2023-09-20

## 2023-09-20 RX ORDER — AMLODIPINE AND OLMESARTAN MEDOXOMIL 5; 40 MG/1; MG/1
1 TABLET ORAL DAILY
Qty: 90 TABLET | Refills: 2 | Status: SHIPPED | OUTPATIENT
Start: 2023-09-20

## 2023-09-20 RX ORDER — CETIRIZINE HYDROCHLORIDE 10 MG/1
10 TABLET ORAL DAILY
Qty: 30 TABLET | Refills: 2 | Status: SHIPPED | OUTPATIENT
Start: 2023-09-20

## 2023-09-20 RX ORDER — CELECOXIB 200 MG/1
200 CAPSULE ORAL DAILY
Qty: 90 CAPSULE | Refills: 2 | Status: SHIPPED | OUTPATIENT
Start: 2023-09-20

## 2023-09-20 RX ORDER — METHYLPREDNISOLONE 4 MG/1
TABLET ORAL
Qty: 21 EACH | Refills: 0 | Status: SHIPPED | OUTPATIENT
Start: 2023-09-20

## 2023-09-20 RX ORDER — TAMSULOSIN HYDROCHLORIDE 0.4 MG/1
0.4 CAPSULE ORAL
Qty: 90 CAPSULE | Refills: 2 | Status: SHIPPED | OUTPATIENT
Start: 2023-09-20

## 2023-09-20 NOTE — PATIENT INSTRUCTIONS
Chronic Pain   WHAT YOU NEED TO KNOW:   Chronic pain is pain that does not get better for 3 months or longer. Chronic pain may hurt all the time, or come and go. DISCHARGE INSTRUCTIONS:   Call your local emergency number, or have someone else call (911 in the [address]) if:   You are breathing slower than normal, or you have trouble breathing. You cannot be awakened. You have a seizure. Call your doctor if:   Your heart feels like it is jumping or fluttering. You cannot think clearly. You have side effects from prescription pain medicine, such as itching, nausea, or vomiting. You have trouble sleeping. Your pain gets worse, even after you take medicine. You don't think the medicine is working. You have questions or concerns about your condition or care. Medicines: You may need any of the following:  Acetaminophen  decreases pain and fever. It is available without a doctor's order. Ask how much to take and how often to take it. Follow directions. Read the labels of all other medicines you are using to see if they also contain acetaminophen, or ask your doctor or pharmacist. Acetaminophen can cause liver damage if not taken correctly. NSAIDs , such as ibuprofen, help decrease swelling, pain, and fever. This medicine is available with or without a doctor's order. NSAIDs can cause stomach bleeding or kidney problems in certain people. If you take blood thinner medicine, always ask your healthcare provider if NSAIDs are safe for you. Always read the medicine label and follow directions. Prescription pain medicine  called narcotics or opioids may be given for certain types of chronic pain. Ask your healthcare provider how to take this medicine safely. Anesthetics  can be rubbed on your skin or injected into a nerve or muscle to numb an area. Other medicines  may reduce pain, anxiety, muscle tension, or swelling. Take your medicine as directed.   Contact your healthcare provider if you think your medicine is not helping or if you have side effects. Tell your provider if you are allergic to any medicine. Keep a list of the medicines, vitamins, and herbs you take. Include the amounts, and when and why you take them. Bring the list or the pill bottles to follow-up visits. Carry your medicine list with you in case of an emergency. Manage your chronic pain:   Apply heat  on the area in pain for 20 to 30 minutes every 2 hours for as many days as directed. Heat helps decrease pain and muscle spasms. Apply ice  on the part of your body that hurts for 15 to 20 minutes every hour or as directed. Use an ice pack, or put crushed ice in a plastic bag. Cover it with a towel. Ice decreases pain and swelling, and helps prevent tissue damage. Go to physical therapy as directed. A physical therapist teaches you exercises to help improve movement and strength, and to decrease pain. Exercise for 30 minutes, 3 times a week. Regular physical activity can help decrease pain and improve your quality of life. Ask your healthcare provider about the best exercise plan for your type of pain. Get enough sleep. Create a relaxing bedtime routine. Go to sleep and wake up at the same time every day. Avoid caffeine in the afternoon. Talk with a counselor or therapist.  A type of counseling called cognitive behavioral therapy (CBT) can help your chronic pain by changing the way you think about it. CBT can also improve your mood, sleep, and ability to move. What you must know if you take narcotic pain medicine: You may need to take a bowel movement softener. The most common side effect of prescription pain medicine is constipation. Bowel movement softeners are available over the counter. Do not mix prescription pain medicines. This can cause an overdose of medicine, which can become life-threatening. Read labels. Make sure you know the ingredients in all of your medicines.     Do not drink alcohol when you take prescription pain medicine. It is not safe to mix narcotics or opioids with alcohol or illegal drugs. Prescription pain medicine may impair your ability to drive or work safely. They may also cause dizziness and increase your risk for falling. Store prescription pain medicine in a safe location at home. Keep your medicine away from children and other people. Never share your medicine with anyone. Follow up with your doctor as directed: You may be referred to a pain specialist. Write down your questions so you remember to ask them during your visits. © Copyright Elisha Goltz 2023 Information is for End User's use only and may not be sold, redistributed or otherwise used for commercial purposes. The above information is an  only. It is not intended as medical advice for individual conditions or treatments. Talk to your doctor, nurse or pharmacist before following any medical regimen to see if it is safe and effective for you.

## 2023-09-20 NOTE — ASSESSMENT & PLAN NOTE
Refill Percocet 10-3 25 4 times a day as needed remaining follow-up plan finding same as under lumbar disc herniation

## 2023-09-20 NOTE — ASSESSMENT & PLAN NOTE
No change Percocet 10/325 4 times a day was refilled today remaining follow-up plan finding same as follows:             All medical records reviewed and reconciled patient PDMP database reviewed to ensure compliant with the treatment plan for pain management benefits due to the fact that patient has not responded to other measures for pain control so far and severe the pain is significant and interfering with normal daily activities I believe it is reasonable and appropriate to continue the controlled substance in order to improve his ability to function in enhance quality of life the patient has signed a narcotic agreement patient should it was utilize 1 pharmacy and not receiving narcotic from any other provider patient verbalizes understanding that breaching the contract will affect future prescription decision making and critical results in the dismissal from the practice if specifically the has been explained in understood to patient patient demonstrates following informed use of appropriate medicine to analgesia increase activity explained the side effects recommended that thisto the patient patient understands that also advised that appeared to expose him order an abuse and addiction and overdose counseling provided for prevention of any overuse abuse or addiction

## 2023-09-20 NOTE — PROGRESS NOTES
Dr. Taylor Moore Office Visit Note  23     On license of [facility] 62 y.o. male MRN: [id number]  : 1965    Assessment:     1. Uncomplicated opioid dependence ([address])  Assessment & Plan:  No evidence of any overuse abuse or addiction patient on Percocet 10-3 25 4 times a day this regimen helping patient carry out day-to-day activities and function and work full-time pain contract renewed urine drug screen done results pending    Orders:  -     [facility] All Prescribed Meds and Special Instructions  -     Amphetamines, Methamphetamines  -     Butalbital  -     Phenobarbital  -     Secobarbital  -     Alprazolam  -     Clonazepam  -     Diazepam  -     Lorazepam  -     Gabapentin  -     Pregabalin  -     Cocaine  -     Heroin  -     Buprenorphine  -     Levorphanol  -     Meperidine  -     Naltrexone  -     Fentanyl  -     Methadone  -     Oxycodone  -     Tapentadol  -     THC  -     Tramadol  -     Codeine, Hydrocodone, Hydropmorphone, Morphine  -     Bath Salts  -     Ethyl Glucuronide/Ethyl Sulfate  -     Kratom  -     Spice  -     Methylphenidate  -     Phentermine  -     Validity Oxidant  -     Validity Creatinine  -     Validity pH  -     Validity Specific    2. Benign essential hypertension  Assessment & Plan:  Continue low-salt diet and Maxide and Soni blood pressure controlled    Orders:  -     amlodipine-olmesartan (Soni) 5-40 MG; Take 1 tablet by mouth daily    3. Benign prostatic hyperplasia with urinary frequency  Assessment & Plan:  Symptoms controlled continue Flomax evaluated by urology      4. Intermittent claudication due to atherosclerosis of artery of extremity (HCC)  Assessment & Plan:  Still awaiting lower extremity arterial duplex      5.  Morbid obesity due to excess calories ([address])  Assessment & Plan:  BMI 32.16 counseling done to lose weight patient tried diet exercise change in lifestyle no success not able to lose weight history of gastric bypass almost 27 years ago discussed Wegovy injection at length patient is has a comorbid condition including hyperlipidemia hypertension      6. Benign prostatic hyperplasia, unspecified whether lower urinary tract symptoms present  Assessment & Plan:  Symptoms controlled continue Flomax evaluated by urology    Orders:  -     tamsulosin (FLOMAX) 0.4 mg; Take 1 capsule (0.4 mg total) by mouth daily with dinner  -     celecoxib (CeleBREX) 200 mg capsule; Take 1 capsule (200 mg total) by mouth daily    7. Lumbar foraminal stenosis  Assessment & Plan:  Refill Percocet 10-3 25 4 times a day as needed remaining follow-up plan finding same as under lumbar disc herniation    Orders:  -     oxyCODONE-acetaminophen (PERCOCET)  mg per tablet; Take 1 tablet by mouth every 8 (eight) hours as needed for severe pain Max Daily Amount: 3 tablets    8. DDD (degenerative disc disease), lumbar  -     oxyCODONE-acetaminophen (PERCOCET)  mg per tablet; Take 1 tablet by mouth every 8 (eight) hours as needed for severe pain Max Daily Amount: 3 tablets    9. Lumbar disc herniation  Assessment & Plan:  No change Percocet 10/325 4 times a day was refilled today remaining follow-up plan finding same as follows:             All medical records reviewed and reconciled patient PDMP database reviewed to ensure compliant with the treatment plan for pain management benefits due to the fact that patient has not responded to other measures for pain control so far and severe the pain is significant and interfering with normal daily activities I believe it is reasonable and appropriate to continue the controlled substance in order to improve his ability to function in enhance quality of life the patient has signed a narcotic agreement patient should it was utilize 1 pharmacy and not receiving narcotic from any other provider patient verbalizes understanding that breaching the contract will affect future prescription decision making and critical results in the dismissal from the practice if specifically the has been explained in understood to patient patient demonstrates following informed use of appropriate medicine to analgesia increase activity explained the side effects recommended that thisto the patient patient understands that also advised that appeared to expose him order an abuse and addiction and overdose counseling provided for prevention of any overuse abuse or addiction    Orders:  -     oxyCODONE-acetaminophen (PERCOCET)  mg per tablet; Take 1 tablet by mouth every 8 (eight) hours as needed for severe pain Max Daily Amount: 3 tablets  -     methylPREDNISolone 4 MG tablet therapy pack; Use as directed on package    10. Allergic rhinitis due to pollen, unspecified seasonality  -     cetirizine (ZyrTEC) 10 mg tablet; Take 1 tablet (10 mg total) by mouth daily  -     methylPREDNISolone 4 MG tablet therapy pack; Use as directed on package    11. Allergic reaction, initial encounter  Assessment & Plan:  Raised minute maculopapular rash with itching generalized all extremities trunk claims has come in contact with some plant treated with Zyrtec and Medrol Dosepak over-the-counter Benadryl    Orders:  -     methylPREDNISolone 4 MG tablet therapy pack; Use as directed on package          Discussion Summary and Plan: Today's care plan and medications were reviewed with patient in detail and all their questions answered to their satisfaction. Chief Complaint   Patient presents with   • Rash     Rash all over face, arms, ears. Very itchy. Wants lab orders.       Subjective:  Patient came in follow-up chronic medical condition listed under visit diagnosis still awaiting lower EXTR arterial duplex although patient claims symptoms of intermittent claudication has improved also refill for Percocet 10-3 25 4 times a day this regimen helping patient to function carry out day-to-day activities and work full-time also pain contract renewed drug screen done results pending and also patient claims his itching erythematous maculopapular very tiny rash all over the trunk and extremity has come in contact with some plants does not remember the detail treated with Medrol Dosepak Benadryl and Zyrtec      The following portions of the patient's history were reviewed and updated as appropriate: allergies, current medications, past family history, past medical history, past social history, past surgical history and problem list.    Review of Systems   Constitutional: Positive for fatigue. Negative for activity change, appetite change, chills, diaphoresis, fever and unexpected weight change. HENT: Negative for dental problem, drooling, ear discharge, ear pain, facial swelling, hearing loss, mouth sores, nosebleeds, sneezing, tinnitus, trouble swallowing and voice change. Eyes: Negative for photophobia, pain, discharge, redness, itching and visual disturbance. Respiratory: Negative for apnea, choking, chest tightness, shortness of breath, wheezing and stridor. Cardiovascular: Negative for chest pain, palpitations and leg swelling. Gastrointestinal: Negative for abdominal distention, abdominal pain, anal bleeding, blood in stool, constipation, diarrhea, nausea, rectal pain and vomiting. Endocrine: Negative for cold intolerance, heat intolerance, polydipsia, polyphagia and polyuria. Genitourinary: Negative for decreased urine volume, difficulty urinating, dysuria, enuresis, flank pain, frequency, genital sores, hematuria and urgency. Musculoskeletal: Positive for arthralgias and back pain. Negative for neck pain and neck stiffness. Skin: Positive for rash. Negative for color change, pallor and wound. Allergic/Immunologic: Negative. Negative for environmental allergies, food allergies and immunocompromised state. Neurological: Negative for dizziness, tremors, seizures, syncope, facial asymmetry, speech difficulty, weakness, light-headedness, numbness and headaches.    Psychiatric/Behavioral: Negative for agitation, behavioral problems, confusion, decreased concentration, dysphoric mood, hallucinations, self-injury, sleep disturbance and suicidal ideas. The patient is not nervous/anxious and is not hyperactive. Historical Information   Patient Active Problem List   Diagnosis   • Benign essential hypertension   • Benign prostatic hyperplasia   • DDD (degenerative disc disease), lumbar   • Incomplete emptying of bladder   • Lumbar disc herniation   • Lumbar foraminal stenosis   • Neuropathy   • Chronic intractable pain   • Mild episode of recurrent major depressive disorder (HCC)   • Hematospermia   • Prostatitis   • Other male erectile dysfunction   • Annual physical exam   • Primary osteoarthritis of both knees   • Chronic cough   • Non-seasonal allergic rhinitis due to pollen   • Continuous opioid dependence (HCC)   • Pain   • Swelling   • Intermittent claudication due to atherosclerosis of artery of extremity (HCC)   • Lumbar spondylosis   • Gastric bypass status for obesity   • Morbid obesity due to excess calories (HCC)   • Uncomplicated opioid dependence ([address])   • Allergic reaction     Past Medical History:   Diagnosis Date   • Abdominal pain    • Anxiety disorder    • Arthritis    • Back pain    • Dizziness    • Headache    • Hypertension    • Lightheadedness    • Obesity    • Palpitations    • SOB (shortness of breath)      Past Surgical History:   Procedure Laterality Date   • CHOLECYSTECTOMY     • GASTRIC BYPASS       Social History     Substance and Sexual Activity   Alcohol Use None    Comment: none per [facility]     Social History     Substance and Sexual Activity   Drug Use Not on file    Comment: none per [facility]     Social History     Tobacco Use   Smoking Status Never   Smokeless Tobacco Never     No family history on file.   Health Maintenance Due   Topic   • Hepatitis C Screening    • HIV Screening    • COVID-19 Vaccine (6 - Moderna series)   • Influenza Vaccine (1)   • Depression Remission PHQ       Meds/Allergies       Current Outpatient Medications:   •  amlodipine-olmesartan (Soni) 5-40 MG, Take 1 tablet by mouth daily, Disp: 90 tablet, Rfl: 2  •  celecoxib (CeleBREX) 200 mg capsule, Take 1 capsule (200 mg total) by mouth daily, Disp: 90 capsule, Rfl: 2  •  cetirizine (ZyrTEC) 10 mg tablet, Take 1 tablet (10 mg total) by mouth daily, Disp: 30 tablet, Rfl: 2  •  escitalopram (LEXAPRO) 20 mg tablet, Take 1 tablet (20 mg total) by mouth daily, Disp: 90 tablet, Rfl: 0  •  fluticasone (FLONASE) 50 mcg/act nasal spray, 1 spray into each nostril daily, Disp: 18.2 mL, Rfl: 2  •  methylPREDNISolone 4 MG tablet therapy pack, Use as directed on package, Disp: 21 each, Rfl: 0  •  oxyCODONE-acetaminophen (PERCOCET)  mg per tablet, Take 1 tablet by mouth every 8 (eight) hours as needed for severe pain Max Daily Amount: 3 tablets, Disp: 90 tablet, Rfl: 0  •  pramipexole (MIRAPEX) 0.5 mg tablet, Take 1 tablet (0.5 mg total) by mouth daily at bedtime, Disp: 30 tablet, Rfl: 5  •  pregabalin (LYRICA) 100 mg capsule, Take 1 capsule (100 mg total) by mouth 2 (two) times a day, Disp: 180 capsule, Rfl: 0  •  sildenafil (Viagra) 100 mg tablet, Take 1 tablet (100 mg total) by mouth daily as needed for erectile dysfunction, Disp: 10 tablet, Rfl: 55  •  tamsulosin (FLOMAX) 0.4 mg, Take 1 capsule (0.4 mg total) by mouth daily with dinner, Disp: 90 capsule, Rfl: 2  •  triamterene-hydrochlorothiazide (MAXZIDE-25) 37.5-25 mg per tablet, Take 1 tablet by mouth daily, Disp: 90 tablet, Rfl: 2  •  neomycin-polymyxin-hydrocortisone (CORTISPORIN) 0.35%-10,000 units/mL-1% otic suspension, Administer 3 drops into the left ear 4 (four) times a day for 7 days, Disp: 10 mL, Rfl: 0      Objective:    Vitals:   /78   Pulse 81   Temp 98 °F (36.7 °C)   Resp 16   Ht 5' 11" (1.803 m)   Wt 106 kg (234 lb)   SpO2 97%   BMI 32.64 kg/m²   Body mass index is 32.64 kg/m².   Vitals:    09/20/23 1018   Weight: 106 kg (234 lb) Physical Exam  Vitals and nursing note reviewed. Constitutional:       General: He is not in acute distress. Appearance: He is well-developed. He is obese. He is not ill-appearing, toxic-appearing or diaphoretic. HENT:      Head: Normocephalic and atraumatic. Right Ear: External ear normal.      Left Ear: External ear normal.      Nose: Nose normal.      Mouth/Throat:      Pharynx: No oropharyngeal exudate. Eyes:      General: Lids are normal. Lids are everted, no foreign bodies appreciated. No scleral icterus. Right eye: No discharge. Left eye: No discharge. Conjunctiva/sclera: Conjunctivae normal.      Pupils: Pupils are equal, round, and reactive to light. Neck:      Thyroid: No thyromegaly. Vascular: Normal carotid pulses. No carotid bruit, hepatojugular reflux or JVD. Trachea: No tracheal tenderness or tracheal deviation. Cardiovascular:      Rate and Rhythm: Normal rate and regular rhythm. Pulses: Normal pulses. Heart sounds: Normal heart sounds. No murmur heard. No friction rub. No gallop. Pulmonary:      Effort: Pulmonary effort is normal. No respiratory distress. Breath sounds: Normal breath sounds. No stridor. No wheezing or rales. Chest:      Chest wall: No tenderness. Abdominal:      General: Bowel sounds are normal. There is no distension. Palpations: Abdomen is soft. There is no mass. Tenderness: There is no abdominal tenderness. There is no guarding or rebound. Musculoskeletal:         General: No tenderness or deformity. Normal range of motion. Cervical back: Normal range of motion and neck supple. No edema, erythema or rigidity. No spinous process tenderness or muscular tenderness. Normal range of motion. Lymphadenopathy:      Head:      Right side of head: No submental, submandibular, tonsillar, preauricular or posterior auricular adenopathy.       Left side of head: No submental, submandibular, tonsillar, preauricular, posterior auricular or occipital adenopathy. Cervical: No cervical adenopathy. Right cervical: No superficial, deep or posterior cervical adenopathy. Left cervical: No superficial, deep or posterior cervical adenopathy. Upper Body:      Right upper body: No pectoral adenopathy. Left upper body: No pectoral adenopathy. Skin:     General: Skin is warm and dry. Coloration: Skin is not pale. Findings: No erythema or rash. Neurological:      General: No focal deficit present. Mental Status: He is alert and oriented to person, place, and time. Cranial Nerves: No cranial nerve deficit. Sensory: No sensory deficit. Motor: No tremor, abnormal muscle tone or seizure activity. Coordination: Coordination normal.      Gait: Gait abnormal.      Deep Tendon Reflexes: Reflexes are normal and symmetric. Reflexes normal.   Psychiatric:         Behavior: Behavior normal.         Thought Content: Thought content normal.         Judgment: Judgment normal.         Lab Review   No visits with results within 2 Month(s) from this visit. Latest known visit with results is:   No results found for any previous visit. Patient Instructions   Chronic Pain   WHAT YOU NEED TO KNOW:   Chronic pain is pain that does not get better for 3 months or longer. Chronic pain may hurt all the time, or come and go. DISCHARGE INSTRUCTIONS:   Call your local emergency number, or have someone else call (911 in the [address]) if:   You are breathing slower than normal, or you have trouble breathing. You cannot be awakened. You have a seizure. Call your doctor if:   Your heart feels like it is jumping or fluttering. You cannot think clearly. You have side effects from prescription pain medicine, such as itching, nausea, or vomiting. You have trouble sleeping. Your pain gets worse, even after you take medicine.     You don't think the medicine is working. You have questions or concerns about your condition or care. Medicines: You may need any of the following:  Acetaminophen  decreases pain and fever. It is available without a doctor's order. Ask how much to take and how often to take it. Follow directions. Read the labels of all other medicines you are using to see if they also contain acetaminophen, or ask your doctor or pharmacist. Acetaminophen can cause liver damage if not taken correctly. NSAIDs , such as ibuprofen, help decrease swelling, pain, and fever. This medicine is available with or without a doctor's order. NSAIDs can cause stomach bleeding or kidney problems in certain people. If you take blood thinner medicine, always ask your healthcare provider if NSAIDs are safe for you. Always read the medicine label and follow directions. Prescription pain medicine  called narcotics or opioids may be given for certain types of chronic pain. Ask your healthcare provider how to take this medicine safely. Anesthetics  can be rubbed on your skin or injected into a nerve or muscle to numb an area. Other medicines  may reduce pain, anxiety, muscle tension, or swelling. Take your medicine as directed. Contact your healthcare provider if you think your medicine is not helping or if you have side effects. Tell your provider if you are allergic to any medicine. Keep a list of the medicines, vitamins, and herbs you take. Include the amounts, and when and why you take them. Bring the list or the pill bottles to follow-up visits. Carry your medicine list with you in case of an emergency. Manage your chronic pain:   Apply heat  on the area in pain for 20 to 30 minutes every 2 hours for as many days as directed. Heat helps decrease pain and muscle spasms. Apply ice  on the part of your body that hurts for 15 to 20 minutes every hour or as directed. Use an ice pack, or put crushed ice in a plastic bag. Cover it with a towel.  Ice decreases pain and swelling, and helps prevent tissue damage. Go to physical therapy as directed. A physical therapist teaches you exercises to help improve movement and strength, and to decrease pain. Exercise for 30 minutes, 3 times a week. Regular physical activity can help decrease pain and improve your quality of life. Ask your healthcare provider about the best exercise plan for your type of pain. Get enough sleep. Create a relaxing bedtime routine. Go to sleep and wake up at the same time every day. Avoid caffeine in the afternoon. Talk with a counselor or therapist.  A type of counseling called cognitive behavioral therapy (CBT) can help your chronic pain by changing the way you think about it. CBT can also improve your mood, sleep, and ability to move. What you must know if you take narcotic pain medicine: You may need to take a bowel movement softener. The most common side effect of prescription pain medicine is constipation. Bowel movement softeners are available over the counter. Do not mix prescription pain medicines. This can cause an overdose of medicine, which can become life-threatening. Read labels. Make sure you know the ingredients in all of your medicines. Do not drink alcohol  when you take prescription pain medicine. It is not safe to mix narcotics or opioids with alcohol or illegal drugs. Prescription pain medicine may impair your ability to drive or work safely. They may also cause dizziness and increase your risk for falling. Store prescription pain medicine in a safe location at home. Keep your medicine away from children and other people. Never share your medicine with anyone. Follow up with your doctor as directed: You may be referred to a pain specialist. Write down your questions so you remember to ask them during your visits.   © Copyright [employer] 2023 Information is for End User's use only and may not be sold, redistributed or otherwise used for commercial purposes. The above information is an  only. It is not intended as medical advice for individual conditions or treatments. Talk to your doctor, nurse or pharmacist before following any medical regimen to see if it is safe and effective for you. Jo Ann Bill MD        "This note has been constructed using a voice recognition system. Therefore there may be syntax, spelling, and/or grammatical errors.  Please call if you have any questions. "

## 2023-09-20 NOTE — ASSESSMENT & PLAN NOTE
No evidence of any overuse abuse or addiction patient on Percocet 10-3 25 4 times a day this regimen helping patient carry out day-to-day activities and function and work full-time pain contract renewed urine drug screen done results pending

## 2023-09-20 NOTE — ASSESSMENT & PLAN NOTE
Raised minute maculopapular rash with itching generalized all extremities trunk claims has come in contact with some plant treated with Zyrtec and Medrol Dosepak over-the-counter Benadryl

## 2023-09-25 LAB
6MAM UR QL CFM: NEGATIVE NG/ML
7AMINOCLONAZEPAM UR QL CFM: NEGATIVE NG/ML
A-OH ALPRAZ UR QL CFM: ABNORMAL NG/ML
ACCEPTABLE CREAT UR QL: NORMAL MG/DL
ACCEPTIBLE SP GR UR QL: NORMAL
AMPHET UR QL CFM: NEGATIVE NG/ML
BUPRENORPHINE UR QL CFM: NEGATIVE NG/ML
BUTALBITAL UR QL CFM: NEGATIVE NG/ML
BZE UR QL CFM: NEGATIVE NG/ML
CODEINE UR QL CFM: NEGATIVE NG/ML
EDDP UR QL CFM: NEGATIVE NG/ML
ETHYL GLUCURONIDE UR QL CFM: NEGATIVE NG/ML
ETHYL SULFATE UR QL SCN: NEGATIVE NG/ML
EUTYLONE UR QL: NEGATIVE NG/ML
FENTANYL UR QL CFM: NEGATIVE NG/ML
GLIADIN IGG SER IA-ACNC: NEGATIVE NG/ML
HYDROCODONE UR QL CFM: NEGATIVE NG/ML
HYDROMORPHONE UR QL CFM: NEGATIVE NG/ML
LORAZEPAM UR QL CFM: NEGATIVE NG/ML
ME-PHENIDATE UR QL CFM: NEGATIVE NG/ML
MEPERIDINE UR QL CFM: NEGATIVE NG/ML
METHADONE UR QL CFM: NEGATIVE NG/ML
METHAMPHET UR QL CFM: NEGATIVE NG/ML
MORPHINE UR QL CFM: NEGATIVE NG/ML
NALTREXONE UR QL CFM: NEGATIVE NG/ML
NITRITE UR QL: NORMAL UG/ML
NORBUPRENORPHINE UR QL CFM: NEGATIVE NG/ML
NORDIAZEPAM UR QL CFM: NEGATIVE NG/ML
NORFENTANYL UR QL CFM: NEGATIVE NG/ML
NORHYDROCODONE UR QL CFM: NEGATIVE NG/ML
NORMEPERIDINE UR QL CFM: NEGATIVE NG/ML
NOROXYCODONE UR QL CFM: NORMAL NG/ML
OXAZEPAM UR QL CFM: NEGATIVE NG/ML
OXYCODONE UR QL CFM: NORMAL NG/ML
OXYMORPHONE UR QL CFM: NORMAL NG/ML
PARA-FLUOROFENTANYL QUANTIFICATION: NORMAL NG/ML
PHENOBARB UR QL CFM: NEGATIVE NG/ML
RESULT ALL_PRESCRIBED MEDS AND SPECIAL INSTRUCTIONS: NORMAL
SECOBARBITAL UR QL CFM: NEGATIVE NG/ML
SL AMB 4-ANPP QUANTIFICATION: NORMAL NG/ML
SL AMB 5F-ADB-M7 METABOLITE QUANTIFICATION: NEGATIVE NG/ML
SL AMB 7-OH-MITRAGYNINE (KRATOM ALKALOID) QUANTIFICATION: NEGATIVE NG/ML
SL AMB AB-FUBINACA-M3 METABOLITE QUANTIFICATION: NEGATIVE NG/ML
SL AMB ACETYL FENTANYL QUANTIFICATION: NORMAL NG/ML
SL AMB ACETYL NORFENTANYL QUANTIFICATION: NORMAL NG/ML
SL AMB ACRYL FENTANYL QUANTIFICATION: NORMAL NG/ML
SL AMB CARFENTANIL QUANTIFICATION: NORMAL NG/ML
SL AMB CTHC (MARIJUANA METABOLITE) QUANTIFICATION: NEGATIVE NG/ML
SL AMB DEXTRORPHAN (DEXTROMETHORPHAN METABOLITE) QUANT: NEGATIVE NG/ML
SL AMB GABAPENTIN QUANTIFICATION: NEGATIVE
SL AMB JWH018 METABOLITE QUANTIFICATION: NEGATIVE NG/ML
SL AMB JWH073 METABOLITE QUANTIFICATION: NEGATIVE NG/ML
SL AMB MDMB-FUBINACA-M1 METABOLITE QUANTIFICATION: NEGATIVE NG/ML
SL AMB METHYLONE QUANTIFICATION: NEGATIVE NG/ML
SL AMB N-DESMETHYL-TRAMADOL QUANTIFICATION: NEGATIVE NG/ML
SL AMB PHENTERMINE QUANTIFICATION: NEGATIVE NG/ML
SL AMB PREGABALIN QUANTIFICATION: ABNORMAL
SL AMB RCS4 METABOLITE QUANTIFICATION: NEGATIVE NG/ML
SL AMB RITALINIC ACID QUANTIFICATION: NEGATIVE NG/ML
SMOOTH MUSCLE AB TITR SER IF: NEGATIVE NG/ML
SPECIMEN DRAWN SERPL: NEGATIVE NG/ML
SPECIMEN PH ACCEPTABLE UR: NORMAL
TAPENTADOL UR QL CFM: NEGATIVE NG/ML
TEMAZEPAM UR QL CFM: NEGATIVE NG/ML
TRAMADOL UR QL CFM: NEGATIVE NG/ML
URATE/CREAT 24H UR: NEGATIVE NG/ML

## 2023-10-18 ENCOUNTER — OFFICE VISIT (OUTPATIENT)
Dept: INTERNAL MEDICINE CLINIC | Facility: CLINIC | Age: 58
End: 2023-10-18
Payer: COMMERCIAL

## 2023-10-18 VITALS
TEMPERATURE: 98 F | HEART RATE: 80 BPM | BODY MASS INDEX: 32.93 KG/M2 | HEIGHT: 71 IN | SYSTOLIC BLOOD PRESSURE: 122 MMHG | DIASTOLIC BLOOD PRESSURE: 78 MMHG | OXYGEN SATURATION: 97 % | WEIGHT: 235.2 LBS

## 2023-10-18 DIAGNOSIS — I10 BENIGN ESSENTIAL HYPERTENSION: ICD-10-CM

## 2023-10-18 DIAGNOSIS — M17.0 PRIMARY OSTEOARTHRITIS OF BOTH KNEES: ICD-10-CM

## 2023-10-18 DIAGNOSIS — M51.36 DDD (DEGENERATIVE DISC DISEASE), LUMBAR: ICD-10-CM

## 2023-10-18 DIAGNOSIS — M48.061 LUMBAR FORAMINAL STENOSIS: ICD-10-CM

## 2023-10-18 DIAGNOSIS — M51.26 LUMBAR DISC HERNIATION: Primary | ICD-10-CM

## 2023-10-18 DIAGNOSIS — I70.219 INTERMITTENT CLAUDICATION DUE TO ATHEROSCLEROSIS OF ARTERY OF EXTREMITY (HCC): ICD-10-CM

## 2023-10-18 PROCEDURE — 99213 OFFICE O/P EST LOW 20 MIN: CPT | Performed by: INTERNAL MEDICINE

## 2023-10-18 RX ORDER — OXYCODONE AND ACETAMINOPHEN 10; 325 MG/1; MG/1
1 TABLET ORAL EVERY 8 HOURS PRN
Qty: 90 TABLET | Refills: 0 | Status: SHIPPED | OUTPATIENT
Start: 2023-10-18

## 2023-10-18 NOTE — ASSESSMENT & PLAN NOTE
Continue low-salt diet and Maxide and Soni blood pressure controlled monitoring blood pressure very well controlled

## 2023-10-18 NOTE — ASSESSMENT & PLAN NOTE
Refill Percocet 10-3 25 4 times a day as needed remaining follow-up plan finding same as under lumbar disc herniation overall unchanged

## 2023-10-18 NOTE — ASSESSMENT & PLAN NOTE
Still awaiting lower extremity arterial duplex until then continue on blood pressure LDL and baby aspirin

## 2023-10-18 NOTE — ASSESSMENT & PLAN NOTE
No change Percocet 10/325 4 times a day was refilled today remaining follow-up plan finding same as follows: Percocet 10-3 25 4 times a day was refilled this regimen helping patient to work full-time and carry out day-to-day activities            All medical records reviewed and reconciled patient PDMP database reviewed to ensure compliant with the treatment plan for pain management benefits due to the fact that patient has not responded to other measures for pain control so far and severe the pain is significant and interfering with normal daily activities I believe it is reasonable and appropriate to continue the controlled substance in order to improve his ability to function in enhance quality of life the patient has signed a narcotic agreement patient should it was utilize 1 pharmacy and not receiving narcotic from any other provider patient verbalizes understanding that breaching the contract will affect future prescription decision making and critical results in the dismissal from the practice if specifically the has been explained in understood to patient patient demonstrates following informed use of appropriate medicine to analgesia increase activity explained the side effects recommended that thisto the patient patient understands that also advised that appeared to expose him order an abuse and addiction and overdose counseling provided for prevention of any overuse abuse or addiction

## 2023-10-18 NOTE — PATIENT INSTRUCTIONS
Hypertension and Diabetes   WHAT YOU NEED TO KNOW:   Hypertension is high blood pressure (BP). Hypertension is common in persons with diabetes. A normal BP is 119/79 or lower. You can control hypertension and diabetes with a healthy lifestyle, or a combination of lifestyle and medicine. Controlled BP and blood sugar levels help prevent certain complications from diabetes. Examples include retinopathy (eye damage) and kidney damage. DISCHARGE INSTRUCTIONS:   Call or have someone call your local emergency number (911 in the [address]) for any of the following: You have any of the following signs of a heart attack:   Squeezing, pressure, or pain in your chest    You may  also have any of the following:     Discomfort or pain in your back, neck, jaw, stomach, or arm    Shortness of breath    Nausea or vomiting    Lightheadedness or a sudden cold sweat  You have any of the following signs of a stroke:   Numbness or drooping on one side of your face     Weakness in an arm or leg    Confusion or difficulty speaking    Dizziness, a severe headache, or vision loss    Seek immediate care if:   You feel faint, dizzy, confused, or drowsy. You have a severe headache or vision loss. Call your doctor or diabetes care team provider if:   You have been taking your BP medicine and your BP is still higher than your healthcare provider says it should be. You have questions or concerns about your condition or care. Medicines: You may  need any of the following:  Medicine  may be used to help lower your BP. You may need more than one type of medicine. Take the medicine exactly as directed. Diuretics  help decrease extra fluid that collects in your body. This will help lower your BP. You may urinate more often while you take this medicine. Cholesterol medicine  helps lower your cholesterol level. A low cholesterol level helps prevent heart disease and makes it easier to control your BP.     Take your medicine as directed. Contact your healthcare provider if you think your medicine is not helping or if you have side effects. Tell your provider if you are allergic to any medicine. Keep a list of the medicines, vitamins, and herbs you take. Include the amounts, and when and why you take them. Bring the list or the pill bottles to follow-up visits. Carry your medicine list with you in case of an emergency. Manage hypertension and diabetes:  Talk with your healthcare provider about these and other ways to manage hypertension and diabetes:  Check your BP at home. Do not smoke, drink caffeine, or exercise at least 30 minutes before checking your BP. Sit and rest for 5 minutes before you take your blood pressure. Extend your arm and support it on a flat surface. Your arm should be at the same level as your heart. Follow the directions that came with your BP monitor. Check your BP 2 times, 1 minute apart, before you take your medicine in the morning. Also check your BP before your evening meal. Keep a record of your readings and bring it to your follow-up visits. Ask your provider what your BP should be. Check your blood sugar level at home. Follow your provider's instructions and check your blood sugar level as directed. You may need to check a drop of blood in a glucose test machine. Your care team provider may recommend a continuous glucose monitor (CGM). A CGM is a device that is worn at all times. The CGM checks your blood sugar every 5 minutes. It sends results to an electronic device such as a smart phone. Keep a record of your blood sugar level readings and bring it to your follow-up visits. Ask your provider what your blood sugar levels should be. Manage any other health conditions you have. Health conditions such as kidney disease, thyroid disease, or adrenal gland disorder can increase your BP and blood sugar levels.  Follow your provider's instructions and take all your medicines as directed. Lifestyle changes you can make:  Talk with your healthcare provider about these and other lifestyle changes for hypertension and diabetes:  Limit sodium (salt) as directed. Too much sodium can affect your fluid balance. Check labels to find low-sodium or no-salt-added foods. Some low-sodium foods use potassium salts for flavor. Too much potassium can also cause health problems. Your provider will tell you how much sodium and potassium are safe for you to have in a day. He or she may recommend that you limit sodium to 2,300 mg a day. Follow the meal plan recommended by your provider. A dietitian or your provider can help you create healthy meal plans. The plans will help you control sodium, carbohydrates, and fats in your meals. This can help you control both your blood sugar and BP levels. The plans usually include eating more fruits, vegetables, and low-fat dairy products. Your provider may talk to you about a Mediterranean style and Dietary Approaches to Stop Hypertension (DASH) eating plans. These eating plans can help you with weight loss and lowering your cholesterol. Get regular physical activity. Physical activity can help decrease your blood sugar level. It can also help to decrease your risk for heart disease and help you maintain a healthy weight. Adults should have moderate intensity physical activity for at least 150 minutes every week. Spread the amount of activity over at least 3 days a week. Do not skip more than 2 days in a row. Children should get at least 60 minutes of moderate physical activity on most days of the week. Examples of moderate physical activity include brisk walking, running, and swimming. Do not sit for longer than 30 minutes. Work with your provider to create a plan for physical activity. Decrease stress. This may help lower your BP. Learn ways to relax, such as deep breathing or listening to music. Yoga and meditation may also help.  Talk to your provider about ways to decrease stress. Limit alcohol as directed. Alcohol can cause your blood sugar levels to be low if you use insulin. Alcohol can cause high blood sugar and BP levels, and weight gain. Women 21 years or older and men 72 years or older should limit alcohol to 1 drink a day. Men aged 24 to 59 years should limit alcohol to 2 drinks a day. A drink of alcohol is 12 ounces of beer, 5 ounces of wine, or 1½ ounces of liquor. Do not smoke. Nicotine and other chemicals in cigarettes and cigars can increase your BP and make your blood sugar levels harder to control. Ask your provider for information if you currently smoke and need help to quit. E-cigarettes or smokeless tobacco still contain nicotine. Talk to your provider before you use these products. Follow up with your doctor or diabetes care team provider as directed: You will need to return to have your BP checked. You will also need other lab tests done, including an A1C to monitor your overall blood sugar control. Write down your questions so you remember to ask them during your visits. © Copyright [employer] 2023 Information is for End User's use only and may not be sold, redistributed or otherwise used for commercial purposes. The above information is an  only. It is not intended as medical advice for individual conditions or treatments. Talk to your doctor, nurse or pharmacist before following any medical regimen to see if it is safe and effective for you.

## 2023-10-18 NOTE — PROGRESS NOTES
Dr. Sharma Bio Office Visit Note  10/18/23     Taye Lee 62 y.o. male MRN: [id number]  : 1965    Assessment:     1. Lumbar disc herniation  Assessment & Plan:  No change Percocet 10/325 4 times a day was refilled today remaining follow-up plan finding same as follows: Percocet 10-3 25 4 times a day was refilled this regimen helping patient to work full-time and carry out day-to-day activities            All medical records reviewed and reconciled patient [facility] reviewed to ensure compliant with the treatment plan for pain management benefits due to the fact that patient has not responded to other measures for pain control so far and severe the pain is significant and interfering with normal daily activities I believe it is reasonable and appropriate to continue the controlled substance in order to improve his ability to function in enhance quality of life the patient has signed a narcotic agreement patient should it was utilize 1 pharmacy and not receiving narcotic from any other provider patient verbalizes understanding that breaching the contract will affect future prescription decision making and critical results in the dismissal from the practice if specifically the has been explained in understood to patient patient demonstrates following informed use of appropriate medicine to analgesia increase activity explained the side effects recommended that thisto the patient patient understands that also advised that appeared to expose him order an abuse and addiction and overdose counseling provided for prevention of any overuse abuse or addiction    Orders:  -     oxyCODONE-acetaminophen (PERCOCET)  mg per tablet; Take 1 tablet by mouth every 8 (eight) hours as needed for severe pain Max Daily Amount: 3 tablets    2. Benign essential hypertension  Assessment & Plan:  Continue low-salt diet and Maxide and Soni blood pressure controlled monitoring blood pressure very well controlled      3.  Primary osteoarthritis of both knees  Assessment & Plan:  Continue Celebrex seems to be controlled symptoms underwent chair cortisone injection some relief    Seen by orthopedist needed total knee replacement bilateral      4. Intermittent claudication due to atherosclerosis of artery of extremity (HCC)  Assessment & Plan:  Still awaiting lower extremity arterial duplex until then continue on blood pressure LDL and baby aspirin      5. DDD (degenerative disc disease), lumbar  Assessment & Plan:  Prescribed Percocet 10/325 3 times a day advised to be seen by pain management    Orders:  -     oxyCODONE-acetaminophen (PERCOCET)  mg per tablet; Take 1 tablet by mouth every 8 (eight) hours as needed for severe pain Max Daily Amount: 3 tablets    6. Lumbar foraminal stenosis  Assessment & Plan:  Refill Percocet 10-3 25 4 times a day as needed remaining follow-up plan finding same as under lumbar disc herniation overall unchanged    Orders:  -     oxyCODONE-acetaminophen (PERCOCET)  mg per tablet; Take 1 tablet by mouth every 8 (eight) hours as needed for severe pain Max Daily Amount: 3 tablets          Discussion Summary and Plan: Today's care plan and medications were reviewed with patient in detail and all their questions answered to their satisfaction.     Chief Complaint   Patient presents with   • Follow-up      Subjective:  Patient came in follow-up chronic medical condition listed under visit diagnosis still awaiting lower EXTR arterial duplex although patient claims symptoms of intermittent claudication has improved also refill for Percocet 10-3 25 4 times a day this regimen helping patient to function carry out day-to-day activities and work full-time also pain contract renewed drug screen done results pending and also patient claims his itching erythematous maculopapular very tiny rash all over the trunk and extremity has come in contact with some plants does not remember the detail treated with Medrol Dosepak Benadryl and Zyrtec the previous  Above reviewed patient's rash completely resolved still awaiting lower extremity arterial duplex Percocet 10-3 25 4 times a day was refilled this regimen helping patient work full-time and carry out day-to-day activities        The following portions of the patient's history were reviewed and updated as appropriate: allergies, current medications, past family history, past medical history, past social history, past surgical history and problem list.    Review of Systems   Constitutional:  Negative for activity change, appetite change, chills, diaphoresis, fatigue, fever and unexpected weight change. HENT:  Negative for congestion, dental problem, drooling, ear discharge, ear pain, facial swelling, hearing loss, mouth sores, nosebleeds, postnasal drip, rhinorrhea, sinus pressure, sneezing, sore throat, tinnitus, trouble swallowing and voice change. Eyes:  Negative for photophobia, pain, discharge, redness, itching and visual disturbance. Respiratory:  Negative for apnea, cough, choking, chest tightness, shortness of breath, wheezing and stridor. Cardiovascular:  Negative for chest pain, palpitations and leg swelling. Gastrointestinal:  Negative for abdominal distention, abdominal pain, anal bleeding, blood in stool, constipation, diarrhea, nausea, rectal pain and vomiting. Endocrine: Negative for cold intolerance, heat intolerance, polydipsia, polyphagia and polyuria. Genitourinary:  Negative for decreased urine volume, difficulty urinating, dysuria, enuresis, flank pain, frequency, genital sores, hematuria and urgency. Musculoskeletal:  Positive for arthralgias, back pain, gait problem, joint swelling and myalgias. Negative for neck pain and neck stiffness. Skin:  Negative for color change, pallor, rash and wound. Allergic/Immunologic: Negative. Negative for environmental allergies, food allergies and immunocompromised state.    Neurological:  Negative for dizziness, tremors, seizures, syncope, facial asymmetry, speech difficulty, weakness, light-headedness, numbness and headaches. Psychiatric/Behavioral:  Negative for agitation, behavioral problems, confusion, decreased concentration, dysphoric mood, hallucinations, self-injury, sleep disturbance and suicidal ideas. The patient is not nervous/anxious and is not hyperactive. Historical Information     Patient Active Problem List   Diagnosis   • Benign essential hypertension   • Benign prostatic hyperplasia   • DDD (degenerative disc disease), lumbar   • Incomplete emptying of bladder   • Lumbar disc herniation   • Lumbar foraminal stenosis   • Neuropathy   • Chronic intractable pain   • Mild episode of recurrent major depressive disorder (HCC)   • Hematospermia   • Prostatitis   • Other male erectile dysfunction   • Annual physical exam   • Primary osteoarthritis of both knees   • Chronic cough   • Non-seasonal allergic rhinitis due to pollen   • Continuous opioid dependence ([facility])   • Pain   • Swelling   • Intermittent claudication due to atherosclerosis of artery of extremity ([facility])   • Lumbar spondylosis   • Gastric bypass status for obesity   • Morbid obesity due to excess calories ([facility])   • Uncomplicated opioid dependence ([address])   • Allergic reaction     Past Medical History:   Diagnosis Date   • Abdominal pain    • Anxiety disorder    • Arthritis    • Back pain    • Dizziness    • Headache    • Hypertension    • Lightheadedness    • Obesity    • Palpitations    • SOB (shortness of breath)      Past Surgical History:   Procedure Laterality Date   • CHOLECYSTECTOMY     • GASTRIC BYPASS       Social History     Substance and Sexual Activity   Alcohol Use None    Comment: none per Christie     Social History     Substance and Sexual Activity   Drug Use Not on file    Comment: none per Christie     Social History     Tobacco Use   Smoking Status Never   Smokeless Tobacco Never     History reviewed.  No pertinent family history.   Health Maintenance Due   Topic   • Hepatitis C Screening    • HIV Screening    • COVID-19 Vaccine (6 - Moderna series)   • Influenza Vaccine (1)   • Annual Physical       Meds/Allergies       Current Outpatient Medications:   •  amlodipine-olmesartan (Soni) 5-40 MG, Take 1 tablet by mouth daily, Disp: 90 tablet, Rfl: 2  •  celecoxib (CeleBREX) 200 mg capsule, Take 1 capsule (200 mg total) by mouth daily, Disp: 90 capsule, Rfl: 2  •  cetirizine (ZyrTEC) 10 mg tablet, Take 1 tablet (10 mg total) by mouth daily, Disp: 30 tablet, Rfl: 2  •  escitalopram (LEXAPRO) 20 mg tablet, Take 1 tablet (20 mg total) by mouth daily, Disp: 90 tablet, Rfl: 0  •  fluticasone (FLONASE) 50 mcg/act nasal spray, 1 spray into each nostril daily, Disp: 18.2 mL, Rfl: 2  •  oxyCODONE-acetaminophen (PERCOCET)  mg per tablet, Take 1 tablet by mouth every 8 (eight) hours as needed for severe pain Max Daily Amount: 3 tablets, Disp: 90 tablet, Rfl: 0  •  pramipexole (MIRAPEX) 0.5 mg tablet, Take 1 tablet (0.5 mg total) by mouth daily at bedtime, Disp: 30 tablet, Rfl: 5  •  pregabalin (LYRICA) 100 mg capsule, Take 1 capsule (100 mg total) by mouth 2 (two) times a day, Disp: 180 capsule, Rfl: 0  •  sildenafil (Viagra) 100 mg tablet, Take 1 tablet (100 mg total) by mouth daily as needed for erectile dysfunction, Disp: 10 tablet, Rfl: 55  •  tamsulosin (FLOMAX) 0.4 mg, Take 1 capsule (0.4 mg total) by mouth daily with dinner, Disp: 90 capsule, Rfl: 2  •  triamterene-hydrochlorothiazide (MAXZIDE-25) 37.5-25 mg per tablet, Take 1 tablet by mouth daily, Disp: 90 tablet, Rfl: 2  •  methylPREDNISolone 4 MG tablet therapy pack, Use as directed on package (Patient not taking: Reported on 10/18/2023), Disp: 21 each, Rfl: 0  •  neomycin-polymyxin-hydrocortisone (CORTISPORIN) 0.35%-10,000 units/mL-1% otic suspension, Administer 3 drops into the left ear 4 (four) times a day for 7 days, Disp: 10 mL, Rfl: 0      Objective:    Vitals:   /78   Pulse 80   Temp 98 °F (36.7 °C)   Ht 5' 11" (1.803 m)   Wt 107 kg (235 lb 3.2 oz)   SpO2 97%   BMI 32.80 kg/m²   Body mass index is 32.8 kg/m². Vitals:    10/18/23 0959   Weight: 107 kg (235 lb 3.2 oz)       Physical Exam  Vitals and nursing note reviewed. Constitutional:       General: He is not in acute distress. Appearance: He is well-developed. He is not ill-appearing, toxic-appearing or diaphoretic. HENT:      Head: Normocephalic and atraumatic. Right Ear: External ear normal.      Left Ear: External ear normal.      Nose: Nose normal.      Mouth/Throat:      Pharynx: No oropharyngeal exudate. Eyes:      General: Lids are normal. Lids are everted, no foreign bodies appreciated. No scleral icterus. Right eye: No discharge. Left eye: No discharge. Conjunctiva/sclera: Conjunctivae normal.      Pupils: Pupils are equal, round, and reactive to light. Neck:      Thyroid: No thyromegaly. Vascular: Normal carotid pulses. No carotid bruit, hepatojugular reflux or JVD. Trachea: No tracheal tenderness or tracheal deviation. Cardiovascular:      Rate and Rhythm: Normal rate and regular rhythm. Pulses: Normal pulses. Heart sounds: Normal heart sounds. No murmur heard. No friction rub. No gallop. Pulmonary:      Effort: Pulmonary effort is normal. No respiratory distress. Breath sounds: Normal breath sounds. No stridor. No wheezing or rales. Chest:      Chest wall: No tenderness. Abdominal:      General: Bowel sounds are normal. There is no distension. Palpations: Abdomen is soft. There is no mass. Tenderness: There is no abdominal tenderness. There is no guarding or rebound. Musculoskeletal:         General: Deformity present. No tenderness. Normal range of motion. Cervical back: Normal range of motion and neck supple. No edema, erythema or rigidity.  No spinous process tenderness or muscular tenderness. Normal range of motion. Lymphadenopathy:      Head:      Right side of head: No submental, submandibular, tonsillar, preauricular or posterior auricular adenopathy. Left side of head: No submental, submandibular, tonsillar, preauricular, posterior auricular or occipital adenopathy. Cervical: No cervical adenopathy. Right cervical: No superficial, deep or posterior cervical adenopathy. Left cervical: No superficial, deep or posterior cervical adenopathy. Upper Body:      Right upper body: No pectoral adenopathy. Left upper body: No pectoral adenopathy. Skin:     General: Skin is warm and dry. Coloration: Skin is not pale. Findings: No erythema or rash. Neurological:      General: No focal deficit present. Mental Status: He is alert and oriented to person, place, and time. Cranial Nerves: No cranial nerve deficit. Sensory: No sensory deficit. Motor: No tremor, abnormal muscle tone or seizure activity. Coordination: Coordination normal.      Gait: Gait normal.      Deep Tendon Reflexes: Reflexes are normal and symmetric. Reflexes normal.   Psychiatric:         Behavior: Behavior normal.         Thought Content: Thought content normal.         Judgment: Judgment normal.         Lab Review   Office Visit on 09/20/2023   Component Date Value Ref Range Status   • RESULT ALL_PRES MEDS SP INSTRNS 70/10/1011 Not Applicable   Final    Comment:  4-ANPP: Fentanyl Negative. Acetyl fentanyl: Fentanyl Negative. Acetyl norfentan  yl: Fentanyl Negative. Acryl fentanyl: Fentanyl Negative. Carfentanil: Fentanyl   Negative. Para-fluorofentanyl: Fentanyl Negative.      • Amphetamine Quantification 09/20/2023 negative  100 ng/mL Final   • Methamphetamine Quantification 09/20/2023 negative  100 ng/mL Final   • Butalbital Quantification 09/20/2023 negative  200 ng/mL Final   • Phenobarbital Quantification 09/20/2023 negative  200 ng/mL Final   • Secobarbital Quantification 09/20/2023 negative  200 ng/mL Final   • Alpha-Hydroxyalprazolam Quantifica* 09/20/2023 positive-573.198-I (A)  20 ng/mL Final   • 7-Amino-Clonazepam Quantification * 09/20/2023 negative  20 ng/mL Final   • Nordiazepam Quantification 09/20/2023 negative  40 ng/mL Final   • Temazepam Quantification 09/20/2023 negative  50 ng/mL Final   • Oxazepam Quantification 09/20/2023 negative  40 ng/mL Final   • Lorazepam Quantification 09/20/2023 negative  40 ng/mL Final   • Gabapentin Quantification 09/20/2023 negative  1000 Final   • PREGABALIN QUANTIFICATION 09/20/2023 negative-I (A)  400 Final   • Cocaine metabolite Quantification 09/20/2023 negative  50 ng/mL Final   • 6-LEEANN (Heroin metabolite) Quantifi* 09/20/2023 negative  10 ng/mL Final   • Buprenorphine Quantification 09/20/2023 negative  5 ng/mL Final   • Norbuprenorphine Quantification 09/20/2023 negative  20 ng/mL Final   • Dextrorphan (Dextromethorphan meta* 09/20/2023 negative  50 ng/mL Final   • Meperidine Quantification 09/20/2023 negative  50 ng/mL Final   • Normeperidine Quantification 09/20/2023 negative  50 ng/mL Final   • Naltrexone Quantification 09/20/2023 negative  10 ng/mL Final   • NALTREXOL (NALTREXONE METABOLITE) * 09/20/2023 negative  10 ng/mL Final   • Fentanyl Quantification 09/20/2023 negative  1 ng/mL Final   • Norfentanyl Quantification 09/20/2023 negative  8 ng/mL Final   • 4-ANPP Quantification 09/20/2023 Fen Neg  2 ng/mL Final   • Acetyl fentanyl Quantification 09/20/2023 Fen Neg  2 ng/mL Final   • Acetyl norfentanyl Quantification 09/20/2023 Fen Neg  5 ng/mL Final   • Acryl fentanyl Quantification 09/20/2023 Fen Neg  1 ng/mL Final   • Carfentanil Quantification 09/20/2023 Fen Neg  2 ng/mL Final   • Para-fluorofentanyl Quantification 09/20/2023 Fen Neg  1 ng/mL Final   • Methadone Quantification 09/20/2023 negative  100 ng/mL Final   • EDDP (Methadone metabolite) Quanti* 09/20/2023 negative  100 ng/mL Final   • Oxycodone Quantification 09/20/2023 positive-4612.905  50 ng/mL Final   • Noroxycodone Quantification 09/20/2023 positive-5244.238  50 ng/mL Final   • Oxymorphone Quantification 09/20/2023 positive-6534.695  50 ng/mL Final   • Tapentadol Quantification 09/20/2023 negative  50 ng/mL Final   • cTHC (Marijuana metabolite) Quanti* 09/20/2023 negative  15 ng/mL Final   • Tramadol Quantification 09/20/2023 negative  100 ng/mL Final   • O-desmethyl-tramadol Quantification 09/20/2023 negative  100 ng/mL Final   • N-DESMETHYL-TRAMADOL QUANTIFICATION 09/20/2023 negative  100 ng/mL Final   • Codeine Quantification 09/20/2023 negative  50 ng/mL Final   • Morphine Quantification 09/20/2023 negative  50 ng/mL Final   • Hydrocodone Quantification 09/20/2023 negative  50 ng/mL Final   • Norhydrocodone Quantification 09/20/2023 negative  50 ng/mL Final   • Hydromorphone Quantification 09/20/2023 negative  50 ng/mL Final   • EUTYLONE QUANTIFICATION 09/20/2023 negative  10 ng/mL Final   • METHYLONE QUANTIFICATION 09/20/2023 negative  3 ng/mL Final   • Ethyl Glucuronide Quantification 09/20/2023 negative  500 ng/mL Final   • Ethyl Sulfate Quantification 09/20/2023 negative  500 ng/mL Final   • Mitragynine (Kratom alkaloid) Jemal* 09/20/2023 negative  1 ng/mL Final   • 2-QQ-Bsmnazrvfuh (Kratom alkaloid)* 09/20/2023 negative  1 ng/mL Final   • 5F-ADB-M7 09/20/2023 negative  10 ng/mL Final   • NE-JCOYYRRS-W8 METABOLITE QUANTIFI* 09/20/2023 negative  10 ng/mL Final   • ZDHV-EJHJHVHP-Q1 METABOLITE QUANTI* 09/20/2023 negative  10 ng/mL Final   • OQV028 metabolite Quantification 09/20/2023 negative  10 ng/mL Final   • TDK515 metabolite Quantification 09/20/2023 negative  10 ng/mL Final   • RCS4 METABOLITE QUANTIFICATION 09/20/2023 negative  10 ng/mL Final   • UFX54/ METABOLITE QUANTIFICAT* 09/20/2023 negative  10 ng/mL Final   • Methylphenidate Quantification 09/20/2023 negative  50 ng/mL Final   • RITALINIC ACID QUANTIFICATION 09/20/2023 negative  50 ng/mL Final   • PHENTERMINE QUANTIFICATION 09/20/2023 negative  50 ng/mL Final   • OXIDANT 09/20/2023 normal-0  <200 ug/mL ug/mL Final   • CREATININE 09/20/2023 normal-138.1  >20 mg/dL mg/dL Final   • pH 09/20/2023 normal-5.5  4.5 - 9.5 Final   • SPECIFIC GRAVITY URINE 09/20/2023 normal-1.015  1.003 - 1.035 Final         Patient Instructions   Hypertension and Diabetes   WHAT YOU NEED TO KNOW:   Hypertension is high blood pressure (BP). Hypertension is common in persons with diabetes. A normal BP is 119/79 or lower. You can control hypertension and diabetes with a healthy lifestyle, or a combination of lifestyle and medicine. Controlled BP and blood sugar levels help prevent certain complications from diabetes. Examples include retinopathy (eye damage) and kidney damage. DISCHARGE INSTRUCTIONS:   Call or have someone call your local emergency number (911 in the [address]) for any of the following: You have any of the following signs of a heart attack:   Squeezing, pressure, or pain in your chest    You may  also have any of the following:     Discomfort or pain in your back, neck, jaw, stomach, or arm    Shortness of breath    Nausea or vomiting    Lightheadedness or a sudden cold sweat  You have any of the following signs of a stroke:   Numbness or drooping on one side of your face     Weakness in an arm or leg    Confusion or difficulty speaking    Dizziness, a severe headache, or vision loss    Seek immediate care if:   You feel faint, dizzy, confused, or drowsy. You have a severe headache or vision loss. Call your doctor or diabetes care team provider if:   You have been taking your BP medicine and your BP is still higher than your healthcare provider says it should be. You have questions or concerns about your condition or care. Medicines: You may  need any of the following:  Medicine  may be used to help lower your BP. You may need more than one type of medicine.  Take the medicine exactly as directed. Diuretics  help decrease extra fluid that collects in your body. This will help lower your BP. You may urinate more often while you take this medicine. Cholesterol medicine  helps lower your cholesterol level. A low cholesterol level helps prevent heart disease and makes it easier to control your BP. Take your medicine as directed. Contact your healthcare provider if you think your medicine is not helping or if you have side effects. Tell your provider if you are allergic to any medicine. Keep a list of the medicines, vitamins, and herbs you take. Include the amounts, and when and why you take them. Bring the list or the pill bottles to follow-up visits. Carry your medicine list with you in case of an emergency. Manage hypertension and diabetes:  Talk with your healthcare provider about these and other ways to manage hypertension and diabetes:  Check your BP at home. Do not smoke, drink caffeine, or exercise at least 30 minutes before checking your BP. Sit and rest for 5 minutes before you take your blood pressure. Extend your arm and support it on a flat surface. Your arm should be at the same level as your heart. Follow the directions that came with your BP monitor. Check your BP 2 times, 1 minute apart, before you take your medicine in the morning. Also check your BP before your evening meal. Keep a record of your readings and bring it to your follow-up visits. Ask your provider what your BP should be. Check your blood sugar level at home. Follow your provider's instructions and check your blood sugar level as directed. You may need to check a drop of blood in a glucose test machine. Your care team provider may recommend a continuous glucose monitor (CGM). A CGM is a device that is worn at all times. The CGM checks your blood sugar every 5 minutes. It sends results to an electronic device such as a smart phone.  Keep a record of your blood sugar level readings and bring it to your follow-up visits. Ask your provider what your blood sugar levels should be. Manage any other health conditions you have. Health conditions such as kidney disease, thyroid disease, or adrenal gland disorder can increase your BP and blood sugar levels. Follow your provider's instructions and take all your medicines as directed. Lifestyle changes you can make:  Talk with your healthcare provider about these and other lifestyle changes for hypertension and diabetes:  Limit sodium (salt) as directed. Too much sodium can affect your fluid balance. Check labels to find low-sodium or no-salt-added foods. Some low-sodium foods use potassium salts for flavor. Too much potassium can also cause health problems. Your provider will tell you how much sodium and potassium are safe for you to have in a day. He or she may recommend that you limit sodium to 2,300 mg a day. Follow the meal plan recommended by your provider. A dietitian or your provider can help you create healthy meal plans. The plans will help you control sodium, carbohydrates, and fats in your meals. This can help you control both your blood sugar and BP levels. The plans usually include eating more fruits, vegetables, and low-fat dairy products. Your provider may talk to you about a Mediterranean style and Dietary Approaches to Stop Hypertension (DASH) eating plans. These eating plans can help you with weight loss and lowering your cholesterol. Get regular physical activity. Physical activity can help decrease your blood sugar level. It can also help to decrease your risk for heart disease and help you maintain a healthy weight. Adults should have moderate intensity physical activity for at least 150 minutes every week. Spread the amount of activity over at least 3 days a week. Do not skip more than 2 days in a row. Children should get at least 60 minutes of moderate physical activity on most days of the week.  Examples of moderate physical activity include brisk walking, running, and swimming. Do not sit for longer than 30 minutes. Work with your provider to create a plan for physical activity. Decrease stress. This may help lower your BP. Learn ways to relax, such as deep breathing or listening to music. Yoga and meditation may also help. Talk to your provider about ways to decrease stress. Limit alcohol as directed. Alcohol can cause your blood sugar levels to be low if you use insulin. Alcohol can cause high blood sugar and BP levels, and weight gain. Women 21 years or older and men 72 years or older should limit alcohol to 1 drink a day. Men aged 24 to 59 years should limit alcohol to 2 drinks a day. A drink of alcohol is 12 ounces of beer, 5 ounces of wine, or 1½ ounces of liquor. Do not smoke. Nicotine and other chemicals in cigarettes and cigars can increase your BP and make your blood sugar levels harder to control. Ask your provider for information if you currently smoke and need help to quit. E-cigarettes or smokeless tobacco still contain nicotine. Talk to your provider before you use these products. Follow up with your doctor or diabetes care team provider as directed: You will need to return to have your BP checked. You will also need other lab tests done, including an A1C to monitor your overall blood sugar control. Write down your questions so you remember to ask them during your visits. © Copyright [facility] 2023 Information is for End User's use only and may not be sold, redistributed or otherwise used for commercial purposes. The above information is an  only. It is not intended as medical advice for individual conditions or treatments. Talk to your doctor, nurse or pharmacist before following any medical regimen to see if it is safe and effective for you. Adrianna Bliss MD        "This note has been constructed using a voice recognition system. Therefore there may be syntax, spelling, and/or grammatical errors.  Please call if you have any questions. "

## 2023-10-18 NOTE — ASSESSMENT & PLAN NOTE
Continue Celebrex seems to be controlled symptoms underwent chair cortisone injection some relief    Seen by orthopedist needed total knee replacement bilateral

## 2023-10-30 ENCOUNTER — TELEPHONE (OUTPATIENT)
Age: 58
End: 2023-10-30

## 2023-10-30 DIAGNOSIS — N39.0 URINARY TRACT INFECTION WITHOUT HEMATURIA, SITE UNSPECIFIED: Primary | ICD-10-CM

## 2023-10-30 RX ORDER — CIPROFLOXACIN 500 MG/1
500 TABLET, FILM COATED ORAL EVERY 12 HOURS SCHEDULED
Qty: 14 TABLET | Refills: 0 | Status: SHIPPED | OUTPATIENT
Start: 2023-10-30 | End: 2023-11-06

## 2023-10-30 NOTE — TELEPHONE ENCOUNTER
Patient called for the last two days he has had frequency with urination a sharp pain in his bladder   he has pain on  his sides and in his abdomen   he wants an antibiotic called in until he can see the urologist till the 8th   he uses the [facility]
Statement Selected

## 2023-11-01 DIAGNOSIS — N40.0 BENIGN PROSTATIC HYPERPLASIA, UNSPECIFIED WHETHER LOWER URINARY TRACT SYMPTOMS PRESENT: ICD-10-CM

## 2023-11-01 DIAGNOSIS — I10 BENIGN ESSENTIAL HYPERTENSION: ICD-10-CM

## 2023-11-01 RX ORDER — AMLODIPINE AND OLMESARTAN MEDOXOMIL 5; 40 MG/1; MG/1
1 TABLET ORAL DAILY
Qty: 90 TABLET | Refills: 2 | Status: SHIPPED | OUTPATIENT
Start: 2023-11-01

## 2023-11-01 RX ORDER — TAMSULOSIN HYDROCHLORIDE 0.4 MG/1
0.4 CAPSULE ORAL
Qty: 90 CAPSULE | Refills: 2 | Status: SHIPPED | OUTPATIENT
Start: 2023-11-01

## 2023-11-01 RX ORDER — CELECOXIB 200 MG/1
200 CAPSULE ORAL DAILY
Qty: 90 CAPSULE | Refills: 2 | Status: SHIPPED | OUTPATIENT
Start: 2023-11-01

## 2023-11-16 ENCOUNTER — OFFICE VISIT (OUTPATIENT)
Dept: INTERNAL MEDICINE CLINIC | Facility: CLINIC | Age: 58
End: 2023-11-16
Payer: COMMERCIAL

## 2023-11-16 VITALS
OXYGEN SATURATION: 96 % | WEIGHT: 240 LBS | HEART RATE: 81 BPM | BODY MASS INDEX: 33.6 KG/M2 | HEIGHT: 71 IN | DIASTOLIC BLOOD PRESSURE: 80 MMHG | SYSTOLIC BLOOD PRESSURE: 118 MMHG

## 2023-11-16 DIAGNOSIS — R73.03 PREDIABETES: ICD-10-CM

## 2023-11-16 DIAGNOSIS — N41.0 ACUTE PROSTATITIS: Primary | ICD-10-CM

## 2023-11-16 DIAGNOSIS — I10 BENIGN ESSENTIAL HYPERTENSION: ICD-10-CM

## 2023-11-16 DIAGNOSIS — N40.1 BENIGN PROSTATIC HYPERPLASIA WITH URINARY FREQUENCY: ICD-10-CM

## 2023-11-16 DIAGNOSIS — Z98.84 GASTRIC BYPASS STATUS FOR OBESITY: ICD-10-CM

## 2023-11-16 DIAGNOSIS — M48.061 LUMBAR FORAMINAL STENOSIS: ICD-10-CM

## 2023-11-16 DIAGNOSIS — Z12.5 SCREENING PSA (PROSTATE SPECIFIC ANTIGEN): ICD-10-CM

## 2023-11-16 DIAGNOSIS — M51.36 DDD (DEGENERATIVE DISC DISEASE), LUMBAR: ICD-10-CM

## 2023-11-16 DIAGNOSIS — R35.0 BENIGN PROSTATIC HYPERPLASIA WITH URINARY FREQUENCY: ICD-10-CM

## 2023-11-16 DIAGNOSIS — M51.26 LUMBAR DISC HERNIATION: ICD-10-CM

## 2023-11-16 DIAGNOSIS — F11.20 CONTINUOUS OPIOID DEPENDENCE (HCC): ICD-10-CM

## 2023-11-16 PROCEDURE — 99214 OFFICE O/P EST MOD 30 MIN: CPT | Performed by: INTERNAL MEDICINE

## 2023-11-16 RX ORDER — OXYCODONE AND ACETAMINOPHEN 10; 325 MG/1; MG/1
1 TABLET ORAL EVERY 8 HOURS PRN
Qty: 90 TABLET | Refills: 0 | Status: SHIPPED | OUTPATIENT
Start: 2023-11-16

## 2023-11-16 RX ORDER — CIPROFLOXACIN 500 MG/1
500 TABLET, FILM COATED ORAL EVERY 12 HOURS SCHEDULED
Qty: 60 TABLET | Refills: 0 | Status: SHIPPED | OUTPATIENT
Start: 2023-11-16 | End: 2023-12-16

## 2023-11-16 NOTE — ASSESSMENT & PLAN NOTE
Complains of frequent urination at nighttime in the daytime seen by urologist awaiting cystoscopy we will check PSA level continue Flomax we will get UA CNS done start Cipro because of the burning

## 2023-11-16 NOTE — ASSESSMENT & PLAN NOTE
Refill Percocet 10-3 25 4 times a day as needed remaining follow-up plan finding same as under lumbar disc herniation overall unchanged    All medical records reviewed and reconciled patient PDMP database reviewed to ensure compliant with the treatment plan for pain management benefits due to the fact that patient has not responded to other measures for pain control so far and severe the pain is significant and interfering with normal daily activities I believe it is reasonable and appropriate to continue the controlled substance in order to improve his ability to function in enhance quality of life the patient has signed a narcotic agreement patient should it was utilize 1 pharmacy and not receiving narcotic from any other provider patient verbalizes understanding that breaching the contract will affect future prescription decision making and critical results in the dismissal from the practice if specifically the has been explained in understood to patient patient demonstrates following informed use of appropriate medicine to analgesia increase activity explained the side effects recommended that thisto the patient patient understands that also advised that appeared to expose him order an abuse and addiction and overdose counseling provided for prevention of any overuse abuse or addiction

## 2023-11-16 NOTE — ASSESSMENT & PLAN NOTE
Patient was seen by urologist and the urology follow-up notes not available yet requested although patient verbally told me diagnosis prostatitis not on antibiotic awaiting cystoscopy after week for frequency of urination and burning we will get urine analysis urine culture done and then start Cipro 500 twice a day and follow-up with the urology

## 2023-11-16 NOTE — ASSESSMENT & PLAN NOTE
Percocet 10-3 25 4 times a day was refilled this regimen helping patient carry out day-to-day activities function remaining plan counseling follow-up as follows from a previous progress note            All medical records reviewed and reconciled patient PDMP database reviewed to ensure compliant with the treatment plan for pain management benefits due to the fact that patient has not responded to other measures for pain control so far and severe the pain is significant and interfering with normal daily activities I believe it is reasonable and appropriate to continue the controlled substance in order to improve his ability to function in enhance quality of life the patient has signed a narcotic agreement patient should it was utilize 1 pharmacy and not receiving narcotic from any other provider patient verbalizes understanding that breaching the contract will affect future prescription decision making and critical results in the dismissal from the practice if specifically the has been explained in understood to patient patient demonstrates following informed use of appropriate medicine to analgesia increase activity explained the side effects recommended that thisto the patient patient understands that also advised that appeared to expose him order an abuse and addiction and overdose counseling provided for prevention of any overuse abuse or addiction

## 2023-11-16 NOTE — ASSESSMENT & PLAN NOTE
The pain contract renewed drug screen reviewed no evidence of any overuse abuse addiction  The follow-up plan finding counseling as follows from a previous progress note  All medical records reviewed and reconciled patient [address] reviewed to ensure compliant with the treatment plan for pain management benefits due to the fact that patient has not responded to other measures for pain control so far and severe the pain is significant and interfering with normal daily activities I believe it is reasonable and appropriate to continue the controlled substance in order to improve his ability to function in enhance quality of life the patient has signed a narcotic agreement patient should it was utilize 1 pharmacy and not receiving narcotic from any other provider patient verbalizes understanding that breaching the contract will affect future prescription decision making and critical results in the dismissal from the practice if specifically the has been explained in understood to patient patient demonstrates following informed use of appropriate medicine to analgesia increase activity explained the side effects recommended that thisto the patient patient understands that also advised that appeared to expose him order an abuse and addiction and overdose counseling provided for prevention of any overuse abuse or addiction

## 2023-11-16 NOTE — ASSESSMENT & PLAN NOTE
Continue low-salt diet and Maxide and Soni blood pressure controlled monitoring blood pressure very well controlled continue monitoring blood pressure at home

## 2023-11-16 NOTE — ASSESSMENT & PLAN NOTE
omponent  Ref Range & Units 3/26/22  9:10 AM 5/22/21  7:07 AM 3/7/20  9:11 AM 2/10/18 10:06 AM   Hemoglobin A1C  <5.7 % 5.7 High       Fasting blood sugar was 117 awaiting repeat A1c and fasting blood sugar advised diabetic diet last A1c reviewed BMP reviewed

## 2023-11-16 NOTE — ASSESSMENT & PLAN NOTE
Gastric bypass done about 27 years ago now BMI 30 she cannot lose weight with diet exerc at length BMI 33.47 counseling done to lose weight very low calorie diet exercise change in lifestyle patient was interested Wegovy injection could not start because of the coverage issue of

## 2023-11-16 NOTE — PROGRESS NOTES
Dr. Meryl Cheadle Office Visit Note  23     Adriana Alexander 62 y.o. male MRN: [id number]  : 1965    Assessment:     1. Acute prostatitis  Assessment & Plan:  Patient was seen by urologist and the urology follow-up notes not available yet requested although patient verbally told me diagnosis prostatitis not on antibiotic awaiting cystoscopy after week for frequency of urination and burning we will get urine analysis urine culture done and then start Cipro 500 twice a day and follow-up with the urology    Orders:  -     ciprofloxacin (CIPRO) 500 mg tablet; Take 1 tablet (500 mg total) by mouth every 12 (twelve) hours    2. DDD (degenerative disc disease), lumbar  -     oxyCODONE-acetaminophen (PERCOCET)  mg per tablet; Take 1 tablet by mouth every 8 (eight) hours as needed for severe pain Max Daily Amount: 3 tablets    3.  Lumbar disc herniation  Assessment & Plan:  Percocet 10-3 25 4 times a day was refilled this regimen helping patient carry out day-to-day activities function remaining plan counseling follow-up as follows from a previous progress note            All medical records reviewed and reconciled patient [address] [facility] reviewed to ensure compliant with the treatment plan for pain management benefits due to the fact that patient has not responded to other measures for pain control so far and severe the pain is significant and interfering with normal daily activities I believe it is reasonable and appropriate to continue the controlled substance in order to improve his ability to function in enhance quality of life the patient has signed a narcotic agreement patient should it was utilize 1 pharmacy and not receiving narcotic from any other provider patient verbalizes understanding that breaching the contract will affect future prescription decision making and critical results in the dismissal from the practice if specifically the has been explained in understood to patient patient demonstrates following informed use of appropriate medicine to analgesia increase activity explained the side effects recommended that thisto the patient patient understands that also advised that appeared to expose him order an abuse and addiction and overdose counseling provided for prevention of any overuse abuse or addiction    Orders:  -     oxyCODONE-acetaminophen (PERCOCET)  mg per tablet; Take 1 tablet by mouth every 8 (eight) hours as needed for severe pain Max Daily Amount: 3 tablets    4. Lumbar foraminal stenosis  Assessment & Plan:  Refill Percocet 10-3 25 4 times a day as needed remaining follow-up plan finding same as under lumbar disc herniation overall unchanged    All medical records reviewed and reconciled patient PDMP database reviewed to ensure compliant with the treatment plan for pain management benefits due to the fact that patient has not responded to other measures for pain control so far and severe the pain is significant and interfering with normal daily activities I believe it is reasonable and appropriate to continue the controlled substance in order to improve his ability to function in enhance quality of life the patient has signed a narcotic agreement patient should it was utilize 1 pharmacy and not receiving narcotic from any other provider patient verbalizes understanding that breaching the contract will affect future prescription decision making and critical results in the dismissal from the practice if specifically the has been explained in understood to patient patient demonstrates following informed use of appropriate medicine to analgesia increase activity explained the side effects recommended that thisto the patient patient understands that also advised that appeared to expose him order an abuse and addiction and overdose counseling provided for prevention of any overuse abuse or addiction     Orders:  -     oxyCODONE-acetaminophen (PERCOCET)  mg per tablet;  Take 1 tablet by mouth every 8 (eight) hours as needed for severe pain Max Daily Amount: 3 tablets    5. Screening PSA (prostate specific antigen)  -     PSA, Total Screen; Future    6. Benign prostatic hyperplasia with urinary frequency  Assessment & Plan:  Complains of frequent urination at nighttime in the daytime seen by urologist awaiting cystoscopy we will check PSA level continue Flomax we will get UA CNS done start Cipro because of the burning    Orders:  -     PSA, Total Screen; Future    7. Benign essential hypertension  Assessment & Plan:  Continue low-salt diet and Maxide and Soni blood pressure controlled monitoring blood pressure very well controlled continue monitoring blood pressure at home      8. Prediabetes  Assessment & Plan:  omponent  Ref Range & Units 3/26/22  9:10 AM 5/22/21  7:07 AM 3/7/20  9:11 AM 2/10/18 10:06 AM   Hemoglobin A1C  <5.7 % 5.7 High       Fasting blood sugar was 117 awaiting repeat A1c and fasting blood sugar advised diabetic diet last A1c reviewed BMP reviewed      9. Gastric bypass status for obesity  Assessment & Plan:  Gastric bypass done about 27 years ago now BMI 30 she cannot lose weight with diet exerc at length BMI 33.47 counseling done to lose weight very low calorie diet exercise change in lifestyle patient was interested Wegovy injection could not start because of the coverage issue of      10.  Continuous opioid dependence ([address])  Assessment & Plan:      The pain contract renewed drug screen reviewed no evidence of any overuse abuse addiction  The follow-up plan finding counseling as follows from a previous progress note  All medical records reviewed and reconciled patient [address] reviewed to ensure compliant with the treatment plan for pain management benefits due to the fact that patient has not responded to other measures for pain control so far and severe the pain is significant and interfering with normal daily activities I believe it is reasonable and appropriate to continue the controlled substance in order to improve his ability to function in enhance quality of life the patient has signed a narcotic agreement patient should it was utilize 1 pharmacy and not receiving narcotic from any other provider patient verbalizes understanding that breaching the contract will affect future prescription decision making and critical results in the dismissal from the practice if specifically the has been explained in understood to patient patient demonstrates following informed use of appropriate medicine to analgesia increase activity explained the side effects recommended that thisto the patient patient understands that also advised that appeared to expose him order an abuse and addiction and overdose counseling provided for prevention of any overuse abuse or addiction            Discussion Summary and Plan: Today's care plan and medications were reviewed with patient in detail and all their questions answered to their satisfaction.     Chief Complaint   Patient presents with   • Follow-up      Subjective:  Patient came in follow-up chronic medical condition listed under visit diagnosis mainly refill for Percocet 10-3 25 4 times a day this regimen helping patient work full-time and carry out day-to-day activities also complaining of urinary burning frequency seen by urologist awaiting cystoscopy next week requested complete blood work with urine analysis urine culture and started antibiotic for details of the assessment plan see attached visit diagnosis also given complete blood work also counseling done for to lose weight        The following portions of the patient's history were reviewed and updated as appropriate: allergies, current medications, past family history, past medical history, past social history, past surgical history and problem list.    Review of Systems   Constitutional:  Negative for activity change, appetite change, chills, diaphoresis, fatigue, fever and unexpected weight change. HENT:  Negative for dental problem, drooling, ear discharge, ear pain, facial swelling, hearing loss, mouth sores, nosebleeds, postnasal drip, rhinorrhea, sinus pressure, sneezing, sore throat, tinnitus, trouble swallowing and voice change. Eyes:  Negative for photophobia, pain, discharge, redness, itching and visual disturbance. Respiratory:  Negative for apnea, cough, choking, chest tightness, shortness of breath, wheezing and stridor. Cardiovascular:  Negative for chest pain, palpitations and leg swelling. Gastrointestinal:  Negative for abdominal distention, abdominal pain, anal bleeding, blood in stool, constipation, diarrhea, nausea, rectal pain and vomiting. Endocrine: Negative for cold intolerance, heat intolerance, polydipsia, polyphagia and polyuria. Genitourinary:  Negative for decreased urine volume, difficulty urinating, dysuria, enuresis, flank pain, frequency, genital sores, hematuria and urgency. Musculoskeletal:  Positive for arthralgias, back pain and gait problem. Negative for neck pain and neck stiffness. Skin:  Negative for color change, pallor, rash and wound. Allergic/Immunologic: Negative. Negative for environmental allergies, food allergies and immunocompromised state. Neurological:  Negative for dizziness, tremors, seizures, syncope, facial asymmetry, speech difficulty, weakness, light-headedness, numbness and headaches. Psychiatric/Behavioral:  Negative for agitation, behavioral problems, confusion, decreased concentration, dysphoric mood, hallucinations, self-injury, sleep disturbance and suicidal ideas. The patient is not nervous/anxious and is not hyperactive.           Historical Information   Patient Active Problem List   Diagnosis   • Benign essential hypertension   • Benign prostatic hyperplasia   • DDD (degenerative disc disease), lumbar   • Incomplete emptying of bladder   • Lumbar disc herniation   • Lumbar foraminal stenosis   • Neuropathy   • Chronic intractable pain   • Mild episode of recurrent major depressive disorder (HCC)   • Hematospermia   • Prostatitis   • Other male erectile dysfunction   • Annual physical exam   • Primary osteoarthritis of both knees   • Chronic cough   • Non-seasonal allergic rhinitis due to pollen   • Continuous opioid dependence (HCC)   • Pain   • Swelling   • Intermittent claudication due to atherosclerosis of artery of extremity (HCC)   • Lumbar spondylosis   • Gastric bypass status for obesity   • Morbid obesity due to excess calories (HCC)   • Uncomplicated opioid dependence (HCC)   • Allergic reaction   • Prediabetes     Past Medical History:   Diagnosis Date   • Abdominal pain    • Anxiety disorder    • Arthritis    • Back pain    • Dizziness    • Headache    • Hypertension    • Lightheadedness    • Obesity    • Palpitations    • SOB (shortness of breath)      Past Surgical History:   Procedure Laterality Date   • CHOLECYSTECTOMY     • GASTRIC BYPASS       Social History     Substance and Sexual Activity   Alcohol Use None    Comment: none per [place]     Social History     Substance and Sexual Activity   Drug Use Not on file    Comment: none per Christie     Social History     Tobacco Use   Smoking Status Never   Smokeless Tobacco Never     History reviewed. No pertinent family history.   Health Maintenance Due   Topic   • Hepatitis C Screening    • HIV Screening    • COVID-19 Vaccine (6 - Moderna series)   • Annual Physical       Meds/Allergies       Current Outpatient Medications:   •  amlodipine-olmesartan (Soni) 5-40 MG, Take 1 tablet by mouth daily, Disp: 90 tablet, Rfl: 2  •  celecoxib (CeleBREX) 200 mg capsule, Take 1 capsule (200 mg total) by mouth daily, Disp: 90 capsule, Rfl: 2  •  cetirizine (ZyrTEC) 10 mg tablet, Take 1 tablet (10 mg total) by mouth daily, Disp: 30 tablet, Rfl: 2  •  ciprofloxacin (CIPRO) 500 mg tablet, Take 1 tablet (500 mg total) by mouth every 12 (twelve) hours, Disp: 60 tablet, Rfl: 0  • escitalopram (LEXAPRO) 20 mg tablet, Take 1 tablet (20 mg total) by mouth daily, Disp: 90 tablet, Rfl: 0  •  fluticasone (FLONASE) 50 mcg/act nasal spray, 1 spray into each nostril daily, Disp: 18.2 mL, Rfl: 2  •  oxyCODONE-acetaminophen (PERCOCET)  mg per tablet, Take 1 tablet by mouth every 8 (eight) hours as needed for severe pain Max Daily Amount: 3 tablets, Disp: 90 tablet, Rfl: 0  •  pramipexole (MIRAPEX) 0.5 mg tablet, Take 1 tablet (0.5 mg total) by mouth daily at bedtime, Disp: 30 tablet, Rfl: 5  •  pregabalin (LYRICA) 100 mg capsule, Take 1 capsule (100 mg total) by mouth 2 (two) times a day, Disp: 180 capsule, Rfl: 0  •  sildenafil (Viagra) 100 mg tablet, Take 1 tablet (100 mg total) by mouth daily as needed for erectile dysfunction, Disp: 10 tablet, Rfl: 55  •  tamsulosin (FLOMAX) 0.4 mg, Take 1 capsule (0.4 mg total) by mouth daily with dinner, Disp: 90 capsule, Rfl: 2  •  triamterene-hydrochlorothiazide (MAXZIDE-25) 37.5-25 mg per tablet, Take 1 tablet by mouth daily, Disp: 90 tablet, Rfl: 2  •  neomycin-polymyxin-hydrocortisone (CORTISPORIN) 0.35%-10,000 units/mL-1% otic suspension, Administer 3 drops into the left ear 4 (four) times a day for 7 days, Disp: 10 mL, Rfl: 0      Objective:    Vitals:   /80   Pulse 81   Ht 5' 11" (1.803 m)   Wt 109 kg (240 lb)   SpO2 96%   BMI 33.47 kg/m²   Body mass index is 33.47 kg/m². Vitals:    11/16/23 0959   Weight: 109 kg (240 lb)       Physical Exam  Vitals and nursing note reviewed. Constitutional:       General: He is not in acute distress. Appearance: He is well-developed. He is not ill-appearing, toxic-appearing or diaphoretic. HENT:      Head: Normocephalic and atraumatic. Right Ear: External ear normal.      Left Ear: External ear normal.      Nose: Nose normal.      Mouth/Throat:      Pharynx: No oropharyngeal exudate. Eyes:      General: Lids are normal. Lids are everted, no foreign bodies appreciated. No scleral icterus. Right eye: No discharge. Left eye: No discharge. Conjunctiva/sclera: Conjunctivae normal.      Pupils: Pupils are equal, round, and reactive to light. Neck:      Thyroid: No thyromegaly. Vascular: Normal carotid pulses. No carotid bruit, hepatojugular reflux or JVD. Trachea: No tracheal tenderness or tracheal deviation. Cardiovascular:      Rate and Rhythm: Normal rate and regular rhythm. Pulses: Normal pulses. Heart sounds: Normal heart sounds. No murmur heard. No friction rub. No gallop. Pulmonary:      Effort: Pulmonary effort is normal. No respiratory distress. Breath sounds: Normal breath sounds. No stridor. No wheezing or rales. Chest:      Chest wall: No tenderness. Abdominal:      General: Bowel sounds are normal. There is no distension. Palpations: Abdomen is soft. There is no mass. Tenderness: There is no abdominal tenderness. There is no guarding or rebound. Musculoskeletal:         General: Deformity present. No tenderness. Normal range of motion. Cervical back: Normal range of motion and neck supple. No edema, erythema or rigidity. No spinous process tenderness or muscular tenderness. Normal range of motion. Lymphadenopathy:      Head:      Right side of head: No submental, submandibular, tonsillar, preauricular or posterior auricular adenopathy. Left side of head: No submental, submandibular, tonsillar, preauricular, posterior auricular or occipital adenopathy. Cervical: No cervical adenopathy. Right cervical: No superficial, deep or posterior cervical adenopathy. Left cervical: No superficial, deep or posterior cervical adenopathy. Upper Body:      Right upper body: No pectoral adenopathy. Left upper body: No pectoral adenopathy. Skin:     General: Skin is warm and dry. Coloration: Skin is not pale. Findings: No erythema or rash. Neurological:      General: No focal deficit present. Mental Status: He is alert and oriented to person, place, and time. Cranial Nerves: No cranial nerve deficit. Sensory: No sensory deficit. Motor: No tremor, abnormal muscle tone or seizure activity. Coordination: Coordination normal.      Gait: Gait normal.      Deep Tendon Reflexes: Reflexes are normal and symmetric. Reflexes normal.   Psychiatric:         Behavior: Behavior normal.         Thought Content: Thought content normal.         Judgment: Judgment normal.         Lab Review   Office Visit on 09/20/2023   Component Date Value Ref Range Status   • RESULT ALL_PRESC MEDS SP INSTRNS 61/84/1482 Not Applicable   Final    Comment:  4-ANPP: Fentanyl Negative. Acetyl fentanyl: Fentanyl Negative. Acetyl norfentan  yl: Fentanyl Negative. Acryl fentanyl: Fentanyl Negative. Carfentanil: Fentanyl   Negative. Para-fluorofentanyl: Fentanyl Negative.      • Amphetamine Quantification 09/20/2023 negative  100 ng/mL Final   • Methamphetamine Quantification 09/20/2023 negative  100 ng/mL Final   • Butalbital Quantification 09/20/2023 negative  200 ng/mL Final   • Phenobarbital Quantification 09/20/2023 negative  200 ng/mL Final   • Secobarbital Quantification 09/20/2023 negative  200 ng/mL Final   • Alpha-Hydroxyalprazolam Quantifica* 09/20/2023 positive-573.198-I (A)  20 ng/mL Final   • 7-Amino-Clonazepam Quantification * 09/20/2023 negative  20 ng/mL Final   • Nordiazepam Quantification 09/20/2023 negative  40 ng/mL Final   • Temazepam Quantification 09/20/2023 negative  50 ng/mL Final   • Oxazepam Quantification 09/20/2023 negative  40 ng/mL Final   • Lorazepam Quantification 09/20/2023 negative  40 ng/mL Final   • Gabapentin Quantification 09/20/2023 negative  1000 Final   • PREGABALIN QUANTIFICATION 09/20/2023 negative-I (A)  400 Final   • Cocaine metabolite Quantification 09/20/2023 negative  50 ng/mL Final   • 6-LEEANN (Heroin metabolite) Quantifi* 09/20/2023 negative  10 ng/mL Final   • Buprenorphine Quantification 09/20/2023 negative  5 ng/mL Final   • Norbuprenorphine Quantification 09/20/2023 negative  20 ng/mL Final   • Dextrorphan (Dextromethorphan meta* 09/20/2023 negative  50 ng/mL Final   • Meperidine Quantification 09/20/2023 negative  50 ng/mL Final   • Normeperidine Quantification 09/20/2023 negative  50 ng/mL Final   • Naltrexone Quantification 09/20/2023 negative  10 ng/mL Final   • NALTREXOL (NALTREXONE METABOLITE) * 09/20/2023 negative  10 ng/mL Final   • Fentanyl Quantification 09/20/2023 negative  1 ng/mL Final   • Norfentanyl Quantification 09/20/2023 negative  8 ng/mL Final   • 4-ANPP Quantification 09/20/2023 Fen Neg  2 ng/mL Final   • Acetyl fentanyl Quantification 09/20/2023 Fen Neg  2 ng/mL Final   • Acetyl norfentanyl Quantification 09/20/2023 Fen Neg  5 ng/mL Final   • Acryl fentanyl Quantification 09/20/2023 Fen Neg  1 ng/mL Final   • Carfentanil Quantification 09/20/2023 Fen Neg  2 ng/mL Final   • Para-fluorofentanyl Quantification 09/20/2023 Fen Neg  1 ng/mL Final   • Methadone Quantification 09/20/2023 negative  100 ng/mL Final   • EDDP (Methadone metabolite) Quanti* 09/20/2023 negative  100 ng/mL Final   • Oxycodone Quantification 09/20/2023 positive-4612.905  50 ng/mL Final   • Noroxycodone Quantification 09/20/2023 positive-5244.238  50 ng/mL Final   • Oxymorphone Quantification 09/20/2023 positive-6534.695  50 ng/mL Final   • Tapentadol Quantification 09/20/2023 negative  50 ng/mL Final   • cTHC (Marijuana metabolite) Quanti* 09/20/2023 negative  15 ng/mL Final   • Tramadol Quantification 09/20/2023 negative  100 ng/mL Final   • O-desmethyl-tramadol Quantification 09/20/2023 negative  100 ng/mL Final   • N-DESMETHYL-TRAMADOL QUANTIFICATION 09/20/2023 negative  100 ng/mL Final   • Codeine Quantification 09/20/2023 negative  50 ng/mL Final   • Morphine Quantification 09/20/2023 negative  50 ng/mL Final   • Hydrocodone Quantification 09/20/2023 negative  50 ng/mL Final • Norhydrocodone Quantification 09/20/2023 negative  50 ng/mL Final   • Hydromorphone Quantification 09/20/2023 negative  50 ng/mL Final   • EUTYLONE QUANTIFICATION 09/20/2023 negative  10 ng/mL Final   • METHYLONE QUANTIFICATION 09/20/2023 negative  3 ng/mL Final   • Ethyl Glucuronide Quantification 09/20/2023 negative  500 ng/mL Final   • Ethyl Sulfate Quantification 09/20/2023 negative  500 ng/mL Final   • Mitragynine (Kratom alkaloid) Jemal* 09/20/2023 negative  1 ng/mL Final   • 2-HH-Unobcwjmyoy (Kratom alkaloid)* 09/20/2023 negative  1 ng/mL Final   • 5F-ADB-M7 09/20/2023 negative  10 ng/mL Final   • PN-HBOVFEHT-W5 METABOLITE QUANTIFI* 09/20/2023 negative  10 ng/mL Final   • QCXE-AEAGROLM-Q7 METABOLITE QUANTI* 09/20/2023 negative  10 ng/mL Final   • QWG704 metabolite Quantification 09/20/2023 negative  10 ng/mL Final   • GTU656 metabolite Quantification 09/20/2023 negative  10 ng/mL Final   • RCS4 METABOLITE QUANTIFICATION 09/20/2023 negative  10 ng/mL Final   • KFN96/ METABOLITE QUANTIFICAT* 09/20/2023 negative  10 ng/mL Final   • Methylphenidate Quantification 09/20/2023 negative  50 ng/mL Final   • RITALINIC ACID QUANTIFICATION 09/20/2023 negative  50 ng/mL Final   • PHENTERMINE QUANTIFICATION 09/20/2023 negative  50 ng/mL Final   • OXIDANT 09/20/2023 normal-0  <200 ug/mL ug/mL Final   • CREATININE 09/20/2023 normal-138.1  >20 mg/dL mg/dL Final   • pH 09/20/2023 normal-5.5  4.5 - 9.5 Final   • SPECIFIC GRAVITY URINE 09/20/2023 normal-1.015  1.003 - 1.035 Final         Patient Instructions   Prostatitis   WHAT YOU NEED TO KNOW:   Prostatitis is inflammation of the prostate gland. The prostate gland is the male sex gland that produces a fluid that is part of semen. It is about the size of a walnut and is located under the bladder. Prostatitis is not contagious. Prostatitis may be an acute (short-term) or chronic (long-term) condition. You can get prostatitis at any age and more than 1 time. DISCHARGE INSTRUCTIONS:   Call your doctor or urologist if:   You have a fever. You see blood in your urine. You cannot urinate. Your symptoms are getting worse, or they return after you have been treated. You have questions or concerns about your condition or care. Medicines: You may need any of the following:  Alpha blockers  relax the muscles in your prostate and bladder to help you urinate more easily. Antibiotics  may be given to treat a bacterial infection. NSAIDs  help decrease swelling and pain or fever. This medicine is available with or without a doctor's order. NSAIDs can cause stomach bleeding or kidney problems in certain people. If you take blood thinner medicine, always ask your healthcare provider if NSAIDs are safe for you. Always read the medicine label and follow directions. Prescription pain medicine  may be given. Ask your healthcare provider how to take this medicine safely. Some prescription pain medicines contain acetaminophen. Do not take other medicines that contain acetaminophen without talking to your healthcare provider. Too much acetaminophen may cause liver damage. Prescription pain medicine may cause constipation. Ask your healthcare provider how to prevent or treat constipation. Take your medicine as directed. Contact your healthcare provider if you think your medicine is not helping or if you have side effects. Tell your provider if you are allergic to any medicine. Keep a list of the medicines, vitamins, and herbs you take. Include the amounts, and when and why you take them. Bring the list or the pill bottles to follow-up visits. Carry your medicine list with you in case of an emergency. Go to a healthcare provider for a yearly prostate exam  if you are over the age of 36. Self-care: Do prostate massages as directed. Massage can help decrease fullness and prevent infection.  Healthcare providers can teach you how to do a prostate massage. Place a heating pad on the area. Heat may help blood flow to your prostate area. Warm baths may decrease prostate fullness and discomfort. Drink plenty of liquids to prevent dehydration. Ask your healthcare provider how much liquid to drink each day and which liquids are best for you. Do not drink alcohol or eat spicy foods  until you have finished treatment for prostatitis. Limit the amount of caffeine you drink. Urinate often. Do not wait to urinate. You may have sex  if you feel well. Follow up with your doctor or urologist as directed:  Write down your questions so you remember to ask them during your visits. © Copyright [employer] 2023 Information is for End User's use only and may not be sold, redistributed or otherwise used for commercial purposes. The above information is an  only. It is not intended as medical advice for individual conditions or treatments. Talk to your doctor, nurse or pharmacist before following any medical regimen to see if it is safe and effective for you. Eneida Garcia MD        "This note has been constructed using a voice recognition system. Therefore there may be syntax, spelling, and/or grammatical errors.  Please call if you have any questions. "

## 2023-12-15 ENCOUNTER — OFFICE VISIT (OUTPATIENT)
Dept: INTERNAL MEDICINE CLINIC | Facility: CLINIC | Age: 58
End: 2023-12-15
Payer: COMMERCIAL

## 2023-12-15 VITALS
BODY MASS INDEX: 34.72 KG/M2 | HEIGHT: 71 IN | OXYGEN SATURATION: 100 % | HEART RATE: 61 BPM | TEMPERATURE: 98 F | DIASTOLIC BLOOD PRESSURE: 74 MMHG | WEIGHT: 248 LBS | SYSTOLIC BLOOD PRESSURE: 118 MMHG

## 2023-12-15 DIAGNOSIS — M51.26 LUMBAR DISC HERNIATION: Primary | ICD-10-CM

## 2023-12-15 DIAGNOSIS — M51.36 DDD (DEGENERATIVE DISC DISEASE), LUMBAR: ICD-10-CM

## 2023-12-15 DIAGNOSIS — R35.0 BENIGN PROSTATIC HYPERPLASIA WITH URINARY FREQUENCY: ICD-10-CM

## 2023-12-15 DIAGNOSIS — I10 BENIGN ESSENTIAL HYPERTENSION: ICD-10-CM

## 2023-12-15 DIAGNOSIS — M48.061 LUMBAR FORAMINAL STENOSIS: ICD-10-CM

## 2023-12-15 DIAGNOSIS — N40.1 BENIGN PROSTATIC HYPERPLASIA WITH URINARY FREQUENCY: ICD-10-CM

## 2023-12-15 PROCEDURE — 99213 OFFICE O/P EST LOW 20 MIN: CPT | Performed by: INTERNAL MEDICINE

## 2023-12-15 RX ORDER — OXYCODONE AND ACETAMINOPHEN 10; 325 MG/1; MG/1
1 TABLET ORAL EVERY 8 HOURS PRN
Qty: 90 TABLET | Refills: 0 | Status: SHIPPED | OUTPATIENT
Start: 2023-12-15

## 2023-12-15 NOTE — ASSESSMENT & PLAN NOTE
Continue low-salt diet and Maxide and Soni blood pressure controlled monitoring blood pressure very well controlled continue monitoring blood pressure controlled

## 2023-12-15 NOTE — PATIENT INSTRUCTIONS
Back Pain   WHAT YOU NEED TO KNOW:   What do I need to know about back pain? Back pain is common. You may have back pain and muscle spasms. You may feel sore or stiff on one or both sides of your back. The pain may spread to your lower body. What increases my risk for back pain? A condition that affects your spine, joints, or muscles, such as muscle tension or disc problems    Repeated bending, lifting, or twisting, or lifting heavy items    Injury from a fall or accident    Lack of regular physical activity     Obesity or pregnancy     Smoking    Aging    Driving, sitting, or standing for long periods    Bad posture while sitting or standing    How is back pain diagnosed? Your healthcare provider will ask if you have any medical conditions. He or she may ask if you have a history of back pain and how it started. He or she may watch you stand and walk, and check your range of motion. Show him or her where you feel pain and what makes it better or worse. Describe the pain, how bad it is, and how long it lasts. Tell your provider if your pain worsens at night or when you lie on your back. How is back pain treated? Medicines:      NSAIDs  help decrease swelling and pain or fever. This medicine is available with or without a doctor's order. NSAIDs can cause stomach bleeding or kidney problems in certain people. If you take blood thinner medicine, always ask your healthcare provider if NSAIDs are safe for you. Always read the medicine label and follow directions. Acetaminophen  decreases pain and fever. It is available without a doctor's order. Ask how much to take and how often to take it. Follow directions. Read the labels of all other medicines you are using to see if they also contain acetaminophen, or ask your doctor or pharmacist. Acetaminophen can cause liver damage if not taken correctly. Muscle relaxers  help decrease muscle spasms and back pain.     Acupressure  may be recommended to decrease pain and improve movement. Acupressure is pressure or localized massage to the area of your back pain. A transcutaneous electrical nerve stimulation (TENS) unit  is a portable, pocket-sized, battery-powered device that attaches to your skin. It is usually placed over the area of pain. It uses mild, safe electrical signals to help control pain. How do I manage back pain? Apply ice  on your back for 15 to 20 minutes every hour or as directed. Use an ice pack, or put crushed ice in a plastic bag. Cover it with a towel before you apply it to your skin. Ice helps prevent tissue damage and decreases pain. Apply heat  on your back for 20 to 30 minutes every 2 hours for as many days as directed. Heat helps decrease pain and muscle spasms. Stay active  as much as you can without causing more pain. Bed rest could make your back pain worse. Avoid heavy lifting until your pain is gone. Go to physical therapy  as directed. A physical therapist can teach you exercises to help improve movement and strength, and to decrease pain. Call your local emergency number (911 in the [address]) if:   You have severe back pain with chest pain. You cannot control your urine or bowel movements. Your pain becomes so severe that you cannot walk. When should I seek immediate care? You have pain, numbness, or weakness in one or both legs. You have severe back pain, nausea, and vomiting. You have severe back pain that spreads to your side or genital area. When should I call my doctor? You have back pain that does not get better with rest and pain medicine. You have a fever. You have pain that worsens when you are on your back or when you rest.    You have pain that worsens when you cough or sneeze. You lose weight without trying. You have questions or concerns about your condition or care. CARE AGREEMENT:   You have the right to help plan your care.  Learn about your health condition and how it may be treated. Discuss treatment options with your healthcare providers to decide what care you want to receive. You always have the right to refuse treatment. The above information is an  only. It is not intended as medical advice for individual conditions or treatments. Talk to your doctor, nurse or pharmacist before following any medical regimen to see if it is safe and effective for you. © Copyright Elisha Goltz 2023 Information is for End User's use only and may not be sold, redistributed or otherwise used for commercial purposes.

## 2023-12-15 NOTE — ASSESSMENT & PLAN NOTE
Refilled Percocet 10-3 25 3 times a day remaining follow-up plan finding as follows counseling done        All medical records reviewed and reconciled patient PDMP database reviewed to ensure compliant with the treatment plan for pain management benefits due to the fact that patient has not responded to other measures for pain control so far and severe the pain is significant and interfering with normal daily activities I believe it is reasonable and appropriate to continue the controlled substance in order to improve his ability to function in enhance quality of life the patient has signed a narcotic agreement patient should it was utilize 1 pharmacy and not receiving narcotic from any other provider patient verbalizes understanding that breaching the contract will affect future prescription decision making and critical results in the dismissal from the practice if specifically the has been explained in understood to patient patient demonstrates following informed use of appropriate medicine to analgesia increase activity explained the side effects recommended that thisto the patient patient understands that also advised that appeared to expose him order an abuse and addiction and overdose counseling provided for prevention of any overuse abuse or addiction

## 2023-12-15 NOTE — PROGRESS NOTES
Dr. Tamera Tipton Office Visit Note  12/15/23     Zeke Camejo 62 y.o. male MRN: [id number]  : 1965    Assessment:     1. Lumbar disc herniation  Assessment & Plan:  Percocet 10-3 25 3 times a day refilled            All medical records reviewed and reconciled patient PDMP database reviewed to ensure compliant with the treatment plan for pain management benefits due to the fact that patient has not responded to other measures for pain control so far and severe the pain is significant and interfering with normal daily activities I believe it is reasonable and appropriate to continue the controlled substance in order to improve his ability to function in enhance quality of life the patient has signed a narcotic agreement patient should it was utilize 1 pharmacy and not receiving narcotic from any other provider patient verbalizes understanding that breaching the contract will affect future prescription decision making and critical results in the dismissal from the practice if specifically the has been explained in understood to patient patient demonstrates following informed use of appropriate medicine to analgesia increase activity explained the side effects recommended that thisto the patient patient understands that also advised that appeared to expose him order an abuse and addiction and overdose counseling provided for prevention of any overuse abuse or addiction    Orders:  -     oxyCODONE-acetaminophen (PERCOCET)  mg per tablet; Take 1 tablet by mouth every 8 (eight) hours as needed for severe pain Max Daily Amount: 3 tablets    2. DDD (degenerative disc disease), lumbar  -     oxyCODONE-acetaminophen (PERCOCET)  mg per tablet; Take 1 tablet by mouth every 8 (eight) hours as needed for severe pain Max Daily Amount: 3 tablets    3.  Lumbar foraminal stenosis  Assessment & Plan:  Refilled Percocet 10-3 25 3 times a day remaining follow-up plan finding as follows counseling done        All medical records reviewed and reconciled patient PDMP database reviewed to ensure compliant with the treatment plan for pain management benefits due to the fact that patient has not responded to other measures for pain control so far and severe the pain is significant and interfering with normal daily activities I believe it is reasonable and appropriate to continue the controlled substance in order to improve his ability to function in enhance quality of life the patient has signed a narcotic agreement patient should it was utilize 1 pharmacy and not receiving narcotic from any other provider patient verbalizes understanding that breaching the contract will affect future prescription decision making and critical results in the dismissal from the practice if specifically the has been explained in understood to patient patient demonstrates following informed use of appropriate medicine to analgesia increase activity explained the side effects recommended that thisto the patient patient understands that also advised that appeared to expose him order an abuse and addiction and overdose counseling provided for prevention of any overuse abuse or addiction     Orders:  -     oxyCODONE-acetaminophen (PERCOCET)  mg per tablet; Take 1 tablet by mouth every 8 (eight) hours as needed for severe pain Max Daily Amount: 3 tablets    4. Benign essential hypertension  Assessment & Plan:  Continue low-salt diet and Maxide and Soni blood pressure controlled monitoring blood pressure very well controlled continue monitoring blood pressure controlled      5. Benign prostatic hyperplasia with urinary frequency  Assessment & Plan:  Continue Flomax seen by urology awaiting cystoscopy            Discussion Summary and Plan: Today's care plan and medications were reviewed with patient in detail and all their questions answered to their satisfaction.     Chief Complaint   Patient presents with   • Follow-up      Subjective:  Came in for refill for Percocet 10-3 25 3 times a day this regimen helping patient to function and carry out day-to-day activities awaiting cystoscopy seen by urology        The following portions of the patient's history were reviewed and updated as appropriate: allergies, current medications, past family history, past medical history, past social history, past surgical history and problem list.    Review of Systems   Constitutional:  Negative for activity change, appetite change, chills, diaphoresis, fatigue, fever and unexpected weight change. HENT:  Negative for dental problem, drooling, ear discharge, ear pain, facial swelling, hearing loss, mouth sores, nosebleeds, postnasal drip, rhinorrhea, sinus pressure, sneezing, sore throat, tinnitus, trouble swallowing and voice change. Eyes:  Negative for photophobia, pain, discharge, redness, itching and visual disturbance. Respiratory:  Negative for apnea, cough, choking, chest tightness, shortness of breath, wheezing and stridor. Cardiovascular:  Negative for chest pain, palpitations and leg swelling. Gastrointestinal:  Negative for abdominal distention, abdominal pain, anal bleeding, blood in stool, constipation, diarrhea, nausea, rectal pain and vomiting. Endocrine: Negative for cold intolerance, heat intolerance, polydipsia, polyphagia and polyuria. Genitourinary:  Positive for dysuria and frequency. Negative for decreased urine volume, difficulty urinating, enuresis, flank pain, genital sores, hematuria and urgency. Musculoskeletal:  Positive for arthralgias, back pain and gait problem. Negative for neck pain and neck stiffness. Skin:  Negative for color change, pallor, rash and wound. Allergic/Immunologic: Negative. Negative for environmental allergies, food allergies and immunocompromised state. Neurological:  Negative for dizziness, tremors, seizures, syncope, facial asymmetry, speech difficulty, weakness, light-headedness, numbness and headaches. Psychiatric/Behavioral:  Negative for agitation, behavioral problems, confusion, decreased concentration, dysphoric mood, hallucinations, self-injury, sleep disturbance and suicidal ideas. The patient is not nervous/anxious and is not hyperactive. Historical Information   Patient Active Problem List   Diagnosis   • Benign essential hypertension   • Benign prostatic hyperplasia   • DDD (degenerative disc disease), lumbar   • Incomplete emptying of bladder   • Lumbar disc herniation   • Lumbar foraminal stenosis   • Neuropathy   • Chronic intractable pain   • Mild episode of recurrent major depressive disorder (HCC)   • Hematospermia   • Prostatitis   • Other male erectile dysfunction   • Annual physical exam   • Primary osteoarthritis of both knees   • Chronic cough   • Non-seasonal allergic rhinitis due to pollen   • Continuous opioid dependence (HCC)   • Pain   • Swelling   • Intermittent claudication due to atherosclerosis of artery of extremity ([facility])   • Lumbar spondylosis   • Gastric bypass status for obesity   • Morbid obesity due to excess calories ([facility])   • Uncomplicated opioid dependence ([address])   • Allergic reaction   • Prediabetes     Past Medical History:   Diagnosis Date   • Abdominal pain    • Anxiety disorder    • Arthritis    • Back pain    • Dizziness    • Headache    • Hypertension    • Lightheadedness    • Obesity    • Palpitations    • SOB (shortness of breath)      Past Surgical History:   Procedure Laterality Date   • CHOLECYSTECTOMY     • GASTRIC BYPASS       Social History     Substance and Sexual Activity   Alcohol Use None    Comment: none per [place] [facility]     Social History     Substance and Sexual Activity   Drug Use Not on file    Comment: none per [place] [facility]     Social History     Tobacco Use   Smoking Status Never   Smokeless Tobacco Never     History reviewed. No pertinent family history.   Health Maintenance Due   Topic   • Hepatitis C Screening    • HIV Screening    • COVID-19 Vaccine (7 - 2023-24 season)   • Annual Physical       Meds/Allergies       Current Outpatient Medications:   •  amlodipine-olmesartan (Soni) 5-40 MG, Take 1 tablet by mouth daily, Disp: 90 tablet, Rfl: 2  •  celecoxib (CeleBREX) 200 mg capsule, Take 1 capsule (200 mg total) by mouth daily, Disp: 90 capsule, Rfl: 2  •  cetirizine (ZyrTEC) 10 mg tablet, Take 1 tablet (10 mg total) by mouth daily, Disp: 30 tablet, Rfl: 2  •  ciprofloxacin (CIPRO) 500 mg tablet, Take 1 tablet (500 mg total) by mouth every 12 (twelve) hours, Disp: 60 tablet, Rfl: 0  •  escitalopram (LEXAPRO) 20 mg tablet, Take 1 tablet (20 mg total) by mouth daily, Disp: 90 tablet, Rfl: 0  •  fluticasone (FLONASE) 50 mcg/act nasal spray, 1 spray into each nostril daily, Disp: 18.2 mL, Rfl: 2  •  oxyCODONE-acetaminophen (PERCOCET)  mg per tablet, Take 1 tablet by mouth every 8 (eight) hours as needed for severe pain Max Daily Amount: 3 tablets, Disp: 90 tablet, Rfl: 0  •  pramipexole (MIRAPEX) 0.5 mg tablet, Take 1 tablet (0.5 mg total) by mouth daily at bedtime, Disp: 30 tablet, Rfl: 5  •  pregabalin (LYRICA) 100 mg capsule, Take 1 capsule (100 mg total) by mouth 2 (two) times a day, Disp: 180 capsule, Rfl: 0  •  sildenafil (Viagra) 100 mg tablet, Take 1 tablet (100 mg total) by mouth daily as needed for erectile dysfunction, Disp: 10 tablet, Rfl: 55  •  tamsulosin (FLOMAX) 0.4 mg, Take 1 capsule (0.4 mg total) by mouth daily with dinner, Disp: 90 capsule, Rfl: 2  •  triamterene-hydrochlorothiazide (MAXZIDE-25) 37.5-25 mg per tablet, Take 1 tablet by mouth daily, Disp: 90 tablet, Rfl: 2  •  neomycin-polymyxin-hydrocortisone (CORTISPORIN) 0.35%-10,000 units/mL-1% otic suspension, Administer 3 drops into the left ear 4 (four) times a day for 7 days, Disp: 10 mL, Rfl: 0      Objective:    Vitals:   /74   Pulse 61   Temp 98 °F (36.7 °C)   Ht 5' 11" (1.803 m)   Wt 112 kg (248 lb)   SpO2 100%   BMI 34.59 kg/m²   Body mass index is 34.59 kg/m². Vitals:    12/15/23 1001   Weight: 112 kg (248 lb)       Physical Exam  Vitals and nursing note reviewed. Constitutional:       General: He is not in acute distress. Appearance: He is well-developed. He is not ill-appearing, toxic-appearing or diaphoretic. HENT:      Head: Normocephalic and atraumatic. Right Ear: External ear normal.      Left Ear: External ear normal.      Nose: Nose normal.      Mouth/Throat:      Pharynx: No oropharyngeal exudate. Eyes:      General: Lids are normal. Lids are everted, no foreign bodies appreciated. No scleral icterus. Right eye: No discharge. Left eye: No discharge. Conjunctiva/sclera: Conjunctivae normal.      Pupils: Pupils are equal, round, and reactive to light. Neck:      Thyroid: No thyromegaly. Vascular: Normal carotid pulses. No carotid bruit, hepatojugular reflux or JVD. Trachea: No tracheal tenderness or tracheal deviation. Cardiovascular:      Rate and Rhythm: Normal rate and regular rhythm. Pulses: Normal pulses. Heart sounds: Normal heart sounds. No murmur heard. No friction rub. No gallop. Pulmonary:      Effort: Pulmonary effort is normal. No respiratory distress. Breath sounds: Normal breath sounds. No stridor. No wheezing or rales. Chest:      Chest wall: No tenderness. Abdominal:      General: Bowel sounds are normal. There is no distension. Palpations: Abdomen is soft. There is no mass. Tenderness: There is no abdominal tenderness. There is no guarding or rebound. Musculoskeletal:         General: No tenderness. Normal range of motion. Cervical back: Normal range of motion and neck supple. No edema, erythema or rigidity. No spinous process tenderness or muscular tenderness. Normal range of motion. Lymphadenopathy:      Head:      Right side of head: No submental, submandibular, tonsillar, preauricular or posterior auricular adenopathy.       Left side of head: No submental, submandibular, tonsillar, preauricular, posterior auricular or occipital adenopathy. Cervical: No cervical adenopathy. Right cervical: No superficial, deep or posterior cervical adenopathy. Left cervical: No superficial, deep or posterior cervical adenopathy. Upper Body:      Right upper body: No pectoral adenopathy. Left upper body: No pectoral adenopathy. Skin:     General: Skin is warm and dry. Coloration: Skin is not pale. Findings: No erythema or rash. Neurological:      General: No focal deficit present. Mental Status: He is alert and oriented to person, place, and time. Cranial Nerves: No cranial nerve deficit. Sensory: No sensory deficit. Motor: No tremor, abnormal muscle tone or seizure activity. Coordination: Coordination normal.      Gait: Gait normal.      Deep Tendon Reflexes: Reflexes are normal and symmetric. Reflexes normal.   Psychiatric:         Behavior: Behavior normal.         Thought Content: Thought content normal.         Judgment: Judgment normal.         Lab Review   No visits with results within 2 Month(s) from this visit. Latest known visit with results is:   Office Visit on 09/20/2023   Component Date Value Ref Range Status   • RESULT ALL_PRES MEDS SP INSTRNS 76/87/9022 Not Applicable   Final    Comment:  4-ANPP: Fentanyl Negative. Acetyl fentanyl: Fentanyl Negative. Acetyl norfentan  yl: Fentanyl Negative. Acryl fentanyl: Fentanyl Negative. Carfentanil: Fentanyl   Negative. Para-fluorofentanyl: Fentanyl Negative.      • Amphetamine Quantification 09/20/2023 negative  100 ng/mL Final   • Methamphetamine Quantification 09/20/2023 negative  100 ng/mL Final   • Butalbital Quantification 09/20/2023 negative  200 ng/mL Final   • Phenobarbital Quantification 09/20/2023 negative  200 ng/mL Final   • Secobarbital Quantification 09/20/2023 negative  200 ng/mL Final   • Alpha-Hydroxyalprazolam Quantifica* 09/20/2023 positive-573.198-I (A)  20 ng/mL Final   • 7-Amino-Clonazepam Quantification * 09/20/2023 negative  20 ng/mL Final   • Nordiazepam Quantification 09/20/2023 negative  40 ng/mL Final   • Temazepam Quantification 09/20/2023 negative  50 ng/mL Final   • Oxazepam Quantification 09/20/2023 negative  40 ng/mL Final   • Lorazepam Quantification 09/20/2023 negative  40 ng/mL Final   • Gabapentin Quantification 09/20/2023 negative  1000 Final   • PREGABALIN QUANTIFICATION 09/20/2023 negative-I (A)  400 Final   • Cocaine metabolite Quantification 09/20/2023 negative  50 ng/mL Final   • 6-LEEANN (Heroin metabolite) Quantifi* 09/20/2023 negative  10 ng/mL Final   • Buprenorphine Quantification 09/20/2023 negative  5 ng/mL Final   • Norbuprenorphine Quantification 09/20/2023 negative  20 ng/mL Final   • Dextrorphan (Dextromethorphan meta* 09/20/2023 negative  50 ng/mL Final   • Meperidine Quantification 09/20/2023 negative  50 ng/mL Final   • Normeperidine Quantification 09/20/2023 negative  50 ng/mL Final   • Naltrexone Quantification 09/20/2023 negative  10 ng/mL Final   • NALTREXOL (NALTREXONE METABOLITE) * 09/20/2023 negative  10 ng/mL Final   • Fentanyl Quantification 09/20/2023 negative  1 ng/mL Final   • Norfentanyl Quantification 09/20/2023 negative  8 ng/mL Final   • 4-ANPP Quantification 09/20/2023 Fen Neg  2 ng/mL Final   • Acetyl fentanyl Quantification 09/20/2023 Fen Neg  2 ng/mL Final   • Acetyl norfentanyl Quantification 09/20/2023 Fen Neg  5 ng/mL Final   • Acryl fentanyl Quantification 09/20/2023 Fen Neg  1 ng/mL Final   • Carfentanil Quantification 09/20/2023 Fen Neg  2 ng/mL Final   • Para-fluorofentanyl Quantification 09/20/2023 Fen Neg  1 ng/mL Final   • Methadone Quantification 09/20/2023 negative  100 ng/mL Final   • EDDP (Methadone metabolite) Quanti* 09/20/2023 negative  100 ng/mL Final   • Oxycodone Quantification 09/20/2023 positive-4612.905  50 ng/mL Final   • Noroxycodone Quantification 09/20/2023 positive-5244.238  50 ng/mL Final   • Oxymorphone Quantification 09/20/2023 positive-6534.695  50 ng/mL Final   • Tapentadol Quantification 09/20/2023 negative  50 ng/mL Final   • cTHC (Marijuana metabolite) Quanti* 09/20/2023 negative  15 ng/mL Final   • Tramadol Quantification 09/20/2023 negative  100 ng/mL Final   • O-desmethyl-tramadol Quantification 09/20/2023 negative  100 ng/mL Final   • N-DESMETHYL-TRAMADOL QUANTIFICATION 09/20/2023 negative  100 ng/mL Final   • Codeine Quantification 09/20/2023 negative  50 ng/mL Final   • Morphine Quantification 09/20/2023 negative  50 ng/mL Final   • Hydrocodone Quantification 09/20/2023 negative  50 ng/mL Final   • Norhydrocodone Quantification 09/20/2023 negative  50 ng/mL Final   • Hydromorphone Quantification 09/20/2023 negative  50 ng/mL Final   • EUTYLONE QUANTIFICATION 09/20/2023 negative  10 ng/mL Final   • METHYLONE QUANTIFICATION 09/20/2023 negative  3 ng/mL Final   • Ethyl Glucuronide Quantification 09/20/2023 negative  500 ng/mL Final   • Ethyl Sulfate Quantification 09/20/2023 negative  500 ng/mL Final   • Mitragynine (Kratom alkaloid) Jemal* 09/20/2023 negative  1 ng/mL Final   • 5-ZZ-Tqzqsvgzprj (Kratom alkaloid)* 09/20/2023 negative  1 ng/mL Final   • 5F-ADB-M7 09/20/2023 negative  10 ng/mL Final   • SP-XDSMWAAI-U3 METABOLITE QUANTIFI* 09/20/2023 negative  10 ng/mL Final   • YSCF-ZAQHVUGF-I7 METABOLITE QUANTI* 09/20/2023 negative  10 ng/mL Final   • PVE546 metabolite Quantification 09/20/2023 negative  10 ng/mL Final   • GTA946 metabolite Quantification 09/20/2023 negative  10 ng/mL Final   • RCS4 METABOLITE QUANTIFICATION 09/20/2023 negative  10 ng/mL Final   • BMA49/ METABOLITE QUANTIFICAT* 09/20/2023 negative  10 ng/mL Final   • Methylphenidate Quantification 09/20/2023 negative  50 ng/mL Final   • RITALINIC ACID QUANTIFICATION 09/20/2023 negative  50 ng/mL Final   • PHENTERMINE QUANTIFICATION 09/20/2023 negative  50 ng/mL Final   • OXIDANT 09/20/2023 normal-0  <200 ug/mL ug/mL Final   • CREATININE 09/20/2023 normal-138.1  >20 mg/dL mg/dL Final   • pH 09/20/2023 normal-5.5  4.5 - 9.5 Final   • SPECIFIC GRAVITY URINE 09/20/2023 normal-1.015  1.003 - 1.035 Final         Patient Instructions   Back Pain   WHAT YOU NEED TO KNOW:   What do I need to know about back pain? Back pain is common. You may have back pain and muscle spasms. You may feel sore or stiff on one or both sides of your back. The pain may spread to your lower body. What increases my risk for back pain? A condition that affects your spine, joints, or muscles, such as muscle tension or disc problems    Repeated bending, lifting, or twisting, or lifting heavy items    Injury from a fall or accident    Lack of regular physical activity     Obesity or pregnancy     Smoking    Aging    Driving, sitting, or standing for long periods    Bad posture while sitting or standing    How is back pain diagnosed? Your healthcare provider will ask if you have any medical conditions. He or she may ask if you have a history of back pain and how it started. He or she may watch you stand and walk, and check your range of motion. Show him or her where you feel pain and what makes it better or worse. Describe the pain, how bad it is, and how long it lasts. Tell your provider if your pain worsens at night or when you lie on your back. How is back pain treated? Medicines:      NSAIDs  help decrease swelling and pain or fever. This medicine is available with or without a doctor's order. NSAIDs can cause stomach bleeding or kidney problems in certain people. If you take blood thinner medicine, always ask your healthcare provider if NSAIDs are safe for you. Always read the medicine label and follow directions. Acetaminophen  decreases pain and fever. It is available without a doctor's order. Ask how much to take and how often to take it. Follow directions.  Read the labels of all other medicines you are using to see if they also contain acetaminophen, or ask your doctor or pharmacist. Acetaminophen can cause liver damage if not taken correctly. Muscle relaxers  help decrease muscle spasms and back pain. Acupressure  may be recommended to decrease pain and improve movement. Acupressure is pressure or localized massage to the area of your back pain. A transcutaneous electrical nerve stimulation (TENS) unit  is a portable, pocket-sized, battery-powered device that attaches to your skin. It is usually placed over the area of pain. It uses mild, safe electrical signals to help control pain. How do I manage back pain? Apply ice  on your back for 15 to 20 minutes every hour or as directed. Use an ice pack, or put crushed ice in a plastic bag. Cover it with a towel before you apply it to your skin. Ice helps prevent tissue damage and decreases pain. Apply heat  on your back for 20 to 30 minutes every 2 hours for as many days as directed. Heat helps decrease pain and muscle spasms. Stay active  as much as you can without causing more pain. Bed rest could make your back pain worse. Avoid heavy lifting until your pain is gone. Go to physical therapy  as directed. A physical therapist can teach you exercises to help improve movement and strength, and to decrease pain. Call your local emergency number (911 in the [address]) if:   You have severe back pain with chest pain. You cannot control your urine or bowel movements. Your pain becomes so severe that you cannot walk. When should I seek immediate care? You have pain, numbness, or weakness in one or both legs. You have severe back pain, nausea, and vomiting. You have severe back pain that spreads to your side or genital area. When should I call my doctor? You have back pain that does not get better with rest and pain medicine. You have a fever.     You have pain that worsens when you are on your back or when you rest.    You have pain that worsens when you cough or sneeze. You lose weight without trying. You have questions or concerns about your condition or care. CARE AGREEMENT:   You have the right to help plan your care. Learn about your health condition and how it may be treated. Discuss treatment options with your healthcare providers to decide what care you want to receive. You always have the right to refuse treatment. The above information is an  only. It is not intended as medical advice for individual conditions or treatments. Talk to your doctor, nurse or pharmacist before following any medical regimen to see if it is safe and effective for you. © Copyright Kyl[employer] Pablo 2023 Information is for End User's use only and may not be sold, redistributed or otherwise used for commercial purposes. Rose Rodriguez MD        "This note has been constructed using a voice recognition system. Therefore there may be syntax, spelling, and/or grammatical errors.  Please call if you have any questions. "

## 2023-12-15 NOTE — ASSESSMENT & PLAN NOTE
Percocet 10-3 25 3 times a day refilled            All medical records reviewed and reconciled patient PDMP database reviewed to ensure compliant with the treatment plan for pain management benefits due to the fact that patient has not responded to other measures for pain control so far and severe the pain is significant and interfering with normal daily activities I believe it is reasonable and appropriate to continue the controlled substance in order to improve his ability to function in enhance quality of life the patient has signed a narcotic agreement patient should it was utilize 1 pharmacy and not receiving narcotic from any other provider patient verbalizes understanding that breaching the contract will affect future prescription decision making and critical results in the dismissal from the practice if specifically the has been explained in understood to patient patient demonstrates following informed use of appropriate medicine to analgesia increase activity explained the side effects recommended that thisto the patient patient understands that also advised that appeared to expose him order an abuse and addiction and overdose counseling provided for prevention of any overuse abuse or addiction

## 2024-01-17 ENCOUNTER — OFFICE VISIT (OUTPATIENT)
Dept: INTERNAL MEDICINE CLINIC | Facility: CLINIC | Age: 59
End: 2024-01-17
Payer: COMMERCIAL

## 2024-01-17 VITALS
SYSTOLIC BLOOD PRESSURE: 126 MMHG | TEMPERATURE: 98 F | HEIGHT: 71 IN | WEIGHT: 249 LBS | HEART RATE: 80 BPM | BODY MASS INDEX: 34.86 KG/M2 | DIASTOLIC BLOOD PRESSURE: 80 MMHG | OXYGEN SATURATION: 98 %

## 2024-01-17 DIAGNOSIS — I10 BENIGN ESSENTIAL HYPERTENSION: Primary | ICD-10-CM

## 2024-01-17 DIAGNOSIS — M51.26 LUMBAR DISC HERNIATION: ICD-10-CM

## 2024-01-17 DIAGNOSIS — G62.9 NEUROPATHY: ICD-10-CM

## 2024-01-17 DIAGNOSIS — N40.0 BENIGN PROSTATIC HYPERPLASIA, UNSPECIFIED WHETHER LOWER URINARY TRACT SYMPTOMS PRESENT: ICD-10-CM

## 2024-01-17 DIAGNOSIS — M51.36 DDD (DEGENERATIVE DISC DISEASE), LUMBAR: ICD-10-CM

## 2024-01-17 DIAGNOSIS — M48.061 LUMBAR FORAMINAL STENOSIS: ICD-10-CM

## 2024-01-17 DIAGNOSIS — F11.20 CONTINUOUS OPIOID DEPENDENCE (HCC): ICD-10-CM

## 2024-01-17 PROCEDURE — 99214 OFFICE O/P EST MOD 30 MIN: CPT | Performed by: INTERNAL MEDICINE

## 2024-01-17 RX ORDER — AMLODIPINE AND OLMESARTAN MEDOXOMIL 5; 40 MG/1; MG/1
1 TABLET ORAL DAILY
Qty: 90 TABLET | Refills: 2 | Status: SHIPPED | OUTPATIENT
Start: 2024-01-17

## 2024-01-17 RX ORDER — CELECOXIB 200 MG/1
200 CAPSULE ORAL DAILY
Qty: 90 CAPSULE | Refills: 2 | Status: SHIPPED | OUTPATIENT
Start: 2024-01-17

## 2024-01-17 RX ORDER — PREGABALIN 100 MG/1
100 CAPSULE ORAL 2 TIMES DAILY
Qty: 180 CAPSULE | Refills: 2 | Status: SHIPPED | OUTPATIENT
Start: 2024-01-17

## 2024-01-17 RX ORDER — OXYCODONE AND ACETAMINOPHEN 10; 325 MG/1; MG/1
1 TABLET ORAL EVERY 8 HOURS PRN
Qty: 90 TABLET | Refills: 0 | Status: SHIPPED | OUTPATIENT
Start: 2024-01-17

## 2024-01-17 NOTE — ASSESSMENT & PLAN NOTE
Much improved symptoms controlled continue Lyrica and Mirapex    Symptoms controlled with medication as follows agree and continue medication as follows  Lyrica 100 mg at bedtime  Mirapex at bedtime

## 2024-01-17 NOTE — PROGRESS NOTES
Dr. Bradford's Office Visit Note  24     Ifeanyi Puga 58 y.o. male MRN: [id number]  : 1965    Assessment:     1. Benign essential hypertension  Assessment & Plan:  Blood pressure controlled asymptomatic    Agree and continue medication as follows    Maxide 37.5/25 daily  Soni 5/41 a day    Orders:  -     amlodipine-olmesartan (Soni) 5-40 MG; Take 1 tablet by mouth daily    2. Neuropathy  Assessment & Plan:  Much improved symptoms controlled continue Lyrica and Mirapex    Symptoms controlled with medication as follows agree and continue medication as follows  Lyrica 100 mg at bedtime  Mirapex at bedtime    Orders:  -     pregabalin (LYRICA) 100 mg capsule; Take 1 capsule (100 mg total) by mouth 2 (two) times a day    3. Benign prostatic hyperplasia, unspecified whether lower urinary tract symptoms present  Assessment & Plan:  Persistent symptoms of frequency on Flomax awaiting cystoscopy to be done     Follow-up with the urology continue Flomax 0.4 daily at bedtime    Orders:  -     celecoxib (CeleBREX) 200 mg capsule; Take 1 capsule (200 mg total) by mouth daily    4. DDD (degenerative disc disease), lumbar  Assessment & Plan:  Prescribed Percocet 10/325 3 times a day advised to be seen by pain management stable    Orders:  -     oxyCODONE-acetaminophen (PERCOCET)  mg per tablet; Take 1 tablet by mouth every 8 (eight) hours as needed for severe pain Max Daily Amount: 3 tablets    5. Lumbar disc herniation  Assessment & Plan:  Percocet 10-3 25 3 times a day refilled          Percocet 10/325 4 times a day was refilled  All medical records reviewed and reconciled patient PDMP database reviewed to ensure compliant with the treatment plan for pain management benefits due to the fact that patient has not responded to other measures for pain control so far and severe the pain is significant and interfering with normal daily activities I believe it is reasonable and appropriate to continue the  controlled substance in order to improve his ability to function in enhance quality of life the patient has signed a narcotic agreement patient should it was utilize 1 pharmacy and not receiving narcotic from any other provider patient verbalizes understanding that breaching the contract will affect future prescription decision making and critical results in the dismissal from the practice if specifically the has been explained in understood to patient patient demonstrates following informed use of appropriate medicine to analgesia increase activity explained the side effects recommended that thisto the patient patient understands that also advised that appeared to expose him order an abuse and addiction and overdose counseling provided for prevention of any overuse abuse or addiction    Orders:  -     oxyCODONE-acetaminophen (PERCOCET)  mg per tablet; Take 1 tablet by mouth every 8 (eight) hours as needed for severe pain Max Daily Amount: 3 tablets    6. Lumbar foraminal stenosis  Assessment & Plan:  Same as under DJD lumbar spine    Orders:  -     oxyCODONE-acetaminophen (PERCOCET)  mg per tablet; Take 1 tablet by mouth every 8 (eight) hours as needed for severe pain Max Daily Amount: 3 tablets    7. Continuous opioid dependence (HCC)  Assessment & Plan:      The pain contract renewed drug screen reviewed no evidence of any overuse abuse addiction no evidence of any overuse abuse addiction remaining follow-up plan finding counseling as follows from a previous progress note patient has a Narcan at home      The follow-up plan finding counseling as follows from a previous progress note  All medical records reviewed and reconciled patient PDMP database reviewed to ensure compliant with the treatment plan for pain management benefits due to the fact that patient has not responded to other measures for pain control so far and severe the pain is significant and interfering with normal daily activities I  believe it is reasonable and appropriate to continue the controlled substance in order to improve his ability to function in enhance quality of life the patient has signed a narcotic agreement patient should it was utilize 1 pharmacy and not receiving narcotic from any other provider patient verbalizes understanding that breaching the contract will affect future prescription decision making and critical results in the dismissal from the practice if specifically the has been explained in understood to patient patient demonstrates following informed use of appropriate medicine to analgesia increase activity explained the side effects recommended that thisto the patient patient understands that also advised that appeared to expose him order an abuse and addiction and overdose counseling provided for prevention of any overuse abuse or addiction            Discussion Summary and Plan:  Today's care plan and medications were reviewed with patient in detail and all their questions answered to their satisfaction.    Chief Complaint   Patient presents with   • Follow-up     Lab work was done.      Subjective:  Patient came in follow-up chronic medical condition listed under visit diagnosis and refill for Percocet 10-3 25 4 times a day this regimen helping patient control and function and work full-time awaiting cystoscopy for persistent symptoms of pain during urination and frequency        The following portions of the patient's history were reviewed and updated as appropriate: allergies, current medications, past family history, past medical history, past social history, past surgical history and problem list.    Review of Systems   Constitutional:  Negative for activity change, appetite change, chills, diaphoresis, fatigue, fever and unexpected weight change.   HENT:  Negative for dental problem, drooling, ear discharge, ear pain, facial swelling, hearing loss, mouth sores, nosebleeds, postnasal drip, rhinorrhea, sinus  pressure, sneezing, sore throat, tinnitus, trouble swallowing and voice change.    Eyes:  Negative for photophobia, pain, discharge, redness, itching and visual disturbance.   Respiratory:  Negative for apnea, cough, choking, chest tightness, shortness of breath, wheezing and stridor.    Cardiovascular:  Negative for chest pain, palpitations and leg swelling.   Gastrointestinal:  Negative for abdominal distention, abdominal pain, anal bleeding, blood in stool, constipation, diarrhea, nausea, rectal pain and vomiting.   Endocrine: Negative for cold intolerance, heat intolerance, polydipsia, polyphagia and polyuria.   Genitourinary:  Positive for dysuria and frequency. Negative for decreased urine volume, difficulty urinating, enuresis, flank pain, genital sores, hematuria and urgency.   Musculoskeletal:  Positive for arthralgias and back pain. Negative for neck pain and neck stiffness.   Skin:  Negative for color change, pallor, rash and wound.   Allergic/Immunologic: Negative.  Negative for environmental allergies, food allergies and immunocompromised state.   Neurological:  Negative for dizziness, tremors, seizures, syncope, facial asymmetry, speech difficulty, weakness, light-headedness, numbness and headaches.   Psychiatric/Behavioral:  Negative for agitation, behavioral problems, confusion, decreased concentration, dysphoric mood, hallucinations, self-injury, sleep disturbance and suicidal ideas. The patient is not nervous/anxious and is not hyperactive.          Historical Information   Patient Active Problem List   Diagnosis   • Benign essential hypertension   • Benign prostatic hyperplasia   • DDD (degenerative disc disease), lumbar   • Incomplete emptying of bladder   • Lumbar disc herniation   • Lumbar foraminal stenosis   • Neuropathy   • Chronic intractable pain   • Mild episode of recurrent major depressive disorder (HCC)   • Hematospermia   • Prostatitis   • Other male erectile dysfunction   • Annual  physical exam   • Primary osteoarthritis of both knees   • Chronic cough   • Non-seasonal allergic rhinitis due to pollen   • Continuous opioid dependence (HCC)   • Pain   • Swelling   • Intermittent claudication due to atherosclerosis of artery of extremity (HCC)   • Lumbar spondylosis   • Gastric bypass status for obesity   • Morbid obesity due to excess calories (HCC)   • Uncomplicated opioid dependence (HCC)   • Allergic reaction   • Prediabetes     Past Medical History:   Diagnosis Date   • Abdominal pain    • Anxiety disorder    • Arthritis    • Back pain    • Dizziness    • Headache    • Hypertension    • Lightheadedness    • Obesity    • Palpitations    • SOB (shortness of breath)      Past Surgical History:   Procedure Laterality Date   • CHOLECYSTECTOMY     • GASTRIC BYPASS       Social History     Substance and Sexual Activity   Alcohol Use None    Comment: none per Bailey     Social History     Substance and Sexual Activity   Drug Use Not on file    Comment: none per Trish     Social History     Tobacco Use   Smoking Status Never   Smokeless Tobacco Never     No family history on file.  Health Maintenance Due   Topic   • Hepatitis C Screening    • HIV Screening    • Zoster Vaccine (1 of 2)   • COVID-19 Vaccine (7 - 2023-24 season)   • Annual Physical       Meds/Allergies       Current Outpatient Medications:   •  amlodipine-olmesartan (Soni) 5-40 MG, Take 1 tablet by mouth daily, Disp: 90 tablet, Rfl: 2  •  celecoxib (CeleBREX) 200 mg capsule, Take 1 capsule (200 mg total) by mouth daily, Disp: 90 capsule, Rfl: 2  •  cetirizine (ZyrTEC) 10 mg tablet, Take 1 tablet (10 mg total) by mouth daily, Disp: 30 tablet, Rfl: 2  •  escitalopram (LEXAPRO) 20 mg tablet, Take 1 tablet (20 mg total) by mouth daily, Disp: 90 tablet, Rfl: 0  •  fluticasone (FLONASE) 50 mcg/act nasal spray, 1 spray into each nostril daily, Disp: 18.2 mL, Rfl: 2  •  oxyCODONE-acetaminophen (PERCOCET)  mg per tablet, Take 1 tablet by  "mouth every 8 (eight) hours as needed for severe pain Max Daily Amount: 3 tablets, Disp: 90 tablet, Rfl: 0  •  pramipexole (MIRAPEX) 0.5 mg tablet, Take 1 tablet (0.5 mg total) by mouth daily at bedtime, Disp: 30 tablet, Rfl: 5  •  pregabalin (LYRICA) 100 mg capsule, Take 1 capsule (100 mg total) by mouth 2 (two) times a day, Disp: 180 capsule, Rfl: 2  •  sildenafil (Viagra) 100 mg tablet, Take 1 tablet (100 mg total) by mouth daily as needed for erectile dysfunction, Disp: 10 tablet, Rfl: 55  •  tamsulosin (FLOMAX) 0.4 mg, Take 1 capsule (0.4 mg total) by mouth daily with dinner, Disp: 90 capsule, Rfl: 2  •  triamterene-hydrochlorothiazide (MAXZIDE-25) 37.5-25 mg per tablet, Take 1 tablet by mouth daily, Disp: 90 tablet, Rfl: 2  •  neomycin-polymyxin-hydrocortisone (CORTISPORIN) 0.35%-10,000 units/mL-1% otic suspension, Administer 3 drops into the left ear 4 (four) times a day for 7 days, Disp: 10 mL, Rfl: 0      Objective:    Vitals:   /80   Pulse 80   Temp 98 °F (36.7 °C)   Ht 5' 11\" (1.803 m)   Wt 113 kg (249 lb)   SpO2 98%   BMI 34.73 kg/m²   Body mass index is 34.73 kg/m².  Vitals:    01/17/24 1149   Weight: 113 kg (249 lb)       Physical Exam  Vitals and nursing note reviewed.   Constitutional:       General: He is not in acute distress.     Appearance: He is well-developed. He is obese. He is not ill-appearing, toxic-appearing or diaphoretic.   HENT:      Head: Normocephalic and atraumatic.      Right Ear: External ear normal.      Left Ear: External ear normal.      Nose: Nose normal.      Mouth/Throat:      Pharynx: No oropharyngeal exudate.   Eyes:      General: Lids are normal. Lids are everted, no foreign bodies appreciated. No scleral icterus.        Right eye: No discharge.         Left eye: No discharge.      Conjunctiva/sclera: Conjunctivae normal.      Pupils: Pupils are equal, round, and reactive to light.   Neck:      Thyroid: No thyromegaly.      Vascular: Normal carotid pulses. No " carotid bruit, hepatojugular reflux or JVD.      Trachea: No tracheal tenderness or tracheal deviation.   Cardiovascular:      Rate and Rhythm: Normal rate and regular rhythm.      Pulses: Normal pulses.      Heart sounds: Normal heart sounds. No murmur heard.     No friction rub. No gallop.   Pulmonary:      Effort: Pulmonary effort is normal. No respiratory distress.      Breath sounds: Normal breath sounds. No stridor. No wheezing or rales.   Chest:      Chest wall: No tenderness.   Abdominal:      General: Bowel sounds are normal. There is no distension.      Palpations: Abdomen is soft. There is no mass.      Tenderness: There is no abdominal tenderness. There is no guarding or rebound.   Musculoskeletal:         General: No tenderness or deformity. Normal range of motion.      Cervical back: Normal range of motion and neck supple. No edema, erythema or rigidity. No spinous process tenderness or muscular tenderness. Normal range of motion.   Lymphadenopathy:      Head:      Right side of head: No submental, submandibular, tonsillar, preauricular or posterior auricular adenopathy.      Left side of head: No submental, submandibular, tonsillar, preauricular, posterior auricular or occipital adenopathy.      Cervical: No cervical adenopathy.      Right cervical: No superficial, deep or posterior cervical adenopathy.     Left cervical: No superficial, deep or posterior cervical adenopathy.      Upper Body:      Right upper body: No pectoral adenopathy.      Left upper body: No pectoral adenopathy.   Skin:     General: Skin is warm and dry.      Coloration: Skin is not pale.      Findings: No erythema or rash.   Neurological:      General: No focal deficit present.      Mental Status: He is alert and oriented to person, place, and time.      Cranial Nerves: No cranial nerve deficit.      Sensory: No sensory deficit.      Motor: No tremor, abnormal muscle tone or seizure activity.      Coordination: Coordination  normal.      Gait: Gait normal.      Deep Tendon Reflexes: Reflexes are normal and symmetric. Reflexes normal.   Psychiatric:         Behavior: Behavior normal.         Thought Content: Thought content normal.         Judgment: Judgment normal.         Lab Review   No visits with results within 2 Month(s) from this visit.   Latest known visit with results is:   Office Visit on 09/20/2023   Component Date Value Ref Range Status   • RESULT ALL_PRESC MEDS SP INSTRNS 09/20/2023 Not Applicable   Final    Comment:  4-ANPP: Fentanyl Negative. Acetyl fentanyl: Fentanyl Negative. Acetyl norfentan  yl: Fentanyl Negative. Acryl fentanyl: Fentanyl Negative. Carfentanil: Fentanyl   Negative. Para-fluorofentanyl: Fentanyl Negative.     • Amphetamine Quantification 09/20/2023 negative  100 ng/mL Final   • Methamphetamine Quantification 09/20/2023 negative  100 ng/mL Final   • Butalbital Quantification 09/20/2023 negative  200 ng/mL Final   • Phenobarbital Quantification 09/20/2023 negative  200 ng/mL Final   • Secobarbital Quantification 09/20/2023 negative  200 ng/mL Final   • Alpha-Hydroxyalprazolam Quantifica* 09/20/2023 positive-573.198-I (A)  20 ng/mL Final   • 7-Amino-Clonazepam Quantification * 09/20/2023 negative  20 ng/mL Final   • Nordiazepam Quantification 09/20/2023 negative  40 ng/mL Final   • Temazepam Quantification 09/20/2023 negative  50 ng/mL Final   • Oxazepam Quantification 09/20/2023 negative  40 ng/mL Final   • Lorazepam Quantification 09/20/2023 negative  40 ng/mL Final   • Gabapentin Quantification 09/20/2023 negative  1000 Final   • PREGABALIN QUANTIFICATION 09/20/2023 negative-I (A)  400 Final   • Cocaine metabolite Quantification 09/20/2023 negative  50 ng/mL Final   • 6-LEEANN (Heroin metabolite) Quantifi* 09/20/2023 negative  10 ng/mL Final   • Buprenorphine Quantification 09/20/2023 negative  5 ng/mL Final   • Norbuprenorphine Quantification 09/20/2023 negative  20 ng/mL Final   • Dextrorphan  (Dextromethorphan meta* 09/20/2023 negative  50 ng/mL Final   • Meperidine Quantification 09/20/2023 negative  50 ng/mL Final   • Normeperidine Quantification 09/20/2023 negative  50 ng/mL Final   • Naltrexone Quantification 09/20/2023 negative  10 ng/mL Final   • NALTREXOL (NALTREXONE METABOLITE) * 09/20/2023 negative  10 ng/mL Final   • Fentanyl Quantification 09/20/2023 negative  1 ng/mL Final   • Norfentanyl Quantification 09/20/2023 negative  8 ng/mL Final   • 4-ANPP Quantification 09/20/2023 Fen Neg  2 ng/mL Final   • Acetyl fentanyl Quantification 09/20/2023 Fen Neg  2 ng/mL Final   • Acetyl norfentanyl Quantification 09/20/2023 Fen Neg  5 ng/mL Final   • Acryl fentanyl Quantification 09/20/2023 Fen Neg  1 ng/mL Final   • Carfentanil Quantification 09/20/2023 Fen Neg  2 ng/mL Final   • Para-fluorofentanyl Quantification 09/20/2023 Fen Neg  1 ng/mL Final   • Methadone Quantification 09/20/2023 negative  100 ng/mL Final   • EDDP (Methadone metabolite) Quanti* 09/20/2023 negative  100 ng/mL Final   • Oxycodone Quantification 09/20/2023 positive-4612.905  50 ng/mL Final   • Noroxycodone Quantification 09/20/2023 positive-5244.238  50 ng/mL Final   • Oxymorphone Quantification 09/20/2023 positive-6534.695  50 ng/mL Final   • Tapentadol Quantification 09/20/2023 negative  50 ng/mL Final   • cTHC (Marijuana metabolite) Quanti* 09/20/2023 negative  15 ng/mL Final   • Tramadol Quantification 09/20/2023 negative  100 ng/mL Final   • O-desmethyl-tramadol Quantification 09/20/2023 negative  100 ng/mL Final   • N-DESMETHYL-TRAMADOL QUANTIFICATION 09/20/2023 negative  100 ng/mL Final   • Codeine Quantification 09/20/2023 negative  50 ng/mL Final   • Morphine Quantification 09/20/2023 negative  50 ng/mL Final   • Hydrocodone Quantification 09/20/2023 negative  50 ng/mL Final   • Norhydrocodone Quantification 09/20/2023 negative  50 ng/mL Final   • Hydromorphone Quantification 09/20/2023 negative  50 ng/mL Final   • EUTYLONE  QUANTIFICATION 09/20/2023 negative  10 ng/mL Final   • METHYLONE QUANTIFICATION 09/20/2023 negative  3 ng/mL Final   • Ethyl Glucuronide Quantification 09/20/2023 negative  500 ng/mL Final   • Ethyl Sulfate Quantification 09/20/2023 negative  500 ng/mL Final   • Mitragynine (Kratom alkaloid) Jemal* 09/20/2023 negative  1 ng/mL Final   • 0-GK-Xruqisyuxdn (Kratom alkaloid)* 09/20/2023 negative  1 ng/mL Final   • 5F-ADB-M7 09/20/2023 negative  10 ng/mL Final   • ZT-KHQUAWKF-A3 METABOLITE QUANTIFI* 09/20/2023 negative  10 ng/mL Final   • FBIJ-OCXLUFLC-K9 METABOLITE QUANTI* 09/20/2023 negative  10 ng/mL Final   • LIB127 metabolite Quantification 09/20/2023 negative  10 ng/mL Final   • JRD305 metabolite Quantification 09/20/2023 negative  10 ng/mL Final   • RCS4 METABOLITE QUANTIFICATION 09/20/2023 negative  10 ng/mL Final   • XLR11/ METABOLITE QUANTIFICAT* 09/20/2023 negative  10 ng/mL Final   • Methylphenidate Quantification 09/20/2023 negative  50 ng/mL Final   • RITALINIC ACID QUANTIFICATION 09/20/2023 negative  50 ng/mL Final   • PHENTERMINE QUANTIFICATION 09/20/2023 negative  50 ng/mL Final   • OXIDANT 09/20/2023 normal-0  <200 ug/mL ug/mL Final   • CREATININE 09/20/2023 normal-138.1  >20 mg/dL mg/dL Final   • pH 09/20/2023 normal-5.5  4.5 - 9.5 Final   • SPECIFIC GRAVITY URINE 09/20/2023 normal-1.015  1.003 - 1.035 Final         Patient Instructions   Chronic Pain   WHAT YOU NEED TO KNOW:   Chronic pain is pain that does not get better for 3 months or longer. Chronic pain may hurt all the time, or come and go.  DISCHARGE INSTRUCTIONS:   Call your local emergency number, or have someone else call (911 in the [place]) if:   You are breathing slower than normal, or you have trouble breathing.    You cannot be awakened.    You have a seizure.    Call your doctor if:   Your heart feels like it is jumping or fluttering.    You cannot think clearly.    You have side effects from prescription pain medicine, such as itching,  nausea, or vomiting.    You have trouble sleeping.    Your pain gets worse, even after you take medicine.    You don't think the medicine is working.    You have questions or concerns about your condition or care.    Medicines:  You may need any of the following:  Acetaminophen  decreases pain and fever. It is available without a doctor's order. Ask how much to take and how often to take it. Follow directions. Read the labels of all other medicines you are using to see if they also contain acetaminophen, or ask your doctor or pharmacist. Acetaminophen can cause liver damage if not taken correctly.    NSAIDs , such as ibuprofen, help decrease swelling, pain, and fever. This medicine is available with or without a doctor's order. NSAIDs can cause stomach bleeding or kidney problems in certain people. If you take blood thinner medicine, always ask your healthcare provider if NSAIDs are safe for you. Always read the medicine label and follow directions.    Prescription pain medicine  called narcotics or opioids may be given for certain types of chronic pain. Ask your healthcare provider how to take this medicine safely.    Anesthetics  can be rubbed on your skin or injected into a nerve or muscle to numb an area.    Other medicines  may reduce pain, anxiety, muscle tension, or swelling.    Take your medicine as directed.  Contact your healthcare provider if you think your medicine is not helping or if you have side effects. Tell your provider if you are allergic to any medicine. Keep a list of the medicines, vitamins, and herbs you take. Include the amounts, and when and why you take them. Bring the list or the pill bottles to follow-up visits. Carry your medicine list with you in case of an emergency.    Manage your chronic pain:   Apply heat  on the area in pain for 20 to 30 minutes every 2 hours for as many days as directed. Heat helps decrease pain and muscle spasms.    Apply ice  on the part of your body that hurts  for 15 to 20 minutes every hour or as directed. Use an ice pack, or put crushed ice in a plastic bag. Cover it with a towel. Ice decreases pain and swelling, and helps prevent tissue damage.    Go to physical therapy as directed.  A physical therapist teaches you exercises to help improve movement and strength, and to decrease pain.    Exercise for 30 minutes, 3 times a week.  Regular physical activity can help decrease pain and improve your quality of life. Ask your healthcare provider about the best exercise plan for your type of pain.    Get enough sleep.  Create a relaxing bedtime routine. Go to sleep and wake up at the same time every day. Avoid caffeine in the afternoon.    Talk with a counselor or therapist.  A type of counseling called cognitive behavioral therapy (CBT) can help your chronic pain by changing the way you think about it. CBT can also improve your mood, sleep, and ability to move.    What you must know if you take narcotic pain medicine:   You may need to take a bowel movement softener.  The most common side effect of prescription pain medicine is constipation. Bowel movement softeners are available over the counter.    Do not mix prescription pain medicines.  This can cause an overdose of medicine, which can become life-threatening. Read labels. Make sure you know the ingredients in all of your medicines.    Do not drink alcohol  when you take prescription pain medicine. It is not safe to mix narcotics or opioids with alcohol or illegal drugs.    Prescription pain medicine may impair your ability to drive or work safely.  They may also cause dizziness and increase your risk for falling.    Store prescription pain medicine in a safe location at home.  Keep your medicine away from children and other people. Never share your medicine with anyone.    Follow up with your doctor as directed:  You may be referred to a pain specialist. Write down your questions so you remember to ask them during your  "visits.  © Copyright [employer] 2023 Information is for End User's use only and may not be sold, redistributed or otherwise used for commercial purposes.  The above information is an  only. It is not intended as medical advice for individual conditions or treatments. Talk to your doctor, nurse or pharmacist before following any medical regimen to see if it is safe and effective for you.       Wilmar Bradford MD        \"This note has been constructed using a voice recognition system.Therefore there may be syntax, spelling, and/or grammatical errors. Please call if you have any questions. \"  "

## 2024-01-17 NOTE — ASSESSMENT & PLAN NOTE
BMI 34.73 counseling done to lose weight patient tried diet exercise change in lifestyle no success not able to lose weight history of gastric bypass almost 27 years ago discussed Wegovy injection at length patient is has a comorbid condition including hyperlipidemia hypertension  BMI Counseling:      The BMI is above normal. Know Body weight goal    Weigh yourself daily or weekly as per your doctor    Nutrition recommendations include decreasing portion sizes, encouraging healthy choices of fruits and vegetables, decreasing     fast food intake, consuming healthier snacks, limiting drinks that contain sugar, moderation in carbohydrate intake, increasing     intake of lean protein, reducing intake of saturated and trans fat and reducing intake of cholesterol  Discussed options of HealthyCORE-Intensive Lifestyle Intervention Program, Very Low Calorie Diet-VLCD and Conservative Program and the role of weight loss medications.  - Explained the importance of making lifestyle changes in addition to starting anti-obesity medications.   -

## 2024-01-17 NOTE — ASSESSMENT & PLAN NOTE
Blood pressure controlled asymptomatic    Agree and continue medication as follows    Maxide 37.5/25 daily  Soni 5/41 a day

## 2024-01-17 NOTE — ASSESSMENT & PLAN NOTE
Persistent symptoms of frequency on Flomax awaiting cystoscopy to be done February 26    Follow-up with the urology continue Flomax 0.4 daily at bedtime

## 2024-01-17 NOTE — ASSESSMENT & PLAN NOTE
Percocet 10-3 25 3 times a day refilled          Percocet 10/325 4 times a day was refilled  All medical records reviewed and reconciled patient PDMP database reviewed to ensure compliant with the treatment plan for pain management benefits due to the fact that patient has not responded to other measures for pain control so far and severe the pain is significant and interfering with normal daily activities I believe it is reasonable and appropriate to continue the controlled substance in order to improve his ability to function in enhance quality of life the patient has signed a narcotic agreement patient should it was utilize 1 pharmacy and not receiving narcotic from any other provider patient verbalizes understanding that breaching the contract will affect future prescription decision making and critical results in the dismissal from the practice if specifically the has been explained in understood to patient patient demonstrates following informed use of appropriate medicine to analgesia increase activity explained the side effects recommended that thisto the patient patient understands that also advised that appeared to expose him order an abuse and addiction and overdose counseling provided for prevention of any overuse abuse or addiction

## 2024-01-17 NOTE — ASSESSMENT & PLAN NOTE
The pain contract renewed drug screen reviewed no evidence of any overuse abuse addiction no evidence of any overuse abuse addiction remaining follow-up plan finding counseling as follows from a previous progress note patient has a Narcan at home      The follow-up plan finding counseling as follows from a previous progress note  All medical records reviewed and reconciled patient PDMP database reviewed to ensure compliant with the treatment plan for pain management benefits due to the fact that patient has not responded to other measures for pain control so far and severe the pain is significant and interfering with normal daily activities I believe it is reasonable and appropriate to continue the controlled substance in order to improve his ability to function in enhance quality of life the patient has signed a narcotic agreement patient should it was utilize 1 pharmacy and not receiving narcotic from any other provider patient verbalizes understanding that breaching the contract will affect future prescription decision making and critical results in the dismissal from the practice if specifically the has been explained in understood to patient patient demonstrates following informed use of appropriate medicine to analgesia increase activity explained the side effects recommended that thisto the patient patient understands that also advised that appeared to expose him order an abuse and addiction and overdose counseling provided for prevention of any overuse abuse or addiction

## 2024-01-17 NOTE — PATIENT INSTRUCTIONS
Chronic Pain   WHAT YOU NEED TO KNOW:   Chronic pain is pain that does not get better for 3 months or longer. Chronic pain may hurt all the time, or come and go.  DISCHARGE INSTRUCTIONS:   Call your local emergency number, or have someone else call (911 in the [place]) if:   You are breathing slower than normal, or you have trouble breathing.    You cannot be awakened.    You have a seizure.    Call your doctor if:   Your heart feels like it is jumping or fluttering.    You cannot think clearly.    You have side effects from prescription pain medicine, such as itching, nausea, or vomiting.    You have trouble sleeping.    Your pain gets worse, even after you take medicine.    You don't think the medicine is working.    You have questions or concerns about your condition or care.    Medicines:  You may need any of the following:  Acetaminophen  decreases pain and fever. It is available without a doctor's order. Ask how much to take and how often to take it. Follow directions. Read the labels of all other medicines you are using to see if they also contain acetaminophen, or ask your doctor or pharmacist. Acetaminophen can cause liver damage if not taken correctly.    NSAIDs , such as ibuprofen, help decrease swelling, pain, and fever. This medicine is available with or without a doctor's order. NSAIDs can cause stomach bleeding or kidney problems in certain people. If you take blood thinner medicine, always ask your healthcare provider if NSAIDs are safe for you. Always read the medicine label and follow directions.    Prescription pain medicine  called narcotics or opioids may be given for certain types of chronic pain. Ask your healthcare provider how to take this medicine safely.    Anesthetics  can be rubbed on your skin or injected into a nerve or muscle to numb an area.    Other medicines  may reduce pain, anxiety, muscle tension, or swelling.    Take your medicine as directed.  Contact your healthcare provider if  you think your medicine is not helping or if you have side effects. Tell your provider if you are allergic to any medicine. Keep a list of the medicines, vitamins, and herbs you take. Include the amounts, and when and why you take them. Bring the list or the pill bottles to follow-up visits. Carry your medicine list with you in case of an emergency.    Manage your chronic pain:   Apply heat  on the area in pain for 20 to 30 minutes every 2 hours for as many days as directed. Heat helps decrease pain and muscle spasms.    Apply ice  on the part of your body that hurts for 15 to 20 minutes every hour or as directed. Use an ice pack, or put crushed ice in a plastic bag. Cover it with a towel. Ice decreases pain and swelling, and helps prevent tissue damage.    Go to physical therapy as directed.  A physical therapist teaches you exercises to help improve movement and strength, and to decrease pain.    Exercise for 30 minutes, 3 times a week.  Regular physical activity can help decrease pain and improve your quality of life. Ask your healthcare provider about the best exercise plan for your type of pain.    Get enough sleep.  Create a relaxing bedtime routine. Go to sleep and wake up at the same time every day. Avoid caffeine in the afternoon.    Talk with a counselor or therapist.  A type of counseling called cognitive behavioral therapy (CBT) can help your chronic pain by changing the way you think about it. CBT can also improve your mood, sleep, and ability to move.    What you must know if you take narcotic pain medicine:   You may need to take a bowel movement softener.  The most common side effect of prescription pain medicine is constipation. Bowel movement softeners are available over the counter.    Do not mix prescription pain medicines.  This can cause an overdose of medicine, which can become life-threatening. Read labels. Make sure you know the ingredients in all of your medicines.    Do not drink alcohol   when you take prescription pain medicine. It is not safe to mix narcotics or opioids with alcohol or illegal drugs.    Prescription pain medicine may impair your ability to drive or work safely.  They may also cause dizziness and increase your risk for falling.    Store prescription pain medicine in a safe location at home.  Keep your medicine away from children and other people. Never share your medicine with anyone.    Follow up with your doctor as directed:  You may be referred to a pain specialist. Write down your questions so you remember to ask them during your visits.  © Copyright [employer] 2023 Information is for End User's use only and may not be sold, redistributed or otherwise used for commercial purposes.  The above information is an  only. It is not intended as medical advice for individual conditions or treatments. Talk to your doctor, nurse or pharmacist before following any medical regimen to see if it is safe and effective for you.

## 2024-02-19 DIAGNOSIS — M48.061 LUMBAR FORAMINAL STENOSIS: ICD-10-CM

## 2024-02-19 DIAGNOSIS — M51.36 DDD (DEGENERATIVE DISC DISEASE), LUMBAR: ICD-10-CM

## 2024-02-19 DIAGNOSIS — M51.26 LUMBAR DISC HERNIATION: ICD-10-CM

## 2024-02-19 RX ORDER — OXYCODONE AND ACETAMINOPHEN 10; 325 MG/1; MG/1
1 TABLET ORAL EVERY 8 HOURS PRN
Qty: 90 TABLET | Refills: 0 | Status: SHIPPED | OUTPATIENT
Start: 2024-02-19 | End: 2024-02-21 | Stop reason: SDUPTHER

## 2024-02-19 NOTE — TELEPHONE ENCOUNTER
Wife called about refill needed for , but was uncertain of medication.  Asked her to call me back after she speaks with him.

## 2024-02-19 NOTE — TELEPHONE ENCOUNTER
Medication Refill Request     Name oxyCODONE-acetaminophen (PERCOCET)  mg per tablet    Dose/Frequency 1 tablet daily as needed  Quantity 90  Verified pharmacy   [x]  Verified ordering Provider   [x]  Does patient have enough for the next 3 days? Yes [] No [x]

## 2024-02-21 ENCOUNTER — OFFICE VISIT (OUTPATIENT)
Dept: INTERNAL MEDICINE CLINIC | Facility: CLINIC | Age: 59
End: 2024-02-21
Payer: COMMERCIAL

## 2024-02-21 VITALS
BODY MASS INDEX: 34.86 KG/M2 | SYSTOLIC BLOOD PRESSURE: 136 MMHG | HEIGHT: 71 IN | HEART RATE: 90 BPM | WEIGHT: 249 LBS | OXYGEN SATURATION: 94 % | DIASTOLIC BLOOD PRESSURE: 84 MMHG

## 2024-02-21 DIAGNOSIS — M48.061 LUMBAR FORAMINAL STENOSIS: ICD-10-CM

## 2024-02-21 DIAGNOSIS — R73.03 PREDIABETES: ICD-10-CM

## 2024-02-21 DIAGNOSIS — I10 BENIGN ESSENTIAL HYPERTENSION: ICD-10-CM

## 2024-02-21 DIAGNOSIS — E66.01 MORBID OBESITY DUE TO EXCESS CALORIES (HCC): Primary | ICD-10-CM

## 2024-02-21 DIAGNOSIS — M51.36 DDD (DEGENERATIVE DISC DISEASE), LUMBAR: ICD-10-CM

## 2024-02-21 DIAGNOSIS — M51.26 LUMBAR DISC HERNIATION: ICD-10-CM

## 2024-02-21 DIAGNOSIS — Z13.0 SCREENING FOR DEFICIENCY ANEMIA: ICD-10-CM

## 2024-02-21 DIAGNOSIS — E78.2 MIXED HYPERLIPIDEMIA: ICD-10-CM

## 2024-02-21 DIAGNOSIS — Z98.84 GASTRIC BYPASS STATUS FOR OBESITY: ICD-10-CM

## 2024-02-21 DIAGNOSIS — E55.9 VITAMIN D DEFICIENCY: ICD-10-CM

## 2024-02-21 PROCEDURE — 99214 OFFICE O/P EST MOD 30 MIN: CPT | Performed by: INTERNAL MEDICINE

## 2024-02-21 RX ORDER — OXYCODONE AND ACETAMINOPHEN 10; 325 MG/1; MG/1
1 TABLET ORAL EVERY 8 HOURS PRN
Qty: 90 TABLET | Refills: 0 | Status: SHIPPED | OUTPATIENT
Start: 2024-02-21

## 2024-02-21 NOTE — ASSESSMENT & PLAN NOTE
Blood pressure controlled except for diastolic 84 asymptomatic    Continue monitoring blood pressure at home    Agree and continue management as follows    Maxide 37.5 daily once a day in the morning    Soni 5/41 a day    Patient asymptomatic

## 2024-02-21 NOTE — PROGRESS NOTES
Dr. Bradford's Office Visit Note  24     Ifeanyi Puga 58 y.o. male MRN: [id number]  : 1965    Assessment:     1. Morbid obesity due to excess calories (HCC)  Assessment & Plan:  BMI now 34.73 surgical hyperlipidemia hypertension counseling done very low calorie diet exercise to lose weight    BMI Counseling:      The BMI is above normal. Know Body weight goal    Weigh yourself daily or weekly as per your doctor    Nutrition recommendations include decreasing portion sizes, encouraging healthy choices of fruits and vegetables, decreasing     fast food intake, consuming healthier snacks, limiting drinks that contain sugar, moderation in carbohydrate intake, increasing     intake of lean protein, reducing intake of saturated and trans fat and reducing intake of cholesterol  Discussed options of HealthyCORE-Intensive Lifestyle Intervention Program, Very Low Calorie Diet-VLCD and Conservative Program and the role of weight loss medications.  - Explained the importance of making lifestyle changes in addition to starting anti-obesity medications.   -      Orders:  -     Comprehensive metabolic panel; Future    2. DDD (degenerative disc disease), lumbar  Assessment & Plan:  Same as under lumbar disc herniation    Orders:  -     oxyCODONE-acetaminophen (PERCOCET)  mg per tablet; Take 1 tablet by mouth every 8 (eight) hours as needed for severe pain Max Daily Amount: 3 tablets  -     Comprehensive metabolic panel; Future    3. Lumbar disc herniation  Assessment & Plan:  Percocet 10-3 25 3 times a day refilled this regimen helping patient carry out day-to-day activities total time remaining follow-up plan finding as follows from a previous progress          Percocet 10/325 4 times a day was refilled  All medical records reviewed and reconciled patient PDMP database reviewed to ensure compliant with the treatment plan for pain management benefits due to the fact that patient has not responded to other measures for  pain control so far and severe the pain is significant and interfering with normal daily activities I believe it is reasonable and appropriate to continue the controlled substance in order to improve his ability to function in enhance quality of life the patient has signed a narcotic agreement patient should it was utilize 1 pharmacy and not receiving narcotic from any other provider patient verbalizes understanding that breaching the contract will affect future prescription decision making and critical results in the dismissal from the practice if specifically the has been explained in understood to patient patient demonstrates following informed use of appropriate medicine to analgesia increase activity explained the side effects recommended that thisto the patient patient understands that also advised that appeared to expose him order an abuse and addiction and overdose counseling provided for prevention of any overuse abuse or addiction    Orders:  -     oxyCODONE-acetaminophen (PERCOCET)  mg per tablet; Take 1 tablet by mouth every 8 (eight) hours as needed for severe pain Max Daily Amount: 3 tablets  -     Comprehensive metabolic panel; Future    4. Lumbar foraminal stenosis  Assessment & Plan:  Same as under lumbar disc herniation    Orders:  -     oxyCODONE-acetaminophen (PERCOCET)  mg per tablet; Take 1 tablet by mouth every 8 (eight) hours as needed for severe pain Max Daily Amount: 3 tablets    5. Screening for deficiency anemia  -     CBC and differential; Future    6. Gastric bypass status for obesity  -     Comprehensive metabolic panel; Future    7. Benign essential hypertension  Assessment & Plan:  Blood pressure controlled except for diastolic 84 asymptomatic    Continue monitoring blood pressure at home    Agree and continue management as follows    Maxide 37.5 daily once a day in the morning    Soni 5/41 a day    Patient asymptomatic    Orders:  -     Comprehensive metabolic panel;  Future    8. Mixed hyperlipidemia  Assessment & Plan:  Patient last LDL reviewed mildly elevated    Repeat LDL before next visit    Until then continue low-fat low-cholesterol diet if needed start statin    Orders:  -     Comprehensive metabolic panel; Future  -     Lipid Panel with Direct LDL reflex; Future    9. Prediabetes  Assessment & Plan:  Lab Results   Component Value Date    HGBA1C 5.3 08/30/2022   Above reviewed borderline    Continue diabetic diet    I will check A1c before next visit    Orders:  -     Hemoglobin A1C; Future  -     Albumin / creatinine urine ratio; Future  -     Urinalysis with microscopic; Future    10. Vitamin D deficiency  -     Vitamin D 25 hydroxy; Future; Expected date: 03/21/2024          Discussion Summary and Plan:  Today's care plan and medications were reviewed with patient in detail and all their questions answered to their satisfaction.    Chief Complaint   Patient presents with   • Follow-up     Wants lab work done.      Subjective:  Patient can follow-up chronic medical condition listed visit diagnosis patient has urinary frequency especially at nighttime with some discomfort persistent symptoms for last 6 months underwent cystoscopy seen by urologist awaiting to be reevaluated by urologist and also came in for refill for Percocet 10-3 25 3 times a day for chronic intractable pain this regimen helping patient control pain so much able to function carry out day-to-day activities and function full-time goals given complete blood work denies any chest pain difficulty breathing        The following portions of the patient's history were reviewed and updated as appropriate: allergies, current medications, past family history, past medical history, past social history, past surgical history and problem list.    Review of Systems   Constitutional:  Positive for activity change. Negative for appetite change, chills, diaphoresis, fatigue, fever and unexpected weight change.   HENT:   Negative for congestion, dental problem, drooling, ear discharge, ear pain, facial swelling, hearing loss, mouth sores, nosebleeds, postnasal drip, rhinorrhea, sinus pressure, sneezing, sore throat, tinnitus, trouble swallowing and voice change.    Eyes:  Negative for photophobia, pain, discharge, redness, itching and visual disturbance.   Respiratory:  Negative for apnea, cough, choking, chest tightness, shortness of breath, wheezing and stridor.    Cardiovascular:  Negative for chest pain, palpitations and leg swelling.   Gastrointestinal:  Negative for abdominal distention, abdominal pain, anal bleeding, blood in stool, constipation, diarrhea, nausea, rectal pain and vomiting.   Endocrine: Negative for cold intolerance, heat intolerance, polydipsia, polyphagia and polyuria.   Genitourinary:  Positive for frequency. Negative for decreased urine volume, difficulty urinating, dysuria, enuresis, flank pain, genital sores, hematuria and urgency.   Musculoskeletal:  Positive for arthralgias, back pain, gait problem and myalgias. Negative for joint swelling, neck pain and neck stiffness.   Skin:  Negative for color change, pallor, rash and wound.   Allergic/Immunologic: Negative.  Negative for environmental allergies, food allergies and immunocompromised state.   Neurological:  Negative for dizziness, tremors, seizures, syncope, facial asymmetry, speech difficulty, weakness, light-headedness, numbness and headaches.   Psychiatric/Behavioral:  Negative for agitation, behavioral problems, confusion, decreased concentration, dysphoric mood, hallucinations, self-injury, sleep disturbance and suicidal ideas. The patient is not nervous/anxious and is not hyperactive.          Historical Information   Patient Active Problem List   Diagnosis   • Benign essential hypertension   • Benign prostatic hyperplasia   • DDD (degenerative disc disease), lumbar   • Incomplete emptying of bladder   • Lumbar disc herniation   • Lumbar  foraminal stenosis   • Neuropathy   • Chronic intractable pain   • Mild episode of recurrent major depressive disorder (HCC)   • Hematospermia   • Prostatitis   • Other male erectile dysfunction   • Annual physical exam   • Primary osteoarthritis of both knees   • Chronic cough   • Non-seasonal allergic rhinitis due to pollen   • Continuous opioid dependence (HCC)   • Pain   • Swelling   • Intermittent claudication due to atherosclerosis of artery of extremity (HCC)   • Lumbar spondylosis   • Gastric bypass status for obesity   • Morbid obesity due to excess calories (HCC)   • Uncomplicated opioid dependence (HCC)   • Allergic reaction   • Prediabetes   • Mixed hyperlipidemia     Past Medical History:   Diagnosis Date   • Abdominal pain    • Anxiety disorder    • Arthritis    • Back pain    • Dizziness    • Headache    • Hypertension    • Lightheadedness    • Obesity    • Palpitations    • SOB (shortness of breath)      Past Surgical History:   Procedure Laterality Date   • CHOLECYSTECTOMY     • GASTRIC BYPASS       Social History     Substance and Sexual Activity   Alcohol Use None    Comment: none per [place]     Social History     Substance and Sexual Activity   Drug Use Not on file    Comment: none per [place]     Social History     Tobacco Use   Smoking Status Never   Smokeless Tobacco Never     History reviewed. No pertinent family history.  Health Maintenance Due   Topic   • Hepatitis C Screening    • HIV Screening    • Zoster Vaccine (1 of 2)   • COVID-19 Vaccine (7 - 2023-24 season)   • Annual Physical       Meds/Allergies       Current Outpatient Medications:   •  amlodipine-olmesartan (Soni) 5-40 MG, Take 1 tablet by mouth daily, Disp: 90 tablet, Rfl: 2  •  celecoxib (CeleBREX) 200 mg capsule, Take 1 capsule (200 mg total) by mouth daily, Disp: 90 capsule, Rfl: 2  •  cetirizine (ZyrTEC) 10 mg tablet, Take 1 tablet (10 mg total) by mouth daily, Disp: 30 tablet, Rfl: 2  •  escitalopram (LEXAPRO) 20 mg tablet,  "Take 1 tablet (20 mg total) by mouth daily, Disp: 90 tablet, Rfl: 0  •  fluticasone (FLONASE) 50 mcg/act nasal spray, 1 spray into each nostril daily, Disp: 18.2 mL, Rfl: 2  •  oxyCODONE-acetaminophen (PERCOCET)  mg per tablet, Take 1 tablet by mouth every 8 (eight) hours as needed for severe pain Max Daily Amount: 3 tablets, Disp: 90 tablet, Rfl: 0  •  pramipexole (MIRAPEX) 0.5 mg tablet, Take 1 tablet (0.5 mg total) by mouth daily at bedtime, Disp: 30 tablet, Rfl: 5  •  pregabalin (LYRICA) 100 mg capsule, Take 1 capsule (100 mg total) by mouth 2 (two) times a day, Disp: 180 capsule, Rfl: 2  •  sildenafil (Viagra) 100 mg tablet, Take 1 tablet (100 mg total) by mouth daily as needed for erectile dysfunction, Disp: 10 tablet, Rfl: 55  •  tamsulosin (FLOMAX) 0.4 mg, Take 1 capsule (0.4 mg total) by mouth daily with dinner, Disp: 90 capsule, Rfl: 2  •  triamterene-hydrochlorothiazide (MAXZIDE-25) 37.5-25 mg per tablet, Take 1 tablet by mouth daily, Disp: 90 tablet, Rfl: 2  •  neomycin-polymyxin-hydrocortisone (CORTISPORIN) 0.35%-10,000 units/mL-1% otic suspension, Administer 3 drops into the left ear 4 (four) times a day for 7 days, Disp: 10 mL, Rfl: 0      Objective:    Vitals:   /84   Pulse 90   Ht 5' 11\" (1.803 m)   Wt 113 kg (249 lb)   SpO2 94%   BMI 34.73 kg/m²   Body mass index is 34.73 kg/m².  Vitals:    02/21/24 1511   Weight: 113 kg (249 lb)       Physical Exam  Constitutional:       General: He is not in acute distress.     Appearance: He is well-developed. He is not ill-appearing, toxic-appearing or diaphoretic.   HENT:      Head: Normocephalic and atraumatic.      Right Ear: External ear normal.      Left Ear: External ear normal.      Nose: Nose normal.      Mouth/Throat:      Pharynx: No oropharyngeal exudate.   Eyes:      General: Lids are normal. Lids are everted, no foreign bodies appreciated. No scleral icterus.        Right eye: No discharge.         Left eye: No discharge.      " Conjunctiva/sclera: Conjunctivae normal.      Pupils: Pupils are equal, round, and reactive to light.   Neck:      Thyroid: No thyromegaly.      Vascular: Normal carotid pulses. No carotid bruit, hepatojugular reflux or JVD.      Trachea: No tracheal tenderness or tracheal deviation.   Cardiovascular:      Rate and Rhythm: Normal rate and regular rhythm.      Pulses: Normal pulses.      Heart sounds: Normal heart sounds. No murmur heard.     No friction rub. No gallop.   Pulmonary:      Effort: Pulmonary effort is normal. No respiratory distress.      Breath sounds: Normal breath sounds. No stridor. No wheezing or rales.   Chest:      Chest wall: No tenderness.   Abdominal:      General: Bowel sounds are normal. There is no distension.      Palpations: Abdomen is soft. There is no mass.      Tenderness: There is no abdominal tenderness. There is no guarding or rebound.   Musculoskeletal:         General: No tenderness or deformity. Normal range of motion.      Cervical back: Normal range of motion and neck supple. No edema, erythema or rigidity. No spinous process tenderness or muscular tenderness. Normal range of motion.   Lymphadenopathy:      Head:      Right side of head: No submental, submandibular, tonsillar, preauricular or posterior auricular adenopathy.      Left side of head: No submental, submandibular, tonsillar, preauricular, posterior auricular or occipital adenopathy.      Cervical: No cervical adenopathy.      Right cervical: No superficial, deep or posterior cervical adenopathy.     Left cervical: No superficial, deep or posterior cervical adenopathy.      Upper Body:      Right upper body: No pectoral adenopathy.      Left upper body: No pectoral adenopathy.   Skin:     General: Skin is warm and dry.      Coloration: Skin is not pale.      Findings: No erythema or rash.   Neurological:      Mental Status: He is alert and oriented to person, place, and time.      Cranial Nerves: No cranial nerve  deficit.      Sensory: No sensory deficit.      Motor: No tremor, abnormal muscle tone or seizure activity.      Coordination: Coordination normal.      Gait: Gait normal.      Deep Tendon Reflexes: Reflexes are normal and symmetric. Reflexes normal.   Psychiatric:         Behavior: Behavior normal.         Thought Content: Thought content normal.         Judgment: Judgment normal.         Lab Review   No visits with results within 2 Month(s) from this visit.   Latest known visit with results is:   Office Visit on 09/20/2023   Component Date Value Ref Range Status   • RESULT ALL_PRESC MEDS SP INSTRNS 09/20/2023 Not Applicable   Final    Comment:  4-ANPP: Fentanyl Negative. Acetyl fentanyl: Fentanyl Negative. Acetyl norfentan  yl: Fentanyl Negative. Acryl fentanyl: Fentanyl Negative. Carfentanil: Fentanyl   Negative. Para-fluorofentanyl: Fentanyl Negative.     • Amphetamine Quantification 09/20/2023 negative  100 ng/mL Final   • Methamphetamine Quantification 09/20/2023 negative  100 ng/mL Final   • Butalbital Quantification 09/20/2023 negative  200 ng/mL Final   • Phenobarbital Quantification 09/20/2023 negative  200 ng/mL Final   • Secobarbital Quantification 09/20/2023 negative  200 ng/mL Final   • Alpha-Hydroxyalprazolam Quantifica* 09/20/2023 positive-573.198-I (A)  20 ng/mL Final   • 7-Amino-Clonazepam Quantification * 09/20/2023 negative  20 ng/mL Final   • Nordiazepam Quantification 09/20/2023 negative  40 ng/mL Final   • Temazepam Quantification 09/20/2023 negative  50 ng/mL Final   • Oxazepam Quantification 09/20/2023 negative  40 ng/mL Final   • Lorazepam Quantification 09/20/2023 negative  40 ng/mL Final   • Gabapentin Quantification 09/20/2023 negative  1000 Final   • PREGABALIN QUANTIFICATION 09/20/2023 negative-I (A)  400 Final   • Cocaine metabolite Quantification 09/20/2023 negative  50 ng/mL Final   • 6-LEEANN (Heroin metabolite) Quantifi* 09/20/2023 negative  10 ng/mL Final   • Buprenorphine  Quantification 09/20/2023 negative  5 ng/mL Final   • Norbuprenorphine Quantification 09/20/2023 negative  20 ng/mL Final   • Dextrorphan (Dextromethorphan meta* 09/20/2023 negative  50 ng/mL Final   • Meperidine Quantification 09/20/2023 negative  50 ng/mL Final   • Normeperidine Quantification 09/20/2023 negative  50 ng/mL Final   • Naltrexone Quantification 09/20/2023 negative  10 ng/mL Final   • NALTREXOL (NALTREXONE METABOLITE) * 09/20/2023 negative  10 ng/mL Final   • Fentanyl Quantification 09/20/2023 negative  1 ng/mL Final   • Norfentanyl Quantification 09/20/2023 negative  8 ng/mL Final   • 4-ANPP Quantification 09/20/2023 Fen Neg  2 ng/mL Final   • Acetyl fentanyl Quantification 09/20/2023 Fen Neg  2 ng/mL Final   • Acetyl norfentanyl Quantification 09/20/2023 Fen Neg  5 ng/mL Final   • Acryl fentanyl Quantification 09/20/2023 Fen Neg  1 ng/mL Final   • Carfentanil Quantification 09/20/2023 Fen Neg  2 ng/mL Final   • Para-fluorofentanyl Quantification 09/20/2023 Fen Neg  1 ng/mL Final   • Methadone Quantification 09/20/2023 negative  100 ng/mL Final   • EDDP (Methadone metabolite) Quanti* 09/20/2023 negative  100 ng/mL Final   • Oxycodone Quantification 09/20/2023 positive-4612.905  50 ng/mL Final   • Noroxycodone Quantification 09/20/2023 positive-5244.238  50 ng/mL Final   • Oxymorphone Quantification 09/20/2023 positive-6534.695  50 ng/mL Final   • Tapentadol Quantification 09/20/2023 negative  50 ng/mL Final   • cTHC (Marijuana metabolite) Quanti* 09/20/2023 negative  15 ng/mL Final   • Tramadol Quantification 09/20/2023 negative  100 ng/mL Final   • O-desmethyl-tramadol Quantification 09/20/2023 negative  100 ng/mL Final   • N-DESMETHYL-TRAMADOL QUANTIFICATION 09/20/2023 negative  100 ng/mL Final   • Codeine Quantification 09/20/2023 negative  50 ng/mL Final   • Morphine Quantification 09/20/2023 negative  50 ng/mL Final   • Hydrocodone Quantification 09/20/2023 negative  50 ng/mL Final   •  Norhydrocodone Quantification 09/20/2023 negative  50 ng/mL Final   • Hydromorphone Quantification 09/20/2023 negative  50 ng/mL Final   • EUTYLONE QUANTIFICATION 09/20/2023 negative  10 ng/mL Final   • METHYLONE QUANTIFICATION 09/20/2023 negative  3 ng/mL Final   • Ethyl Glucuronide Quantification 09/20/2023 negative  500 ng/mL Final   • Ethyl Sulfate Quantification 09/20/2023 negative  500 ng/mL Final   • Mitragynine (Kratom alkaloid) Jemal* 09/20/2023 negative  1 ng/mL Final   • 9-QG-Buclyardkbo (Kratom alkaloid)* 09/20/2023 negative  1 ng/mL Final   • 5F-ADB-M7 09/20/2023 negative  10 ng/mL Final   • NB-NBCYSYVN-P5 METABOLITE QUANTIFI* 09/20/2023 negative  10 ng/mL Final   • QGNM-MYFEPLFS-Z6 METABOLITE QUANTI* 09/20/2023 negative  10 ng/mL Final   • RGI410 metabolite Quantification 09/20/2023 negative  10 ng/mL Final   • KWO683 metabolite Quantification 09/20/2023 negative  10 ng/mL Final   • RCS4 METABOLITE QUANTIFICATION 09/20/2023 negative  10 ng/mL Final   • XLR11/ METABOLITE QUANTIFICAT* 09/20/2023 negative  10 ng/mL Final   • Methylphenidate Quantification 09/20/2023 negative  50 ng/mL Final   • RITALINIC ACID QUANTIFICATION 09/20/2023 negative  50 ng/mL Final   • PHENTERMINE QUANTIFICATION 09/20/2023 negative  50 ng/mL Final   • OXIDANT 09/20/2023 normal-0  <200 ug/mL ug/mL Final   • CREATININE 09/20/2023 normal-138.1  >20 mg/dL mg/dL Final   • pH 09/20/2023 normal-5.5  4.5 - 9.5 Final   • SPECIFIC GRAVITY URINE 09/20/2023 normal-1.015  1.003 - 1.035 Final         Patient Instructions   Chronic Pain   WHAT YOU NEED TO KNOW:   Chronic pain is pain that does not get better for 3 months or longer. Chronic pain may hurt all the time, or come and go.  DISCHARGE INSTRUCTIONS:   Call your local emergency number, or have someone else call (911 in the [place]) if:   You are breathing slower than normal, or you have trouble breathing.    You cannot be awakened.    You have a seizure.    Call your doctor if:   Your  heart feels like it is jumping or fluttering.    You cannot think clearly.    You have side effects from prescription pain medicine, such as itching, nausea, or vomiting.    You have trouble sleeping.    Your pain gets worse, even after you take medicine.    You don't think the medicine is working.    You have questions or concerns about your condition or care.    Medicines:  You may need any of the following:  Acetaminophen  decreases pain and fever. It is available without a doctor's order. Ask how much to take and how often to take it. Follow directions. Read the labels of all other medicines you are using to see if they also contain acetaminophen, or ask your doctor or pharmacist. Acetaminophen can cause liver damage if not taken correctly.    NSAIDs , such as ibuprofen, help decrease swelling, pain, and fever. This medicine is available with or without a doctor's order. NSAIDs can cause stomach bleeding or kidney problems in certain people. If you take blood thinner medicine, always ask your healthcare provider if NSAIDs are safe for you. Always read the medicine label and follow directions.    Prescription pain medicine  called narcotics or opioids may be given for certain types of chronic pain. Ask your healthcare provider how to take this medicine safely.    Anesthetics  can be rubbed on your skin or injected into a nerve or muscle to numb an area.    Other medicines  may reduce pain, anxiety, muscle tension, or swelling.    Take your medicine as directed.  Contact your healthcare provider if you think your medicine is not helping or if you have side effects. Tell your provider if you are allergic to any medicine. Keep a list of the medicines, vitamins, and herbs you take. Include the amounts, and when and why you take them. Bring the list or the pill bottles to follow-up visits. Carry your medicine list with you in case of an emergency.    Manage your chronic pain:   Apply heat  on the area in pain for 20  to 30 minutes every 2 hours for as many days as directed. Heat helps decrease pain and muscle spasms.    Apply ice  on the part of your body that hurts for 15 to 20 minutes every hour or as directed. Use an ice pack, or put crushed ice in a plastic bag. Cover it with a towel. Ice decreases pain and swelling, and helps prevent tissue damage.    Go to physical therapy as directed.  A physical therapist teaches you exercises to help improve movement and strength, and to decrease pain.    Exercise for 30 minutes, 3 times a week.  Regular physical activity can help decrease pain and improve your quality of life. Ask your healthcare provider about the best exercise plan for your type of pain.    Get enough sleep.  Create a relaxing bedtime routine. Go to sleep and wake up at the same time every day. Avoid caffeine in the afternoon.    Talk with a counselor or therapist.  A type of counseling called cognitive behavioral therapy (CBT) can help your chronic pain by changing the way you think about it. CBT can also improve your mood, sleep, and ability to move.    What you must know if you take narcotic pain medicine:   You may need to take a bowel movement softener.  The most common side effect of prescription pain medicine is constipation. Bowel movement softeners are available over the counter.    Do not mix prescription pain medicines.  This can cause an overdose of medicine, which can become life-threatening. Read labels. Make sure you know the ingredients in all of your medicines.    Do not drink alcohol  when you take prescription pain medicine. It is not safe to mix narcotics or opioids with alcohol or illegal drugs.    Prescription pain medicine may impair your ability to drive or work safely.  They may also cause dizziness and increase your risk for falling.    Store prescription pain medicine in a safe location at home.  Keep your medicine away from children and other people. Never share your medicine with  "anyone.    Follow up with your doctor as directed:  You may be referred to a pain specialist. Write down your questions so you remember to ask them during your visits.  © Copyright [employer] 2023 Information is for End User's use only and may not be sold, redistributed or otherwise used for commercial purposes.  The above information is an  only. It is not intended as medical advice for individual conditions or treatments. Talk to your doctor, nurse or pharmacist before following any medical regimen to see if it is safe and effective for you.       Wilmar Bradford MD        \"This note has been constructed using a voice recognition system.Therefore there may be syntax, spelling, and/or grammatical errors. Please call if you have any questions. \"  "

## 2024-02-21 NOTE — ASSESSMENT & PLAN NOTE
Percocet 10-3 25 3 times a day refilled this regimen helping patient carry out day-to-day activities total time remaining follow-up plan finding as follows from a previous progress          Percocet 10/325 4 times a day was refilled  All medical records reviewed and reconciled patient PDMP database reviewed to ensure compliant with the treatment plan for pain management benefits due to the fact that patient has not responded to other measures for pain control so far and severe the pain is significant and interfering with normal daily activities I believe it is reasonable and appropriate to continue the controlled substance in order to improve his ability to function in enhance quality of life the patient has signed a narcotic agreement patient should it was utilize 1 pharmacy and not receiving narcotic from any other provider patient verbalizes understanding that breaching the contract will affect future prescription decision making and critical results in the dismissal from the practice if specifically the has been explained in understood to patient patient demonstrates following informed use of appropriate medicine to analgesia increase activity explained the side effects recommended that thisto the patient patient understands that also advised that appeared to expose him order an abuse and addiction and overdose counseling provided for prevention of any overuse abuse or addiction

## 2024-02-21 NOTE — ASSESSMENT & PLAN NOTE
BMI now 34.73 surgical hyperlipidemia hypertension counseling done very low calorie diet exercise to lose weight    BMI Counseling:      The BMI is above normal. Know Body weight goal    Weigh yourself daily or weekly as per your doctor    Nutrition recommendations include decreasing portion sizes, encouraging healthy choices of fruits and vegetables, decreasing     fast food intake, consuming healthier snacks, limiting drinks that contain sugar, moderation in carbohydrate intake, increasing     intake of lean protein, reducing intake of saturated and trans fat and reducing intake of cholesterol  Discussed options of HealthyCORE-Intensive Lifestyle Intervention Program, Very Low Calorie Diet-VLCD and Conservative Program and the role of weight loss medications.  - Explained the importance of making lifestyle changes in addition to starting anti-obesity medications.   -

## 2024-02-21 NOTE — ASSESSMENT & PLAN NOTE
Patient last LDL reviewed mildly elevated    Repeat LDL before next visit    Until then continue low-fat low-cholesterol diet if needed start statin

## 2024-02-21 NOTE — ASSESSMENT & PLAN NOTE
Lab Results   Component Value Date    HGBA1C 5.3 08/30/2022   Above reviewed borderline    Continue diabetic diet    I will check A1c before next visit

## 2024-03-20 ENCOUNTER — OFFICE VISIT (OUTPATIENT)
Dept: INTERNAL MEDICINE CLINIC | Facility: CLINIC | Age: 59
End: 2024-03-20
Payer: COMMERCIAL

## 2024-03-20 VITALS
OXYGEN SATURATION: 98 % | HEART RATE: 83 BPM | DIASTOLIC BLOOD PRESSURE: 82 MMHG | HEIGHT: 71 IN | BODY MASS INDEX: 35 KG/M2 | WEIGHT: 250 LBS | SYSTOLIC BLOOD PRESSURE: 136 MMHG

## 2024-03-20 DIAGNOSIS — F11.20 UNCOMPLICATED OPIOID DEPENDENCE (HCC): ICD-10-CM

## 2024-03-20 DIAGNOSIS — M47.816 LUMBAR SPONDYLOSIS: Primary | ICD-10-CM

## 2024-03-20 DIAGNOSIS — Z98.84 GASTRIC BYPASS STATUS FOR OBESITY: ICD-10-CM

## 2024-03-20 DIAGNOSIS — R73.03 PREDIABETES: ICD-10-CM

## 2024-03-20 DIAGNOSIS — M48.061 LUMBAR FORAMINAL STENOSIS: ICD-10-CM

## 2024-03-20 DIAGNOSIS — M17.0 PRIMARY OSTEOARTHRITIS OF BOTH KNEES: ICD-10-CM

## 2024-03-20 DIAGNOSIS — J30.1 NON-SEASONAL ALLERGIC RHINITIS DUE TO POLLEN: ICD-10-CM

## 2024-03-20 DIAGNOSIS — M51.26 LUMBAR DISC HERNIATION: ICD-10-CM

## 2024-03-20 DIAGNOSIS — M51.36 DDD (DEGENERATIVE DISC DISEASE), LUMBAR: ICD-10-CM

## 2024-03-20 DIAGNOSIS — I10 BENIGN ESSENTIAL HYPERTENSION: ICD-10-CM

## 2024-03-20 DIAGNOSIS — G62.9 NEUROPATHY: ICD-10-CM

## 2024-03-20 DIAGNOSIS — E78.2 MIXED HYPERLIPIDEMIA: ICD-10-CM

## 2024-03-20 DIAGNOSIS — F11.20 CONTINUOUS OPIOID DEPENDENCE (HCC): ICD-10-CM

## 2024-03-20 PROCEDURE — 99214 OFFICE O/P EST MOD 30 MIN: CPT | Performed by: INTERNAL MEDICINE

## 2024-03-20 RX ORDER — OXYCODONE AND ACETAMINOPHEN 10; 325 MG/1; MG/1
1 TABLET ORAL EVERY 8 HOURS PRN
Qty: 90 TABLET | Refills: 0 | Status: SHIPPED | OUTPATIENT
Start: 2024-03-20

## 2024-03-20 NOTE — PATIENT INSTRUCTIONS
Consider seeing pain management doctor.Follow with Consultants as per their and our suggestion    Follow up in one month or as needed earlier    Follow all instructions as advised and discussed.    Take your medications as prescribed.    Call the office immediately if you experience any side effects.    Ask questions if you do not understand.    Keep your scheduled appointment as advised or come sooner if necessary or in doubt.    Best time to call for non-urgent matter or questions on weekdays is between 9am and 12 noon.    See physician for any new symptoms or worsening of current symptoms.    Urgent or emergent situations call 911 and report to nearest emergency room.    I spent  time taking care of this patient including clinical care, conseling, collaboration, chart, lab and consultant's follow up note,images report, documentation, pre visit  review as appropriate.    Patient is to get labs 1 week(s) prior to next visit if advised

## 2024-03-20 NOTE — PROGRESS NOTES
Name: Ifeanyi Puga      : 1965      MRN: [id number]  Encounter Provider: Mike Pettit MD  Encounter Date: 3/20/2024   Encounter department: [facility] INTERNAL MEDICINE    Assessment & Plan     1. Lumbar spondylosis  Assessment & Plan:  Known case of DDD, disc herniation, foraminal stenosis, osteoarthritis of the both knee, lumbar spondylosis.   He continues to have low back pain.  Patient continues to have bilateral radiculopathy.  Pain is moderately severe.  Remains on Percocet 3-4 times a day.  No abuse no dependency no side effect.  Pain not controlled with the Tylenol.  Patient continues to have tingling and numbness in the leg.  Known case of neuropathy.  Patient wants to continue to work.  He was advised surgery wants to work for 5 more years does not want to go for surgery..  No problem with the constipation.    Will continue percoet, lyrica, lexapro, celebrex,  No side effects or drug interaction  Lab Results   Component Value Date    SODIUM 142 2022    K 4.5 2022     2022    CO2 31 2022    AGAP 5 2022    BUN 24 2022    CREATININE 1.26 2022    GLUC 117 (H) 2022    CALCIUM 9.3 2022    AST 19 2022    ALT 25 2022    ALKPHOS 113 2022    TP 7.6 2022    TBILI 0.5 2022    EGFR 67 2022       2. Lumbar foraminal stenosis  Assessment & Plan:  Known case of DDD, disc herniation, foraminal stenosis, osteoarthritis of the both knee, lumbar spondylosis.   He continues to have low back pain.  Patient continues to have bilateral radiculopathy.  Pain is moderately severe.  Remains on Percocet 3-4 times a day.  No abuse no dependency no side effect.  Pain not controlled with the Tylenol.  Patient continues to have tingling and numbness in the leg.  Known case of neuropathy.  Patient wants to continue to work.  He was advised surgery wants to work for 5 more years does not want to go for surgery..  No  problem with the constipation.    Will continue percoet, lyrica, lexapro, celebrex,  No side effects or drug interaction  Lab Results   Component Value Date    SODIUM 142 08/30/2022    K 4.5 08/30/2022     08/30/2022    CO2 31 08/30/2022    AGAP 5 08/30/2022    BUN 24 08/30/2022    CREATININE 1.26 08/30/2022    GLUC 117 (H) 08/30/2022    CALCIUM 9.3 08/30/2022    AST 19 08/30/2022    ALT 25 08/30/2022    ALKPHOS 113 08/30/2022    TP 7.6 08/30/2022    TBILI 0.5 08/30/2022    EGFR 67 08/30/2022     Orders:  -     oxyCODONE-acetaminophen (PERCOCET)  mg per tablet; Take 1 tablet by mouth every 8 (eight) hours as needed for severe pain Max Daily Amount: 3 tablets    3. Lumbar disc herniation  Assessment & Plan:  Known case of DDD, disc herniation, foraminal stenosis, osteoarthritis of the both knee, lumbar spondylosis.   He continues to have low back pain.  Patient continues to have bilateral radiculopathy.  Pain is moderately severe.  Remains on Percocet 3-4 times a day.  No abuse no dependency no side effect.  Pain not controlled with the Tylenol.  Patient continues to have tingling and numbness in the leg.  Known case of neuropathy.  Patient wants to continue to work.  He was advised surgery wants to work for 5 more years does not want to go for surgery..  No problem with the constipation.    Will continue percoet, lyrica, lexapro, celebrex,  No side effects or drug interaction  Lab Results   Component Value Date    SODIUM 142 08/30/2022    K 4.5 08/30/2022     08/30/2022    CO2 31 08/30/2022    AGAP 5 08/30/2022    BUN 24 08/30/2022    CREATININE 1.26 08/30/2022    GLUC 117 (H) 08/30/2022    CALCIUM 9.3 08/30/2022    AST 19 08/30/2022    ALT 25 08/30/2022    ALKPHOS 113 08/30/2022    TP 7.6 08/30/2022    TBILI 0.5 08/30/2022    EGFR 67 08/30/2022     Orders:  -     oxyCODONE-acetaminophen (PERCOCET)  mg per tablet; Take 1 tablet by mouth every 8 (eight) hours as needed for severe pain Max  "Daily Amount: 3 tablets    4. DDD (degenerative disc disease), lumbar  Assessment & Plan:  Known case of DDD, disc herniation, foraminal stenosis, osteoarthritis of the both knee, lumbar spondylosis.   He continues to have low back pain.  Patient continues to have bilateral radiculopathy.  Pain is moderately severe.  Remains on Percocet 3-4 times a day.  No abuse no dependency no side effect.  Pain not controlled with the Tylenol.  Patient continues to have tingling and numbness in the leg.  Known case of neuropathy.  Patient wants to continue to work.  He was advised surgery wants to work for 5 more years does not want to go for surgery..  No problem with the constipation.    Will continue percoet, lyrica, lexapro, celebrex,  No side effects or drug interaction  Lab Results   Component Value Date    SODIUM 142 08/30/2022    K 4.5 08/30/2022     08/30/2022    CO2 31 08/30/2022    AGAP 5 08/30/2022    BUN 24 08/30/2022    CREATININE 1.26 08/30/2022    GLUC 117 (H) 08/30/2022    CALCIUM 9.3 08/30/2022    AST 19 08/30/2022    ALT 25 08/30/2022    ALKPHOS 113 08/30/2022    TP 7.6 08/30/2022    TBILI 0.5 08/30/2022    EGFR 67 08/30/2022       Orders:  -     oxyCODONE-acetaminophen (PERCOCET)  mg per tablet; Take 1 tablet by mouth every 8 (eight) hours as needed for severe pain Max Daily Amount: 3 tablets    5. Neuropathy  Assessment & Plan:  Chornic neuropathy lower extremities  Will continue lryica  No side effect or drug interaction  Fair control      6. Primary osteoarthritis of both knees  Assessment & Plan:  Knee pain chronic fair    Continue celebrex no gi side effect  No results found for: \"WBC\", \"HGB\", \"HCT\", \"MCV\", \"PLT\"  Will also continue percocet as needed      7. Continuous opioid dependence (HCC)  Assessment & Plan:  Known case of DDD, disc herniation, foraminal stenosis, osteoarthritis of the both knee, lumbar spondylosis.   He continues to have low back pain.  Patient continues to have " bilateral radiculopathy.  Pain is moderately severe.  Remains on Percocet 3-4 times a day.  No abuse no dependency no side effect.  Pain not controlled with the Tylenol.  Patient continues to have tingling and numbness in the leg.  Known case of neuropathy.  Patient wants to continue to work.  He was advised surgery wants to work for 5 more years does not want to go for surgery..  No problem with the constipation.    Will continue percoet, lyrica, lexapro, celebrex,  No side effects or drug interaction  Lab Results   Component Value Date    SODIUM 142 08/30/2022    K 4.5 08/30/2022     08/30/2022    CO2 31 08/30/2022    AGAP 5 08/30/2022    BUN 24 08/30/2022    CREATININE 1.26 08/30/2022    GLUC 117 (H) 08/30/2022    CALCIUM 9.3 08/30/2022    AST 19 08/30/2022    ALT 25 08/30/2022    ALKPHOS 113 08/30/2022    TP 7.6 08/30/2022    TBILI 0.5 08/30/2022    EGFR 67 08/30/2022       8. Uncomplicated opioid dependence (HCC)    9. Gastric bypass status for obesity  Assessment & Plan:  Rec diet      10. Mixed hyperlipidemia    11. Benign essential hypertension  Assessment & Plan:  Vitals:    03/20/24 1433   BP: 136/82   Pulse: 83   SpO2: 98%     Blood pressure controlled except for diastolic 84 asymptomatic    Continue monitoring blood pressure at home    Will continue following    Maxide 37.5 daily once a day in the morning    Soni 5/40 a day    Patient asymptomatic      12. Prediabetes    13. Non-seasonal allergic rhinitis due to pollen  Assessment & Plan:  Fair    Rec continue zyrtec pan and flonase as needed  Rec saline nasal spray twice a day use mask             Subjective      Patient is here for chronic disease management.    Known case of DDD, disc herniation, foraminal stenosis, osteoarthritis of the both knee, lumbar spondylosis.   He continues to have low back pain.  Patient continues to have bilateral radiculopathy.  Pain is moderately severe.  Remains on Percocet 3-4 times a day.  No abuse no dependency  "no side effect.  Pain not controlled with the Tylenol.  Patient continues to have tingling and numbness in the leg.  Known case of neuropathy.  Patient wants to continue to work.  He was advised surgery wants to work for 5 more years does not want to go for surgery..  No problem with the constipation.    Hypertension symptom-free tolerating blood pressure medication with no side effect.  Neuropathy is stable.    Known case of DDD, disc herniation, foraminal stenosis, osteoarthritis of the both knee, lumbar spondylosis.  History of gastric bypass.  BMI remains high.    Lung cancer prediabetes symptom-free.  Lab Results       Component                Value               Date                       HGBA1C                   5.3                 08/30/2022            Patient was given blood test paper last month he did not get chance to go for it strongly encouraged to go for it      No results found for: \"PSA\"        Review of Systems   Constitutional:  Negative for appetite change, fatigue, fever and unexpected weight change.   HENT:  Negative for congestion, ear pain and sore throat.    Eyes:  Negative for pain and redness.   Respiratory:  Negative for cough and shortness of breath.    Cardiovascular:  Negative for chest pain, palpitations and leg swelling.   Gastrointestinal:  Negative for abdominal pain, diarrhea, nausea and vomiting.   Endocrine: Negative for cold intolerance, polydipsia and polyuria.   Genitourinary:  Negative for dysuria, frequency and urgency.   Musculoskeletal:  Positive for back pain. Negative for arthralgias, gait problem and myalgias.   Skin:  Negative for rash.   Allergic/Immunologic: Negative.    Neurological:  Positive for numbness. Negative for dizziness, tremors, seizures, syncope, speech difficulty, weakness and headaches.   Hematological:  Negative for adenopathy.   Psychiatric/Behavioral:  Negative for behavioral problems, confusion, decreased concentration, dysphoric mood, " "hallucinations, self-injury, sleep disturbance and suicidal ideas. The patient is not nervous/anxious and is not hyperactive.        Current Outpatient Medications on File Prior to Visit   Medication Sig   • amlodipine-olmesartan (Soni) 5-40 MG Take 1 tablet by mouth daily   • celecoxib (CeleBREX) 200 mg capsule Take 1 capsule (200 mg total) by mouth daily   • cetirizine (ZyrTEC) 10 mg tablet Take 1 tablet (10 mg total) by mouth daily   • escitalopram (LEXAPRO) 20 mg tablet Take 1 tablet (20 mg total) by mouth daily   • fluticasone (FLONASE) 50 mcg/act nasal spray 1 spray into each nostril daily   • pramipexole (MIRAPEX) 0.5 mg tablet Take 1 tablet (0.5 mg total) by mouth daily at bedtime   • neomycin-polymyxin-hydrocortisone (CORTISPORIN) 0.35%-10,000 units/mL-1% otic suspension Administer 3 drops into the left ear 4 (four) times a day for 7 days   • pregabalin (LYRICA) 100 mg capsule Take 1 capsule (100 mg total) by mouth 2 (two) times a day   • sildenafil (Viagra) 100 mg tablet Take 1 tablet (100 mg total) by mouth daily as needed for erectile dysfunction   • tamsulosin (FLOMAX) 0.4 mg Take 1 capsule (0.4 mg total) by mouth daily with dinner   • triamterene-hydrochlorothiazide (MAXZIDE-25) 37.5-25 mg per tablet Take 1 tablet by mouth daily       Objective     /82   Pulse 83   Ht 5' 11\" (1.803 m)   Wt 113 kg (250 lb)   SpO2 98%   BMI 34.87 kg/m²     Physical Exam  Constitutional:       General: He is not in acute distress.     Appearance: He is well-developed. He is obese. He is not ill-appearing or diaphoretic.   HENT:      Head: Normocephalic and atraumatic.      Right Ear: External ear normal.      Left Ear: External ear normal.   Eyes:      General: Lids are normal.         Right eye: No discharge.         Left eye: No discharge.      Conjunctiva/sclera: Conjunctivae normal.   Neck:      Thyroid: No thyroid mass or thyromegaly.      Vascular: No carotid bruit or JVD.      Trachea: Trachea normal. "   Cardiovascular:      Rate and Rhythm: Regular rhythm.      Heart sounds: Normal heart sounds. No murmur heard.  Pulmonary:      Effort: No respiratory distress.      Breath sounds: No wheezing, rhonchi or rales.   Musculoskeletal:      Right lower leg: No edema.      Left lower leg: No edema.   Lymphadenopathy:      Cervical: No cervical adenopathy.   Skin:     General: Skin is warm.      Coloration: Skin is not jaundiced or pale.      Findings: No rash.   Neurological:      General: No focal deficit present.      Mental Status: He is alert and oriented to person, place, and time.      Sensory: Sensory deficit present.   Psychiatric:         Attention and Perception: Attention normal.         Mood and Affect: Mood is not anxious.         Speech: Speech normal.         Behavior: Behavior is not agitated, aggressive, withdrawn, hyperactive or combative.         Thought Content: Thought content is not paranoid or delusional. Thought content does not include homicidal or suicidal ideation. Thought content does not include suicidal plan.         Cognition and Memory: Cognition and memory normal.         Judgment: Judgment normal.       Mike Pettit MD

## 2024-04-06 NOTE — ASSESSMENT & PLAN NOTE
Fair    Rec continue zyrtec pan and flonase as needed  Rec saline nasal spray twice a day use mask

## 2024-04-06 NOTE — ASSESSMENT & PLAN NOTE
Chornic neuropathy lower extremities  Will continue lryica  No side effect or drug interaction  Fair control

## 2024-04-06 NOTE — ASSESSMENT & PLAN NOTE
Known case of DDD, disc herniation, foraminal stenosis, osteoarthritis of the both knee, lumbar spondylosis.   He continues to have low back pain.  Patient continues to have bilateral radiculopathy.  Pain is moderately severe.  Remains on Percocet 3-4 times a day.  No abuse no dependency no side effect.  Pain not controlled with the Tylenol.  Patient continues to have tingling and numbness in the leg.  Known case of neuropathy.  Patient wants to continue to work.  He was advised surgery wants to work for 5 more years does not want to go for surgery..  No problem with the constipation.    Will continue percoet, lyrica, lexapro, celebrex,  No side effects or drug interaction  Lab Results   Component Value Date    SODIUM 142 08/30/2022    K 4.5 08/30/2022     08/30/2022    CO2 31 08/30/2022    AGAP 5 08/30/2022    BUN 24 08/30/2022    CREATININE 1.26 08/30/2022    GLUC 117 (H) 08/30/2022    CALCIUM 9.3 08/30/2022    AST 19 08/30/2022    ALT 25 08/30/2022    ALKPHOS 113 08/30/2022    TP 7.6 08/30/2022    TBILI 0.5 08/30/2022    EGFR 67 08/30/2022

## 2024-04-06 NOTE — ASSESSMENT & PLAN NOTE
"Knee pain chronic fair    Continue celebrex no gi side effect  No results found for: \"WBC\", \"HGB\", \"HCT\", \"MCV\", \"PLT\"  Will also continue percocet as needed  "

## 2024-04-06 NOTE — ASSESSMENT & PLAN NOTE
Vitals:    03/20/24 1433   BP: 136/82   Pulse: 83   SpO2: 98%     Blood pressure controlled except for diastolic 84 asymptomatic    Continue monitoring blood pressure at home    Will continue following    Maxide 37.5 daily once a day in the morning    Soni 5/40 a day    Patient asymptomatic

## 2024-04-17 ENCOUNTER — OFFICE VISIT (OUTPATIENT)
Dept: INTERNAL MEDICINE CLINIC | Facility: CLINIC | Age: 59
End: 2024-04-17
Payer: COMMERCIAL

## 2024-04-17 VITALS
DIASTOLIC BLOOD PRESSURE: 88 MMHG | RESPIRATION RATE: 15 BRPM | HEART RATE: 83 BPM | WEIGHT: 248 LBS | HEIGHT: 71 IN | OXYGEN SATURATION: 97 % | SYSTOLIC BLOOD PRESSURE: 148 MMHG | TEMPERATURE: 98.2 F | BODY MASS INDEX: 34.72 KG/M2

## 2024-04-17 DIAGNOSIS — M51.26 LUMBAR DISC HERNIATION: ICD-10-CM

## 2024-04-17 DIAGNOSIS — M48.061 LUMBAR FORAMINAL STENOSIS: ICD-10-CM

## 2024-04-17 DIAGNOSIS — E66.01 MORBID (SEVERE) OBESITY DUE TO EXCESS CALORIES (HCC): Primary | ICD-10-CM

## 2024-04-17 DIAGNOSIS — E78.2 MIXED HYPERLIPIDEMIA: ICD-10-CM

## 2024-04-17 DIAGNOSIS — I10 BENIGN ESSENTIAL HYPERTENSION: ICD-10-CM

## 2024-04-17 DIAGNOSIS — R73.03 PREDIABETES: ICD-10-CM

## 2024-04-17 PROCEDURE — 99214 OFFICE O/P EST MOD 30 MIN: CPT | Performed by: INTERNAL MEDICINE

## 2024-04-17 RX ORDER — OXYCODONE AND ACETAMINOPHEN 10; 325 MG/1; MG/1
1 TABLET ORAL EVERY 8 HOURS PRN
Qty: 90 TABLET | Refills: 0 | Status: SHIPPED | OUTPATIENT
Start: 2024-04-17

## 2024-04-17 NOTE — ASSESSMENT & PLAN NOTE
Patient last LDL reviewed mildly elevated    Repeat LDL before next visit    Until then continue low-fat low-cholesterol diet if needed start statin    Still awaiting lipid profile

## 2024-04-17 NOTE — ASSESSMENT & PLAN NOTE
Lab Results   Component Value Date    HGBA1C 5.3 08/30/2022   Above reviewed borderline    Continue diabetic diet    I will check A1c before next visit    Above reviewed advise diabetic diet started on Wegovy injection to lose weight awaiting repeat A1c complete blood work

## 2024-04-17 NOTE — ASSESSMENT & PLAN NOTE
Blood pressure reviewed elevated since patient has not taken any antihypertensive today    Agree and continue management as follows    Asymptomatic    Low-salt diet    Soni 5/40 daily    Monitor blood pressure at home    Maxide 37.5/25 daily

## 2024-04-17 NOTE — ASSESSMENT & PLAN NOTE
Patient's BMI 35 with comorbid condition prediabetes hypertension hyperlipidemia not able to lose weight with diet exercise counseling done very low calorie diet cannot do exercise due to the chronic interval pain due to the lumbar radiculopathy and degenerative joint disease multiple joints including hip and knee    Will start Wegovy 0.5 mg weekly    Will start Wegovy injection 0.5 mg weekly explained the side effects at length and of family history or history of thyroid cancer no history of pancreatitis    BMI Counseling:      The BMI is above normal. Know Body weight goal    Weigh yourself daily or weekly as per your doctor    Nutrition recommendations include decreasing portion sizes, encouraging healthy choices of fruits and vegetables, decreasing     fast food intake, consuming healthier snacks, limiting drinks that contain sugar, moderation in carbohydrate intake, increasing     intake of lean protein, reducing intake of saturated and trans fat and reducing intake of cholesterol  Discussed options of HealthyCORE-Intensive Lifestyle Intervention Program, Very Low Calorie Diet-VLCD and Conservative Program and the role of weight loss medications.  - Explained the importance of making lifestyle changes in addition to starting anti-obesity medications.   -

## 2024-04-17 NOTE — PROGRESS NOTES
Dr. Bradford's Office Visit Note  24     Ifeanyi Puga 58 y.o. male MRN: [id number]  : 1965    Assessment:     1. Morbid (severe) obesity due to excess calories (HCC)  Assessment & Plan:  Patient's BMI 35 with comorbid condition prediabetes hypertension hyperlipidemia not able to lose weight with diet exercise counseling done very low calorie diet cannot do exercise due to the chronic interval pain due to the lumbar radiculopathy and degenerative joint disease multiple joints including hip and knee    Will start Wegovy 0.5 mg weekly    Will start Wegovy injection 0.5 mg weekly explained the side effects at length and of family history or history of thyroid cancer no history of pancreatitis    BMI Counseling:      The BMI is above normal. Know Body weight goal    Weigh yourself daily or weekly as per your doctor    Nutrition recommendations include decreasing portion sizes, encouraging healthy choices of fruits and vegetables, decreasing     fast food intake, consuming healthier snacks, limiting drinks that contain sugar, moderation in carbohydrate intake, increasing     intake of lean protein, reducing intake of saturated and trans fat and reducing intake of cholesterol  Discussed options of Healthy[facility]-Intensive Lifestyle Intervention Program, Very Low Calorie Diet-VLCD and Conservative Program and the role of weight loss medications.  - Explained the importance of making lifestyle changes in addition to starting anti-obesity medications.   -          Orders:  -     Semaglutide-Weight Management (WEGOVY) 0.25 MG/0.5ML; Inject 0.5 mL (0.25 mg total) under the skin once a week for 28 days    2. Lumbar foraminal stenosis  Assessment & Plan:  Same as under lumbar disc herniation Percocet was refilled    Orders:  -     oxyCODONE-acetaminophen (PERCOCET)  mg per tablet; Take 1 tablet by mouth every 8 (eight) hours as needed for severe pain Max Daily Amount: 3 tablets    3. Lumbar disc herniation  Assessment  & Plan:  Percocet 10-3 25 4 times a day was refilled this regimen helping patient control pain function and work full-time remaining follow-up plan finding counseling as follows        Percocet 10/325 4 times a day was refilled  All medical records reviewed and reconciled patient PDMP database reviewed to ensure compliant with the treatment plan for pain management benefits due to the fact that patient has not responded to other measures for pain control so far and severe the pain is significant and interfering with normal daily activities I believe it is reasonable and appropriate to continue the controlled substance in order to improve his ability to function in enhance quality of life the patient has signed a narcotic agreement patient should it was utilize 1 pharmacy and not receiving narcotic from any other provider patient verbalizes understanding that breaching the contract will affect future prescription decision making and critical results in the dismissal from the practice if specifically the has been explained in understood to patient patient demonstrates following informed use of appropriate medicine to analgesia increase activity explained the side effects recommended that thisto the patient patient understands that also advised that appeared to expose him order an abuse and addiction and overdose counseling provided for prevention of any overuse abuse or addiction    Orders:  -     oxyCODONE-acetaminophen (PERCOCET)  mg per tablet; Take 1 tablet by mouth every 8 (eight) hours as needed for severe pain Max Daily Amount: 3 tablets  -     Semaglutide-Weight Management (WEGOVY) 0.25 MG/0.5ML; Inject 0.5 mL (0.25 mg total) under the skin once a week for 28 days    4. Mixed hyperlipidemia  Assessment & Plan:  Patient last LDL reviewed mildly elevated    Repeat LDL before next visit    Until then continue low-fat low-cholesterol diet if needed start statin    Still awaiting lipid profile    Orders:  -      Semaglutide-Weight Management (WEGOVY) 0.25 MG/0.5ML; Inject 0.5 mL (0.25 mg total) under the skin once a week for 28 days    5. Benign essential hypertension  Assessment & Plan:  Blood pressure reviewed elevated since patient has not taken any antihypertensive today    Agree and continue management as follows    Asymptomatic    Low-salt diet    Soni 5/40 daily    Monitor blood pressure at home    Maxide 37.5/25 daily    Orders:  -     Semaglutide-Weight Management (WEGOVY) 0.25 MG/0.5ML; Inject 0.5 mL (0.25 mg total) under the skin once a week for 28 days          Discussion Summary and Plan:  Today's care plan and medications were reviewed with patient in detail and all their questions answered to their satisfaction.    Chief Complaint   Patient presents with   • Follow-up     Monthly checkup      Subjective:  Came in follow-up chronic medical condition listed visit diagnosis mainly refill for Percocet 10-3 25 4 times a day was refilled patient claims in spite of trying diet cannot lose weight cannot do exercise due to the overall condition including lumbar radiculopathy and degenerative joint disease and patient has comorbid condition discussed at length started on Wegovy injection    Side effects no history of pancreatitis or thyroid cancer for details refer to assessment plan visit diagnosis still awaiting blood work        The following portions of the patient's history were reviewed and updated as appropriate: allergies, current medications, past family history, past medical history, past social history, past surgical history and problem list.    Review of Systems   Constitutional:  Positive for activity change. Negative for appetite change, chills, diaphoresis, fatigue, fever and unexpected weight change.   HENT:  Negative for congestion, dental problem, drooling, ear discharge, ear pain, facial swelling, hearing loss, mouth sores, nosebleeds, postnasal drip, rhinorrhea, sinus pressure, sneezing, sore  throat, tinnitus, trouble swallowing and voice change.    Eyes:  Negative for photophobia, pain, discharge, redness, itching and visual disturbance.   Respiratory:  Negative for apnea, cough, choking, chest tightness, shortness of breath, wheezing and stridor.    Cardiovascular:  Negative for chest pain, palpitations and leg swelling.   Gastrointestinal:  Negative for abdominal distention, abdominal pain, anal bleeding, blood in stool, constipation, diarrhea, nausea, rectal pain and vomiting.   Endocrine: Negative for cold intolerance, heat intolerance, polydipsia, polyphagia and polyuria.   Genitourinary:  Positive for frequency. Negative for decreased urine volume, difficulty urinating, dysuria, enuresis, flank pain, genital sores, hematuria and urgency.   Musculoskeletal:  Positive for arthralgias, back pain, gait problem and myalgias. Negative for joint swelling, neck pain and neck stiffness.   Skin:  Negative for color change, pallor, rash and wound.   Allergic/Immunologic: Negative.  Negative for environmental allergies, food allergies and immunocompromised state.   Neurological:  Negative for dizziness, tremors, seizures, syncope, facial asymmetry, speech difficulty, weakness, light-headedness, numbness and headaches.   Psychiatric/Behavioral:  Negative for agitation, behavioral problems, confusion, decreased concentration, dysphoric mood, hallucinations, self-injury, sleep disturbance and suicidal ideas. The patient is not nervous/anxious and is not hyperactive.          Historical Information   Patient Active Problem List   Diagnosis   • Benign essential hypertension   • Benign prostatic hyperplasia   • DDD (degenerative disc disease), lumbar   • Incomplete emptying of bladder   • Lumbar disc herniation   • Lumbar foraminal stenosis   • Neuropathy   • Chronic intractable pain   • Mild episode of recurrent major depressive disorder (HCC)   • Hematospermia   • Prostatitis   • Other male erectile dysfunction    • Annual physical exam   • Primary osteoarthritis of both knees   • Chronic cough   • Non-seasonal allergic rhinitis due to pollen   • Continuous opioid dependence (HCC)   • Pain   • Swelling   • Intermittent claudication due to atherosclerosis of artery of extremity (HCC)   • Lumbar spondylosis   • Gastric bypass status for obesity   • Morbid (severe) obesity due to excess calories (HCC)   • Uncomplicated opioid dependence (HCC)   • Allergic reaction   • Prediabetes   • Mixed hyperlipidemia     Past Medical History:   Diagnosis Date   • Abdominal pain    • Anxiety disorder    • Arthritis    • Back pain    • Dizziness    • Headache    • Hypertension    • Lightheadedness    • Obesity    • Palpitations    • SOB (shortness of breath)      Past Surgical History:   Procedure Laterality Date   • CHOLECYSTECTOMY     • GASTRIC BYPASS       Social History     Substance and Sexual Activity   Alcohol Use None    Comment: none per [place]     Social History     Substance and Sexual Activity   Drug Use Not on file    Comment: none per [place]     Social History     Tobacco Use   Smoking Status Never   Smokeless Tobacco Never     History reviewed. No pertinent family history.  Health Maintenance Due   Topic   • Hepatitis C Screening    • HIV Screening    • Zoster Vaccine (1 of 2)   • COVID-19 Vaccine (7 - 2023-24 season)   • Annual Physical       Meds/Allergies       Current Outpatient Medications:   •  amlodipine-olmesartan (Soni) 5-40 MG, Take 1 tablet by mouth daily, Disp: 90 tablet, Rfl: 2  •  celecoxib (CeleBREX) 200 mg capsule, Take 1 capsule (200 mg total) by mouth daily, Disp: 90 capsule, Rfl: 2  •  cetirizine (ZyrTEC) 10 mg tablet, Take 1 tablet (10 mg total) by mouth daily, Disp: 30 tablet, Rfl: 2  •  escitalopram (LEXAPRO) 20 mg tablet, Take 1 tablet (20 mg total) by mouth daily, Disp: 90 tablet, Rfl: 0  •  fluticasone (FLONASE) 50 mcg/act nasal spray, 1 spray into each nostril daily, Disp: 18.2 mL, Rfl: 2  •   "oxyCODONE-acetaminophen (PERCOCET)  mg per tablet, Take 1 tablet by mouth every 8 (eight) hours as needed for severe pain Max Daily Amount: 3 tablets, Disp: 90 tablet, Rfl: 0  •  pramipexole (MIRAPEX) 0.5 mg tablet, Take 1 tablet (0.5 mg total) by mouth daily at bedtime, Disp: 30 tablet, Rfl: 5  •  pregabalin (LYRICA) 100 mg capsule, Take 1 capsule (100 mg total) by mouth 2 (two) times a day, Disp: 180 capsule, Rfl: 2  •  Semaglutide-Weight Management (WEGOVY) 0.25 MG/0.5ML, Inject 0.5 mL (0.25 mg total) under the skin once a week for 28 days, Disp: 2 mL, Rfl: 0  •  sildenafil (Viagra) 100 mg tablet, Take 1 tablet (100 mg total) by mouth daily as needed for erectile dysfunction, Disp: 10 tablet, Rfl: 55  •  tamsulosin (FLOMAX) 0.4 mg, Take 1 capsule (0.4 mg total) by mouth daily with dinner, Disp: 90 capsule, Rfl: 2  •  triamterene-hydrochlorothiazide (MAXZIDE-25) 37.5-25 mg per tablet, Take 1 tablet by mouth daily, Disp: 90 tablet, Rfl: 2  •  neomycin-polymyxin-hydrocortisone (CORTISPORIN) 0.35%-10,000 units/mL-1% otic suspension, Administer 3 drops into the left ear 4 (four) times a day for 7 days, Disp: 10 mL, Rfl: 0      Objective:    Vitals:   /88   Pulse 83   Temp 98.2 °F (36.8 °C)   Resp 15   Ht 5' 11\" (1.803 m)   Wt 112 kg (248 lb)   SpO2 97%   BMI 34.59 kg/m²   Body mass index is 34.59 kg/m².  Vitals:    04/17/24 1617   Weight: 112 kg (248 lb)       Physical Exam  Vitals and nursing note reviewed.   Constitutional:       General: He is not in acute distress.     Appearance: He is well-developed. He is obese. He is not ill-appearing, toxic-appearing or diaphoretic.   HENT:      Head: Normocephalic and atraumatic.      Right Ear: External ear normal.      Left Ear: External ear normal.      Nose: Nose normal.      Mouth/Throat:      Pharynx: No oropharyngeal exudate.   Eyes:      General: Lids are normal. Lids are everted, no foreign bodies appreciated. No scleral icterus.        Right eye: " No discharge.         Left eye: No discharge.      Conjunctiva/sclera: Conjunctivae normal.      Pupils: Pupils are equal, round, and reactive to light.   Neck:      Thyroid: No thyromegaly.      Vascular: Normal carotid pulses. No carotid bruit, hepatojugular reflux or JVD.      Trachea: No tracheal tenderness or tracheal deviation.   Cardiovascular:      Rate and Rhythm: Normal rate and regular rhythm.      Pulses: Normal pulses.      Heart sounds: Normal heart sounds. No murmur heard.     No friction rub. No gallop.   Pulmonary:      Effort: Pulmonary effort is normal. No respiratory distress.      Breath sounds: Normal breath sounds. No stridor. No wheezing or rales.   Chest:      Chest wall: No tenderness.   Abdominal:      General: Bowel sounds are normal. There is no distension.      Palpations: Abdomen is soft. There is no mass.      Tenderness: There is no abdominal tenderness. There is no guarding or rebound.   Musculoskeletal:         General: No tenderness or deformity. Normal range of motion.      Cervical back: Normal range of motion and neck supple. No edema, erythema or rigidity. No spinous process tenderness or muscular tenderness. Normal range of motion.   Lymphadenopathy:      Head:      Right side of head: No submental, submandibular, tonsillar, preauricular or posterior auricular adenopathy.      Left side of head: No submental, submandibular, tonsillar, preauricular, posterior auricular or occipital adenopathy.      Cervical: No cervical adenopathy.      Right cervical: No superficial, deep or posterior cervical adenopathy.     Left cervical: No superficial, deep or posterior cervical adenopathy.      Upper Body:      Right upper body: No pectoral adenopathy.      Left upper body: No pectoral adenopathy.   Skin:     General: Skin is warm and dry.      Coloration: Skin is not pale.      Findings: No erythema or rash.   Neurological:      General: No focal deficit present.      Mental Status: He  is alert and oriented to person, place, and time.      Cranial Nerves: No cranial nerve deficit.      Sensory: No sensory deficit.      Motor: No tremor, abnormal muscle tone or seizure activity.      Coordination: Coordination normal.      Gait: Gait normal.      Deep Tendon Reflexes: Reflexes are normal and symmetric. Reflexes normal.   Psychiatric:         Behavior: Behavior normal.         Thought Content: Thought content normal.         Judgment: Judgment normal.         Lab Review   No visits with results within 2 Month(s) from this visit.   Latest known visit with results is:   Office Visit on 09/20/2023   Component Date Value Ref Range Status   • RESULT ALL_PRESC MEDS SP INSTRNS 09/20/2023 Not Applicable   Final    Comment:  4-ANPP: Fentanyl Negative. Acetyl fentanyl: Fentanyl Negative. Acetyl norfentan  yl: Fentanyl Negative. Acryl fentanyl: Fentanyl Negative. Carfentanil: Fentanyl   Negative. Para-fluorofentanyl: Fentanyl Negative.     • Amphetamine Quantification 09/20/2023 negative  100 ng/mL Final   • Methamphetamine Quantification 09/20/2023 negative  100 ng/mL Final   • Butalbital Quantification 09/20/2023 negative  200 ng/mL Final   • Phenobarbital Quantification 09/20/2023 negative  200 ng/mL Final   • Secobarbital Quantification 09/20/2023 negative  200 ng/mL Final   • Alpha-Hydroxyalprazolam Quantifica* 09/20/2023 positive-573.198-I (A)  20 ng/mL Final   • 7-Amino-Clonazepam Quantification * 09/20/2023 negative  20 ng/mL Final   • Nordiazepam Quantification 09/20/2023 negative  40 ng/mL Final   • Temazepam Quantification 09/20/2023 negative  50 ng/mL Final   • Oxazepam Quantification 09/20/2023 negative  40 ng/mL Final   • Lorazepam Quantification 09/20/2023 negative  40 ng/mL Final   • Gabapentin Quantification 09/20/2023 negative  1000 Final   • PREGABALIN QUANTIFICATION 09/20/2023 negative-I (A)  400 Final   • Cocaine metabolite Quantification 09/20/2023 negative  50 ng/mL Final   • 6-LEEANN  (Heroin metabolite) Quantifi* 09/20/2023 negative  10 ng/mL Final   • Buprenorphine Quantification 09/20/2023 negative  5 ng/mL Final   • Norbuprenorphine Quantification 09/20/2023 negative  20 ng/mL Final   • Dextrorphan (Dextromethorphan meta* 09/20/2023 negative  50 ng/mL Final   • Meperidine Quantification 09/20/2023 negative  50 ng/mL Final   • Normeperidine Quantification 09/20/2023 negative  50 ng/mL Final   • Naltrexone Quantification 09/20/2023 negative  10 ng/mL Final   • NALTREXOL (NALTREXONE METABOLITE) * 09/20/2023 negative  10 ng/mL Final   • Fentanyl Quantification 09/20/2023 negative  1 ng/mL Final   • Norfentanyl Quantification 09/20/2023 negative  8 ng/mL Final   • 4-ANPP Quantification 09/20/2023 Fen Neg  2 ng/mL Final   • Acetyl fentanyl Quantification 09/20/2023 Fen Neg  2 ng/mL Final   • Acetyl norfentanyl Quantification 09/20/2023 Fen Neg  5 ng/mL Final   • Acryl fentanyl Quantification 09/20/2023 Fen Neg  1 ng/mL Final   • Carfentanil Quantification 09/20/2023 Fen Neg  2 ng/mL Final   • Para-fluorofentanyl Quantification 09/20/2023 Fen Neg  1 ng/mL Final   • Methadone Quantification 09/20/2023 negative  100 ng/mL Final   • EDDP (Methadone metabolite) Quanti* 09/20/2023 negative  100 ng/mL Final   • Oxycodone Quantification 09/20/2023 positive-4612.905  50 ng/mL Final   • Noroxycodone Quantification 09/20/2023 positive-5244.238  50 ng/mL Final   • Oxymorphone Quantification 09/20/2023 positive-6534.695  50 ng/mL Final   • Tapentadol Quantification 09/20/2023 negative  50 ng/mL Final   • cTHC (Marijuana metabolite) Quanti* 09/20/2023 negative  15 ng/mL Final   • Tramadol Quantification 09/20/2023 negative  100 ng/mL Final   • O-desmethyl-tramadol Quantification 09/20/2023 negative  100 ng/mL Final   • N-DESMETHYL-TRAMADOL QUANTIFICATION 09/20/2023 negative  100 ng/mL Final   • Codeine Quantification 09/20/2023 negative  50 ng/mL Final   • Morphine Quantification 09/20/2023 negative  50 ng/mL  Final   • Hydrocodone Quantification 09/20/2023 negative  50 ng/mL Final   • Norhydrocodone Quantification 09/20/2023 negative  50 ng/mL Final   • Hydromorphone Quantification 09/20/2023 negative  50 ng/mL Final   • EUTYLONE QUANTIFICATION 09/20/2023 negative  10 ng/mL Final   • METHYLONE QUANTIFICATION 09/20/2023 negative  3 ng/mL Final   • Ethyl Glucuronide Quantification 09/20/2023 negative  500 ng/mL Final   • Ethyl Sulfate Quantification 09/20/2023 negative  500 ng/mL Final   • Mitragynine (Kratom alkaloid) Jemal* 09/20/2023 negative  1 ng/mL Final   • 8-KN-Xvwgyvszwrw (Kratom alkaloid)* 09/20/2023 negative  1 ng/mL Final   • 5F-ADB-M7 09/20/2023 negative  10 ng/mL Final   • OH-MXALYEIK-K6 METABOLITE QUANTIFI* 09/20/2023 negative  10 ng/mL Final   • UORZ-YVCDYEXI-F0 METABOLITE QUANTI* 09/20/2023 negative  10 ng/mL Final   • RBU926 metabolite Quantification 09/20/2023 negative  10 ng/mL Final   • FAS079 metabolite Quantification 09/20/2023 negative  10 ng/mL Final   • RCS4 METABOLITE QUANTIFICATION 09/20/2023 negative  10 ng/mL Final   • XLR11/ METABOLITE QUANTIFICAT* 09/20/2023 negative  10 ng/mL Final   • Methylphenidate Quantification 09/20/2023 negative  50 ng/mL Final   • RITALINIC ACID QUANTIFICATION 09/20/2023 negative  50 ng/mL Final   • PHENTERMINE QUANTIFICATION 09/20/2023 negative  50 ng/mL Final   • OXIDANT 09/20/2023 normal-0  <200 ug/mL ug/mL Final   • CREATININE 09/20/2023 normal-138.1  >20 mg/dL mg/dL Final   • pH 09/20/2023 normal-5.5  4.5 - 9.5 Final   • SPECIFIC GRAVITY URINE 09/20/2023 normal-1.015  1.003 - 1.035 Final         Patient Instructions   Obesity   AMBULATORY CARE:   Obesity  means your body mass index (BMI) is greater than 30. Your healthcare provider will use your age, height, and weight to measure your BMI.  The risks of obesity include  many health problems, including injuries or physical disability.  Diabetes (high blood sugar level)    High blood pressure or high  cholesterol    Heart disease or heart failure    Stroke    Gallbladder or liver disease    Cancer of the colon, breast, prostate, liver, or kidney    Sleep apnea    Arthritis or gout    Screening  is done to check for health conditions before you have signs or symptoms. If you are 35 to 70 years old, your blood sugar level may be checked every 3 years for signs of prediabetes or diabetes. Your healthcare provider will check your blood pressure at each visit. High blood pressure can lead to a stroke or other problems. Your provider may check for signs of heart disease, cancer, or other health problems.  Seek care immediately if:   You have a severe headache, confusion, or difficulty speaking.    You have weakness on one side of your body.    You have chest pain, sweating, or shortness of breath.    Call your doctor if:   You have symptoms of gallbladder or liver disease, such as pain in your upper abdomen.    You have knee or hip pain and discomfort while walking.    You have symptoms of diabetes, such as intense hunger and thirst, and frequent urination.    You have symptoms of sleep apnea, such as snoring or daytime sleepiness.    You have questions or concerns about your condition or care.    Treatment for obesity  focuses on helping you lose weight to improve your health. Even a small decrease in BMI can reduce the risk for many health problems. Your healthcare provider will help you set a weight-loss goal.  Lifestyle changes  are the first step in treating obesity. These include making healthy food choices and getting regular physical activity. Your healthcare provider may suggest a weight-loss program that involves coaching, education, and therapy.    Medicine  may help you lose weight when it is used with a healthy foods and physical activity.    Surgery  can help you lose weight if you have obesity along with other health problems. Several types of weight-loss surgery are available. Ask your healthcare  provider for more information.    Tips for safe weight loss:   Set small, realistic goals.  An example of a small goal is to walk for 20 minutes 5 days a week. Anther goal is to lose 5% of your body weight.    Ask for support.  Tell friends, family members, and coworkers about your goals. Ask someone to lose weight with you. You may also want to join a weight-loss support group.    Identify foods or triggers that may cause you to overeat.  Remove tempting high-calorie foods from your home and workplace. Place a bowl of fresh fruit on your kitchen counter. If stress causes you to eat, find other ways to cope with stress. A counselor or therapist may be able to help you.    Track your daily calories and activity.  Write down what you eat and drink. Also write down how many minutes of physical activity you do each day.     Track your weekly weight.  Weigh yourself in the morning, before you eat or drink anything but after you use the bathroom. Use the same scale, in the same place, and in similar clothing each time. Only weigh yourself 1 to 2 times each week, or as directed. You may become discouraged if you weigh yourself every day.    Eating changes:  You will need to eat 500 to 1,000 fewer calories each day than you currently eat to lose 1 to 2 pounds a week. The following changes will help you cut calories:  Eat smaller portions.  Use small plates, no larger than 9 inches in diameter. Fill your plate half full of fruits and vegetables. Measure your food using measuring cups until you know what a serving size looks like.         Eat 3 meals and 1 or 2 snacks each day.  Plan your meals in advance. Cook and eat at home most of the time. Eat slowly. Do not skip meals. Skipping meals can lead to overeating later in the day. This can make it harder for you to lose weight. Talk with a dietitian to help you make a meal plan and schedule that is right for you.    Eat fruits and vegetables at every meal.  They are low in  "calories and high in fiber, which makes you feel full. Do not add butter, margarine, or cream sauce to vegetables. Use herbs to season steamed vegetables.    Eat less fat and fewer fried foods.  Eat more baked or grilled chicken and fish. These protein sources are lower in calories and fat than red meat. Limit fast food. Dress your salads with olive oil and vinegar instead of bottled dressing.    Limit the amount of sugar you eat.  Do not drink sugary beverages. Limit alcohol.       Activity changes:  Physical activity is good for your body in many ways. It helps you burn calories and build strong muscles. It decreases stress and depression, and improves your mood. It can also help you sleep better. Talk to your healthcare provider before you begin an exercise program.  Exercise for at least 30 minutes 5 days a week.  Start slowly. Set aside time each day for physical activity that you enjoy and that is convenient for you. It is best to do both weight training and an activity that increases your heart rate, such as walking, bicycling, or swimming.            Find ways to be more active.  Do yard work and housecleaning. Walk up the stairs instead of using elevators. Spend your leisure time going to events that require walking, such as outdoor festivals or fairs. This extra physical activity can help you lose weight and keep it off.       Follow up with your doctor as directed:  You may need to meet with a dietitian. Write down your questions so you remember to ask them during your visits.  © Copyright [employer] 2023 Information is for End User's use only and may not be sold, redistributed or otherwise used for commercial purposes.  The above information is an  only. It is not intended as medical advice for individual conditions or treatments. Talk to your doctor, nurse or pharmacist before following any medical regimen to see if it is safe and effective for you.       Wilmar Bradford MD        \"This " "note has been constructed using a voice recognition system.Therefore there may be syntax, spelling, and/or grammatical errors. Please call if you have any questions. \"  "

## 2024-04-17 NOTE — ASSESSMENT & PLAN NOTE
Percocet 10-3 25 4 times a day was refilled this regimen helping patient control pain function and work full-time remaining follow-up plan finding counseling as follows        Percocet 10/325 4 times a day was refilled  All medical records reviewed and reconciled patient PDMP database reviewed to ensure compliant with the treatment plan for pain management benefits due to the fact that patient has not responded to other measures for pain control so far and severe the pain is significant and interfering with normal daily activities I believe it is reasonable and appropriate to continue the controlled substance in order to improve his ability to function in enhance quality of life the patient has signed a narcotic agreement patient should it was utilize 1 pharmacy and not receiving narcotic from any other provider patient verbalizes understanding that breaching the contract will affect future prescription decision making and critical results in the dismissal from the practice if specifically the has been explained in understood to patient patient demonstrates following informed use of appropriate medicine to analgesia increase activity explained the side effects recommended that thisto the patient patient understands that also advised that appeared to expose him order an abuse and addiction and overdose counseling provided for prevention of any overuse abuse or addiction

## 2024-04-18 ENCOUNTER — TELEPHONE (OUTPATIENT)
Dept: INTERNAL MEDICINE CLINIC | Facility: CLINIC | Age: 59
End: 2024-04-18

## 2024-04-18 NOTE — TELEPHONE ENCOUNTER
Wegovy 0.25mg / 0.5 ml auto-injector.    CoverMyMed Key: [id number]  Last Name: Edd  : 1965    Thank You !

## 2024-04-25 NOTE — TELEPHONE ENCOUNTER
PA for Wegovy     Submitted via    [x]CMM-KEY  [id number]  []SurescriBuildFax-Case ID #   []Faxed to plan   []Other website   []Phone call Case ID #     Office notes sent, clinical questions answered. Awaiting determination    Turnaround time for your insurance to make a decision on your Prior Authorization can take 7-21 business days.

## 2024-05-01 DIAGNOSIS — I10 BENIGN ESSENTIAL HYPERTENSION: ICD-10-CM

## 2024-05-01 RX ORDER — TRIAMTERENE AND HYDROCHLOROTHIAZIDE 37.5; 25 MG/1; MG/1
1 TABLET ORAL DAILY
Qty: 90 TABLET | Refills: 0 | Status: SHIPPED | OUTPATIENT
Start: 2024-05-01

## 2024-05-01 NOTE — TELEPHONE ENCOUNTER
Reason for call:   [x] Refill   [] Prior Auth  [] Other:     Office:   [x] PCP/Provider -   [] Specialty/Provider -     Medication: Triamterene-Hydrochlorothiazide     Dose/Frequency: 37.5-25 mg per tablet taken by mouth once daily     Quantity: 90    Pharmacy: [facility] - [place] - [address] [facility] [phone]     Does the patient have enough for 3 days?   [] Yes   [x] No - Send as HP to POD

## 2024-05-18 LAB
25(OH)D3+25(OH)D2 SERPL-MCNC: 33 NG/ML (ref 30–100)
ALBUMIN SERPL-MCNC: 4.2 G/DL (ref 3.5–5.7)
ALBUMIN/CREAT UR: NORMAL
ALP SERPL-CCNC: 96 U/L (ref 35–120)
ALT SERPL-CCNC: 16 U/L
ANION GAP SERPL CALCULATED.3IONS-SCNC: 9 MMOL/L (ref 3–11)
AST SERPL-CCNC: 17 U/L
BASOPHILS # BLD AUTO: 0.1 THOU/CMM (ref 0–0.1)
BASOPHILS NFR BLD AUTO: 1 %
BILIRUB SERPL-MCNC: 0.6 MG/DL (ref 0.2–1)
BUN SERPL-MCNC: 20 MG/DL (ref 7–28)
CALCIUM SERPL-MCNC: 9.1 MG/DL (ref 8.5–10.1)
CHLORIDE SERPL-SCNC: 103 MMOL/L (ref 100–109)
CHOLEST SERPL-MCNC: 115 MG/DL
CHOLEST/HDLC SERPL: 2.6 {RATIO}
CO2 SERPL-SCNC: 28 MMOL/L (ref 21–31)
CREAT SERPL-MCNC: 1.07 MG/DL (ref 0.53–1.3)
CREAT UR-MCNC: 76.8 MG/DL (ref 50–200)
CYTOLOGY CMNT CVX/VAG CYTO-IMP: ABNORMAL
DIFFERENTIAL METHOD BLD: ABNORMAL
EOSINOPHIL # BLD AUTO: 0.7 THOU/CMM (ref 0–0.5)
EOSINOPHIL NFR BLD AUTO: 12 %
ERYTHROCYTE [DISTWIDTH] IN BLOOD BY AUTOMATED COUNT: 15.4 % (ref 12–16)
EST. AVERAGE GLUCOSE BLD GHB EST-MCNC: 114 MG/DL
GFR/BSA.PRED SERPLBLD CYS-BASED-ARV: 80 ML/MIN/{1.73_M2}
GLUCOSE SERPL-MCNC: 103 MG/DL (ref 65–99)
GLUCOSE UR QL STRIP: NEGATIVE MG/DL
HBA1C MFR BLD: 5.6 %
HCT VFR BLD AUTO: 35.3 % (ref 37–48)
HDLC SERPL-MCNC: 44 MG/DL (ref 23–92)
HGB BLD-MCNC: 11.8 G/DL (ref 12.5–17)
HGB UR QL STRIP: NEGATIVE MG/DL
KETONES UR QL STRIP: NEGATIVE MG/DL
LDLC SERPL CALC-MCNC: 58 MG/DL
LEUKOCYTE ESTERASE UR QL STRIP: NEGATIVE /UL
LYMPHOCYTES # BLD AUTO: 1.8 THOU/CMM (ref 1–3)
LYMPHOCYTES NFR BLD AUTO: 31 %
MCH RBC QN AUTO: 24.6 PG (ref 27–36)
MCHC RBC AUTO-ENTMCNC: 33.5 G/DL (ref 32–37)
MCV RBC AUTO: 74 FL (ref 80–100)
MICROALBUMIN UR-MCNC: <0.7 MG/DL
MONOCYTES # BLD AUTO: 0.5 THOU/CMM (ref 0.3–1)
MONOCYTES NFR BLD AUTO: 9 %
NEUTROPHILS # BLD AUTO: 2.7 THOU/CMM (ref 1.8–7.8)
NEUTROPHILS NFR BLD AUTO: 47 %
NITRITE UR QL STRIP: NEGATIVE
NONHDLC SERPL-MCNC: 71 MG/DL
PH UR: 6 [PH] (ref 4.5–8)
PLATELET # BLD AUTO: 272 THOU/CMM (ref 140–350)
PMV BLD REES-ECKER: 10.2 FL (ref 7.5–11.3)
POTASSIUM SERPL-SCNC: 4.4 MMOL/L (ref 3.5–5.2)
PROT 24H UR-MRATE: NEGATIVE MG/DL
PROT SERPL-MCNC: 7 G/DL (ref 6.3–8.3)
RBC # BLD AUTO: 4.79 MILL/CMM (ref 4–5.4)
RBC #/AREA URNS HPF: NORMAL /HPF (ref 0–2)
SL AMB POCT URINE COMMENT: NORMAL
SODIUM SERPL-SCNC: 140 MMOL/L (ref 135–145)
SP GR UR: 1.01 (ref 1–1.03)
TRIGL SERPL-MCNC: 66 MG/DL
WBC # BLD AUTO: 5.7 THOU/CMM (ref 4–10.5)
WBC #/AREA URNS HPF: 0 /HPF (ref 0–5)

## 2024-05-29 ENCOUNTER — OFFICE VISIT (OUTPATIENT)
Dept: INTERNAL MEDICINE CLINIC | Facility: CLINIC | Age: 59
End: 2024-05-29
Payer: COMMERCIAL

## 2024-05-29 VITALS
HEART RATE: 81 BPM | HEIGHT: 71 IN | DIASTOLIC BLOOD PRESSURE: 80 MMHG | SYSTOLIC BLOOD PRESSURE: 110 MMHG | WEIGHT: 242 LBS | BODY MASS INDEX: 33.88 KG/M2 | OXYGEN SATURATION: 98 %

## 2024-05-29 DIAGNOSIS — M51.26 LUMBAR DISC HERNIATION: ICD-10-CM

## 2024-05-29 DIAGNOSIS — M48.061 LUMBAR FORAMINAL STENOSIS: ICD-10-CM

## 2024-05-29 DIAGNOSIS — M17.0 PRIMARY OSTEOARTHRITIS OF BOTH KNEES: Primary | ICD-10-CM

## 2024-05-29 DIAGNOSIS — M47.816 LUMBAR SPONDYLOSIS: ICD-10-CM

## 2024-05-29 PROCEDURE — 99213 OFFICE O/P EST LOW 20 MIN: CPT | Performed by: INTERNAL MEDICINE

## 2024-05-29 RX ORDER — OXYCODONE AND ACETAMINOPHEN 10; 325 MG/1; MG/1
1 TABLET ORAL EVERY 8 HOURS PRN
Qty: 90 TABLET | Refills: 0 | Status: SHIPPED | OUTPATIENT
Start: 2024-05-29

## 2024-05-29 RX ORDER — CELECOXIB 200 MG/1
200 CAPSULE ORAL 2 TIMES DAILY
Qty: 180 CAPSULE | Refills: 3 | Status: SHIPPED | OUTPATIENT
Start: 2024-05-29

## 2024-05-29 NOTE — LETTER
May 29, 2024     Patient: Ifeanyi Puga  YOB: 1965  Date of Visit: 5/29/2024      To Whom it May Concern:    Ifeanyi Puga is under my professional care. Ifeanyi was seen in my office on 5/29/2024. Ifeanyi is advised no work today there is May 29, 2024    If you have any questions or concerns, please don't hesitate to call.         Sincerely,          Wilmar Bradford MD        CC: No Recipients

## 2024-05-29 NOTE — ASSESSMENT & PLAN NOTE
Cassette 10-3 25 4 times a day was refilled regimen helping patient work full-time function carry out day-to-day activities        Percocet 10/325 4 times a day was refilled  All medical records reviewed and reconciled patient PDMP database reviewed to ensure compliant with the treatment plan for pain management benefits due to the fact that patient has not responded to other measures for pain control so far and severe the pain is significant and interfering with normal daily activities I believe it is reasonable and appropriate to continue the controlled substance in order to improve his ability to function in enhance quality of life the patient has signed a narcotic agreement patient should it was utilize 1 pharmacy and not receiving narcotic from any other provider patient verbalizes understanding that breaching the contract will affect future prescription decision making and critical results in the dismissal from the practice if specifically the has been explained in understood to patient patient demonstrates following informed use of appropriate medicine to analgesia increase activity explained the side effects recommended that thisto the patient patient understands that also advised that appeared to expose him order an abuse and addiction and overdose counseling provided for prevention of any overuse abuse or addiction

## 2024-05-29 NOTE — PROGRESS NOTES
Dr. Bradford's Office Visit Note  24     Ifeanyi Puga 58 y.o. male MRN: [id number]  : 1965    Assessment:     1. Primary osteoarthritis of both knees  Assessment & Plan:  Intractable pain both knee causing difficulty with walking underwent seen by orthopedist undergoing injection therapy for now cortisone injection although advised that eventually will do a knee replacement bilateral    Continue Celebrex 200 mg daily no side effects    For pain control pain management same is under stenosis lumbar spine and lumbar disc herniation  Orders:  -     oxyCODONE-acetaminophen (PERCOCET)  mg per tablet; Take 1 tablet by mouth every 8 (eight) hours as needed for severe pain Max Daily Amount: 3 tablets  -     celecoxib (CeleBREX) 200 mg capsule; Take 1 capsule (200 mg total) by mouth 2 (two) times a day  2. Lumbar foraminal stenosis  -     oxyCODONE-acetaminophen (PERCOCET)  mg per tablet; Take 1 tablet by mouth every 8 (eight) hours as needed for severe pain Max Daily Amount: 3 tablets  3. Lumbar disc herniation  Assessment & Plan:  Cassette 10-3 25 4 times a day was refilled regimen helping patient work full-time function carry out day-to-day activities        Percocet 10/325 4 times a day was refilled  All medical records reviewed and reconciled patient PDMP database reviewed to ensure compliant with the treatment plan for pain management benefits due to the fact that patient has not responded to other measures for pain control so far and severe the pain is significant and interfering with normal daily activities I believe it is reasonable and appropriate to continue the controlled substance in order to improve his ability to function in enhance quality of life the patient has signed a narcotic agreement patient should it was utilize 1 pharmacy and not receiving narcotic from any other provider patient verbalizes understanding that breaching the contract will affect future prescription decision making  and critical results in the dismissal from the practice if specifically the has been explained in understood to patient patient demonstrates following informed use of appropriate medicine to analgesia increase activity explained the side effects recommended that thisto the patient patient understands that also advised that appeared to expose him order an abuse and addiction and overdose counseling provided for prevention of any overuse abuse or addiction  Orders:  -     oxyCODONE-acetaminophen (PERCOCET)  mg per tablet; Take 1 tablet by mouth every 8 (eight) hours as needed for severe pain Max Daily Amount: 3 tablets  -     celecoxib (CeleBREX) 200 mg capsule; Take 1 capsule (200 mg total) by mouth 2 (two) times a day  4. Lumbar spondylosis  Assessment & Plan:  Known case of DDD, disc herniation, foraminal stenosis, osteoarthritis of the both knee, lumbar spondylosis.   He continues to have low back pain.  Patient continues to have bilateral radiculopathy.  Pain is moderately severe.  Remains on Percocet 3-4 times a day.  No abuse no dependency no side effect.  Pain not controlled with the Tylenol.  Patient continues to have tingling and numbness in the leg.  Known case of neuropathy.  Patient wants to continue to work.  He was advised surgery wants to work for 5 more years does not want to go for surgery..  No problem with the constipation.    Will continue percoet, lyrica, lexapro, celebrex,  No side effects or drug interaction     Percocet 10-3 25 4 times a day was refilled      SODIUM 142 08/30/2022    K 4.5 08/30/2022     08/30/2022    CO2 31 08/30/2022    AGAP 5 08/30/2022                                                                 Orders:  -     oxyCODONE-acetaminophen (PERCOCET)  mg per tablet; Take 1 tablet by mouth every 8 (eight) hours as needed for severe pain Max Daily Amount: 3 tablets        Discussion Summary and Plan:  Today's care plan and medications were reviewed with  patient in detail and all their questions answered to their satisfaction.    Chief Complaint   Patient presents with   • Follow-up     Review lab work.      Subjective:  Came in follow-up chronic medical condition especially management of chronic intractable pain and refill for Percocet 10-3 25 4 times a day this regimen helping patient work full-time and function carry out day-to-day activities seen by orthopedics for intractable knee pain both undergoing injection therapy some improvement eventually need a knee replacement bilateral        The following portions of the patient's history were reviewed and updated as appropriate: allergies, current medications, past family history, past medical history, past social history, past surgical history and problem list.    Review of Systems   Constitutional:  Positive for activity change. Negative for appetite change, chills, diaphoresis, fatigue, fever and unexpected weight change.   HENT:  Negative for congestion, dental problem, drooling, ear discharge, ear pain, facial swelling, hearing loss, mouth sores, nosebleeds, postnasal drip, rhinorrhea, sinus pressure, sneezing, sore throat, tinnitus, trouble swallowing and voice change.    Eyes:  Negative for photophobia, pain, discharge, redness, itching and visual disturbance.   Respiratory:  Negative for apnea, cough, choking, chest tightness, shortness of breath, wheezing and stridor.    Cardiovascular:  Negative for chest pain, palpitations and leg swelling.   Gastrointestinal:  Negative for abdominal distention, abdominal pain, anal bleeding, blood in stool, constipation, diarrhea, nausea, rectal pain and vomiting.   Endocrine: Negative for cold intolerance, heat intolerance, polydipsia, polyphagia and polyuria.   Genitourinary:  Positive for dysuria. Negative for decreased urine volume, difficulty urinating, enuresis, flank pain, frequency, genital sores, hematuria and urgency.   Musculoskeletal:  Positive for  arthralgias, back pain, gait problem and joint swelling. Negative for myalgias, neck pain and neck stiffness.   Skin:  Negative for color change, pallor, rash and wound.   Allergic/Immunologic: Negative.  Negative for environmental allergies, food allergies and immunocompromised state.   Neurological:  Negative for dizziness, tremors, seizures, syncope, facial asymmetry, speech difficulty, weakness, light-headedness, numbness and headaches.   Psychiatric/Behavioral:  Negative for agitation, behavioral problems, confusion, decreased concentration, dysphoric mood, hallucinations, self-injury, sleep disturbance and suicidal ideas. The patient is not nervous/anxious and is not hyperactive.          Historical Information   Patient Active Problem List   Diagnosis   • Benign essential hypertension   • Benign prostatic hyperplasia   • DDD (degenerative disc disease), lumbar   • Incomplete emptying of bladder   • Lumbar disc herniation   • Lumbar foraminal stenosis   • Neuropathy   • Chronic intractable pain   • Mild episode of recurrent major depressive disorder (HCC)   • Hematospermia   • Prostatitis   • Other male erectile dysfunction   • Annual physical exam   • Primary osteoarthritis of both knees   • Chronic cough   • Non-seasonal allergic rhinitis due to pollen   • Continuous opioid dependence (HCC)   • Pain   • Swelling   • Intermittent claudication due to atherosclerosis of artery of extremity (HCC)   • Lumbar spondylosis   • Gastric bypass status for obesity   • Morbid (severe) obesity due to excess calories (HCC)   • Uncomplicated opioid dependence (HCC)   • Allergic reaction   • Prediabetes   • Mixed hyperlipidemia     Past Medical History:   Diagnosis Date   • Abdominal pain    • Anxiety disorder    • Arthritis    • Back pain    • Dizziness    • Headache    • Hypertension    • Lightheadedness    • Obesity    • Palpitations    • SOB (shortness of breath)      Past Surgical History:   Procedure Laterality Date    • CHOLECYSTECTOMY     • GASTRIC BYPASS       Social History     Substance and Sexual Activity   Alcohol Use None    Comment: none per Trish     Social History     Substance and Sexual Activity   Drug Use Not on file    Comment: none per Trish     Social History     Tobacco Use   Smoking Status Never   Smokeless Tobacco Never     History reviewed. No pertinent family history.  Health Maintenance Due   Topic   • Hepatitis C Screening    • HIV Screening    • Zoster Vaccine (1 of 2)   • COVID-19 Vaccine (7 - 2023-24 season)   • Annual Physical       Meds/Allergies       Current Outpatient Medications:   •  amlodipine-olmesartan (Soni) 5-40 MG, Take 1 tablet by mouth daily, Disp: 90 tablet, Rfl: 2  •  celecoxib (CeleBREX) 200 mg capsule, Take 1 capsule (200 mg total) by mouth 2 (two) times a day, Disp: 180 capsule, Rfl: 3  •  cetirizine (ZyrTEC) 10 mg tablet, Take 1 tablet (10 mg total) by mouth daily, Disp: 30 tablet, Rfl: 2  •  escitalopram (LEXAPRO) 20 mg tablet, Take 1 tablet (20 mg total) by mouth daily, Disp: 90 tablet, Rfl: 0  •  fluticasone (FLONASE) 50 mcg/act nasal spray, 1 spray into each nostril daily, Disp: 18.2 mL, Rfl: 2  •  oxyCODONE-acetaminophen (PERCOCET)  mg per tablet, Take 1 tablet by mouth every 8 (eight) hours as needed for severe pain Max Daily Amount: 3 tablets, Disp: 90 tablet, Rfl: 0  •  pramipexole (MIRAPEX) 0.5 mg tablet, Take 1 tablet (0.5 mg total) by mouth daily at bedtime, Disp: 30 tablet, Rfl: 5  •  pregabalin (LYRICA) 100 mg capsule, Take 1 capsule (100 mg total) by mouth 2 (two) times a day, Disp: 180 capsule, Rfl: 2  •  sildenafil (Viagra) 100 mg tablet, Take 1 tablet (100 mg total) by mouth daily as needed for erectile dysfunction, Disp: 10 tablet, Rfl: 55  •  tamsulosin (FLOMAX) 0.4 mg, Take 1 capsule (0.4 mg total) by mouth daily with dinner, Disp: 90 capsule, Rfl: 2  •  triamterene-hydrochlorothiazide (MAXZIDE-25) 37.5-25 mg per tablet, Take 1 tablet by mouth daily,  "Disp: 90 tablet, Rfl: 0      Objective:    Vitals:   /80   Pulse 81   Ht 5' 11\" (1.803 m)   Wt 110 kg (242 lb)   SpO2 98%   BMI 33.75 kg/m²   Body mass index is 33.75 kg/m².  Vitals:    05/29/24 1420   Weight: 110 kg (242 lb)       Physical Exam  Vitals and nursing note reviewed.   Constitutional:       General: He is not in acute distress.     Appearance: He is well-developed. He is obese. He is not ill-appearing, toxic-appearing or diaphoretic.   HENT:      Head: Normocephalic and atraumatic.      Right Ear: External ear normal.      Left Ear: External ear normal.      Nose: Nose normal.      Mouth/Throat:      Pharynx: No oropharyngeal exudate.   Eyes:      General: Lids are normal. Lids are everted, no foreign bodies appreciated. No scleral icterus.        Right eye: No discharge.         Left eye: No discharge.      Conjunctiva/sclera: Conjunctivae normal.      Pupils: Pupils are equal, round, and reactive to light.   Neck:      Thyroid: No thyromegaly.      Vascular: Normal carotid pulses. No carotid bruit, hepatojugular reflux or JVD.      Trachea: No tracheal tenderness or tracheal deviation.   Cardiovascular:      Rate and Rhythm: Normal rate and regular rhythm.      Pulses: Normal pulses.      Heart sounds: Normal heart sounds. No murmur heard.     No friction rub. No gallop.   Pulmonary:      Effort: Pulmonary effort is normal. No respiratory distress.      Breath sounds: Normal breath sounds. No stridor. No wheezing or rales.   Chest:      Chest wall: No tenderness.   Abdominal:      General: Bowel sounds are normal. There is no distension.      Palpations: Abdomen is soft. There is no mass.      Tenderness: There is no abdominal tenderness. There is no guarding or rebound.   Musculoskeletal:         General: No tenderness or deformity. Normal range of motion.      Cervical back: Normal range of motion and neck supple. No edema, erythema or rigidity. No spinous process tenderness or muscular " tenderness. Normal range of motion.   Lymphadenopathy:      Head:      Right side of head: No submental, submandibular, tonsillar, preauricular or posterior auricular adenopathy.      Left side of head: No submental, submandibular, tonsillar, preauricular, posterior auricular or occipital adenopathy.      Cervical: No cervical adenopathy.      Right cervical: No superficial, deep or posterior cervical adenopathy.     Left cervical: No superficial, deep or posterior cervical adenopathy.      Upper Body:      Right upper body: No pectoral adenopathy.      Left upper body: No pectoral adenopathy.   Skin:     General: Skin is warm and dry.      Coloration: Skin is not pale.      Findings: No erythema or rash.   Neurological:      General: No focal deficit present.      Mental Status: He is alert and oriented to person, place, and time.      Cranial Nerves: No cranial nerve deficit.      Sensory: No sensory deficit.      Motor: No tremor, abnormal muscle tone or seizure activity.      Coordination: Coordination normal.      Gait: Gait normal.      Deep Tendon Reflexes: Reflexes are normal and symmetric. Reflexes normal.   Psychiatric:         Behavior: Behavior normal.         Thought Content: Thought content normal.         Judgment: Judgment normal.         Lab Review   Orders Only on 05/18/2024   Component Date Value Ref Range Status   • Creatinine(Crt),U 05/18/2024 76.8  50.0 - 200.0 mg/dL Final   • Albumin,U,Random 05/18/2024 <0.7  <3.0 mg/dL Final   • Microalb/Creat Ratio 05/18/2024 Unable to perform calculation.  <30.0 mg/gm CREA Final   Orders Only on 05/18/2024   Component Date Value Ref Range Status   • Hemoglobin 05/18/2024 11.8 (L)  12.5 - 17.0 g/dL Final   • HCT 05/18/2024 35.3 (L)  37.0 - 48.0 % Final   • White Blood Cell Count 05/18/2024 5.7  4.0 - 10.5 thou/cmm Final   • Red Blood Cell Count 05/18/2024 4.79  4.00 - 5.40 mill/cmm Final   • Platelet Count 05/18/2024 272  140 - 350 thou/cmm Final   • SL AMB  MPV 05/18/2024 10.2  7.5 - 11.3 fL Final   • MCV 05/18/2024 74 (L)  80 - 100 fL Final   • MCH 05/18/2024 24.6 (L)  27.0 - 36.0 pg Final   • MCHC 05/18/2024 33.5  32.0 - 37.0 g/dL Final   • RDW 05/18/2024 15.4  12.0 - 16.0 % Final   • Differential Type 05/18/2024 AUTO   Final   • Neutrophils (Absolute) 05/18/2024 2.7  1.8 - 7.8 thou/cmm Final   • Lymphocytes (Absolute) 05/18/2024 1.8  1.0 - 3.0 thou/cmm Final   • Monocytes (Absolute) 05/18/2024 0.5  0.3 - 1.0 thou/cmm Final   • Eosinophils (Absolute) 05/18/2024 0.7 (H)  0.0 - 0.5 thou/cmm Final   • Basophils ABS 05/18/2024 0.1  0.0 - 0.1 thou/cmm Final   • Neutrophils 05/18/2024 47  % Final   • Lymphocytes 05/18/2024 31  % Final   • Monocytes 05/18/2024 9  % Final   • Eosinophils 05/18/2024 12  % Final   • Basophils PCT 05/18/2024 1  % Final   • Specific Gravity Ur 05/18/2024 1.011  1.003 - 1.030 Final   • pH, Urine 05/18/2024 6.0  4.5 - 8.0 Final   • Protein, Urine 05/18/2024 Negative  NEG mg/dL Final   • Glucose, Urine 05/18/2024 Negative  NEG mg/dL Final   • Ketone, Urine 05/18/2024 Negative  NEG mg/dL Final   • Blood, Urine 05/18/2024 Negative  NEG mg/dL Final   • Leukocyte Esterase 05/18/2024 Negative  NEG /uL Final   • Nitrites Urine 05/18/2024 Negative  NEG Final   • POCT URINE COMMENT 05/18/2024 Not applicable   Final   • RBC, Urine 05/18/2024 0-2  0 - 2 /hpf Final   • SL AMB WBC, URINE 05/18/2024 0  0 - 5 /hpf Final   • Glucose, Random 05/18/2024 103 (H)  65 - 99 mg/dL Final   • BUN 05/18/2024 20  7 - 28 mg/dL Final   • Creatinine 05/18/2024 1.07  0.53 - 1.30 mg/dL Final   • Sodium 05/18/2024 140  135 - 145 mmol/L Final   • Potassium 05/18/2024 4.4  3.5 - 5.2 mmol/L Final   • Chloride 05/18/2024 103  100 - 109 mmol/L Final   • CO2 05/18/2024 28  21 - 31 mmol/L Final   • Calcium 05/18/2024 9.1  8.5 - 10.1 mg/dL Final   • Alkaline Phosphatase 05/18/2024 96  35 - 120 U/L Final   • Albumin 05/18/2024 4.2  3.5 - 5.7 g/dL Final   • TOTAL BILIRUBIN 05/18/2024 0.6   0.2 - 1.0 mg/dL Final    Comment: Eltrombopag and its metabolites may interfere with this assay causing   erroneously high patient results.     • Protein, Total 05/18/2024 7.0  6.3 - 8.3 g/dL Final   • AST 05/18/2024 17  <41 U/L Final   • ALT 05/18/2024 16  <56 U/L Final   • ANION GAP 05/18/2024 9  3 - 11 Final   • eGFR 05/18/2024 80  >59 Final   • Comment 05/18/2024 (Note)   Final    Comment:  Interpretive information: calculated GFR                                              Units: mL/min per 1.73 square meters   Reported eGFR is based on the CKD-EPI 2021 creatinine equation that   does not use a race coefficient and conforms to the NKF-ASN Task   Force Recommendations.   Note: Calculated GFR may not be an accurate indicator of renal   function if the patient's renal function is not in a steady state.                                              GFR Categories in Chronic Kidney Disease (CKD):   GFR        GFR (mL/min/1.73 square meters)   Category:                   Interpretation:   G1         90 or greater    Normal or high*   G2         60 - 89          Mild decrease*   G3a        45 - 59          Mild to moderate decrease   G3b        30 - 44          Moderate to severe decrease   G4         15 - 29          Severe decrease   G5         14 or less       Kidney failure                                              *In the absence of evidence of kidney damage, neither G                           FR category G1   or G2 fulfill the criteria for CKD. Kidney Int Suppl 2013, 3: 1-1 50.     • Cholesterol, Total 05/18/2024 115  <200 mg/dL Final   • Triglycerides 05/18/2024 66  <150 mg/dL Final   • HDL Cholesterol 05/18/2024 44  23 - 92 mg/dL Final   • Non HDL Chol. (LDL+VLDL) 05/18/2024 71  <160 mg/dL Final   • Chol HDLC Ratio 05/18/2024 2.6   Final   • LDL Calculated 05/18/2024 58  <130 mg/dL Final    Comment: LDL Cholesterol was calculated using the Friedewald equation. Direct   measurement of LDL is not indicated  for this patient based on [facility]'s   analytical algorithm for measurement of LDL Cholesterol.     • Hemoglobin A1C 05/18/2024 5.6  <5.7 % Final    Comment: Reference Range   Non-diabetic                     <5.7   Pre-diabetic                     5.7-6.4   Diabetic                         >=6.5   ADA target for diabetic control  <=7     • Estimated Average Glucose 05/18/2024 114  mg/dL Final   • 25-HYDROXY VIT D 05/18/2024 33  30 - 100 ng/mL Final    Comment: Interpretive information: Total Vitamin D, 25-Hydroxy                                              This assay quantifies the sum of vitamin D3,   25-hydroxy and vitamin D2, 25-hydroxy.                                                <10    ng/mL  Deficiency     10-30  ng/mL  Insufficiency      ng/mL  Sufficiency     >100   ng/mL  Toxicity           Patient Instructions   Osteoarthritis   WHAT YOU NEED TO KNOW:   Osteoarthritis (OA) occurs when cartilage (tissue that cushions a joint) wears away slowly and causes the bones to rub together. OA is a long-term condition that often affects the hands, neck, lower back, knees, and hips. OA is also called arthrosis or degenerative joint disease.  DISCHARGE INSTRUCTIONS:   Call your doctor or specialist if:   You have severe pain.    You cannot move your joint.    You have a fever.    Your joint is red and tender.    You have questions or concerns about your condition or care.    Medicines:  You may need any of the following:  Acetaminophen  decreases pain and fever. It is available without a doctor's order. Ask how much to take and how often to take it. Follow directions. Read the labels of all other medicines you are using to see if they also contain acetaminophen, or ask your doctor or pharmacist. Acetaminophen can cause liver damage if not taken correctly.    NSAIDs , such as ibuprofen, help decrease swelling, pain, and fever. This medicine is available with or without a doctor's order. NSAIDs can cause stomach  bleeding or kidney problems in certain people. If you take blood thinner medicine, always ask your healthcare provider if NSAIDs are safe for you. Always read the medicine label and follow directions.    Capsaicin cream  may help decrease pain in your joint.     Prescription pain medicine  may be given. Ask your healthcare provider how to take this medicine safely. Some prescription pain medicines contain acetaminophen. Do not take other medicines that contain acetaminophen without talking to your healthcare provider. Too much acetaminophen may cause liver damage. Prescription pain medicine may cause constipation. Ask your healthcare provider how to prevent or treat constipation.     Take your medicine as directed.  Contact your healthcare provider if you think your medicine is not helping or if you have side effects. Tell your provider if you are allergic to any medicine. Keep a list of the medicines, vitamins, and herbs you take. Include the amounts, and when and why you take them. Bring the list or the pill bottles to follow-up visits. Carry your medicine list with you in case of an emergency.    Follow up with your healthcare provider as directed:  Write down your questions so you remember to ask them during your visits.  Go to physical therapy as directed:  A physical therapist teaches you exercises to help improve movement and strength, and to decrease pain in your joints. The exercises also help lower your risk for loss of function. A physical therapist may move an area with his or her hands. For example, he or she may move your leg in certain ways to treat osteoarthritis in your hip.  Manage your symptoms:   Stay active.  Physical activity may reduce your pain and improve your ability to do daily activities. Avoid activities that cause pain. Ask your healthcare provider what type of exercise would be best for you.    Maintain a healthy weight.  This helps decrease the strain on the joints in your back, hips,  "knees, ankles, and feet. Ask your healthcare provider what a healthy weight is for you. He or she can help you create a weight loss plan if you are overweight.    Use heat or ice on your joints as directed.  Heat and ice help decrease pain, swelling, and muscle spasms. For heat, use a heating pad on a low setting for 20 minutes, or take a warm bath. For ice, use an ice pack, or put crushed ice in a plastic bag. Cover it with a towel before you place it on your joint. Use ice for 15 minutes every hour.    Massage the muscles around the joint.  Massage helps relieve pain and stiffness. Your healthcare provider or a physical therapist can show you how to do this. If you have hip OA, another person may need to help you massage the area.    Use a cane, crutches, or a walker if directed.  These help protect and relieve pressure on your ankle, knee, and hip joints. You may also be prescribed shoe inserts to decrease pressure in your joints.    Wear flat or low-heeled shoes.  This will help decrease pain and reduce pressure on your ankle, knee, and hip joints.    © Copyright [employer] 2023 Information is for End User's use only and may not be sold, redistributed or otherwise used for commercial purposes.  The above information is an  only. It is not intended as medical advice for individual conditions or treatments. Talk to your doctor, nurse or pharmacist before following any medical regimen to see if it is safe and effective for you.       Wilmar Bradford MD        \"This note has been constructed using a voice recognition system.Therefore there may be syntax, spelling, and/or grammatical errors. Please call if you have any questions. \"  "

## 2024-05-29 NOTE — ASSESSMENT & PLAN NOTE
Intractable pain both knee causing difficulty with walking underwent seen by orthopedist undergoing injection therapy for now cortisone injection although advised that eventually will do a knee replacement bilateral    Continue Celebrex 200 mg daily no side effects    For pain control pain management same is under stenosis lumbar spine and lumbar disc herniation

## 2024-05-29 NOTE — ASSESSMENT & PLAN NOTE
Known case of DDD, disc herniation, foraminal stenosis, osteoarthritis of the both knee, lumbar spondylosis.   He continues to have low back pain.  Patient continues to have bilateral radiculopathy.  Pain is moderately severe.  Remains on Percocet 3-4 times a day.  No abuse no dependency no side effect.  Pain not controlled with the Tylenol.  Patient continues to have tingling and numbness in the leg.  Known case of neuropathy.  Patient wants to continue to work.  He was advised surgery wants to work for 5 more years does not want to go for surgery..  No problem with the constipation.    Will continue percoet, lyrica, lexapro, celebrex,  No side effects or drug interaction     Percocet 10-3 25 4 times a day was refilled      SODIUM 142 08/30/2022    K 4.5 08/30/2022     08/30/2022    CO2 31 08/30/2022    AGAP 5 08/30/2022

## 2024-06-19 ENCOUNTER — OFFICE VISIT (OUTPATIENT)
Dept: INTERNAL MEDICINE CLINIC | Facility: CLINIC | Age: 59
End: 2024-06-19
Payer: COMMERCIAL

## 2024-06-19 VITALS
HEART RATE: 73 BPM | SYSTOLIC BLOOD PRESSURE: 112 MMHG | OXYGEN SATURATION: 98 % | HEIGHT: 71 IN | WEIGHT: 239 LBS | BODY MASS INDEX: 33.46 KG/M2 | DIASTOLIC BLOOD PRESSURE: 78 MMHG

## 2024-06-19 DIAGNOSIS — Z98.84 GASTRIC BYPASS STATUS FOR OBESITY: ICD-10-CM

## 2024-06-19 DIAGNOSIS — M17.0 PRIMARY OSTEOARTHRITIS OF BOTH KNEES: ICD-10-CM

## 2024-06-19 DIAGNOSIS — R73.03 PREDIABETES: ICD-10-CM

## 2024-06-19 DIAGNOSIS — M47.816 LUMBAR SPONDYLOSIS: ICD-10-CM

## 2024-06-19 DIAGNOSIS — I10 BENIGN ESSENTIAL HYPERTENSION: ICD-10-CM

## 2024-06-19 DIAGNOSIS — M48.061 LUMBAR FORAMINAL STENOSIS: Primary | ICD-10-CM

## 2024-06-19 DIAGNOSIS — E78.2 MIXED HYPERLIPIDEMIA: ICD-10-CM

## 2024-06-19 DIAGNOSIS — M51.26 LUMBAR DISC HERNIATION: ICD-10-CM

## 2024-06-19 PROCEDURE — 99214 OFFICE O/P EST MOD 30 MIN: CPT | Performed by: INTERNAL MEDICINE

## 2024-06-19 RX ORDER — OXYCODONE AND ACETAMINOPHEN 10; 325 MG/1; MG/1
1 TABLET ORAL EVERY 8 HOURS PRN
Qty: 90 TABLET | Refills: 0 | Status: SHIPPED | OUTPATIENT
Start: 2024-06-19

## 2024-06-19 NOTE — ASSESSMENT & PLAN NOTE
Lab Results   Component Value Date    LDLCALC 58 05/18/2024   LDL controlled on diet low-fat low-cholesterol diet when able to lose weight with diet exercise will monitor closely no need for statin

## 2024-06-19 NOTE — PROGRESS NOTES
Dr. Bradford's Office Visit Note  24     Ifeanyi Puga 58 y.o. male MRN: [id number]  : 1965    Assessment:     1. Lumbar foraminal stenosis  Assessment & Plan:  Same as under lumbar disc herniation Percocet was refilled  Orders:  -     oxyCODONE-acetaminophen (PERCOCET)  mg per tablet; Take 1 tablet by mouth every 8 (eight) hours as needed for severe pain Max Daily Amount: 3 tablets  2. Lumbar disc herniation  Assessment & Plan:  Percocet 10-3 25 4 times a day refilled this regimen helping patient function carry out day-to-day activities and work full-time        Percocet 10/325 4 times a day was refilled  All medical records reviewed and reconciled patient PDMP database reviewed to ensure compliant with the treatment plan for pain management benefits due to the fact that patient has not responded to other measures for pain control so far and severe the pain is significant and interfering with normal daily activities I believe it is reasonable and appropriate to continue the controlled substance in order to improve his ability to function in enhance quality of life the patient has signed a narcotic agreement patient should it was utilize 1 pharmacy and not receiving narcotic from any other provider patient verbalizes understanding that breaching the contract will affect future prescription decision making and critical results in the dismissal from the practice if specifically the has been explained in understood to patient patient demonstrates following informed use of appropriate medicine to analgesia increase activity explained the side effects recommended that thisto the patient patient understands that also advised that appeared to expose him order an abuse and addiction and overdose counseling provided for prevention of any overuse abuse or addiction  Orders:  -     oxyCODONE-acetaminophen (PERCOCET)  mg per tablet; Take 1 tablet by mouth every 8 (eight) hours as needed for severe pain Max  Daily Amount: 3 tablets  3. Primary osteoarthritis of both knees  -     oxyCODONE-acetaminophen (PERCOCET)  mg per tablet; Take 1 tablet by mouth every 8 (eight) hours as needed for severe pain Max Daily Amount: 3 tablets  4. Lumbar spondylosis  -     oxyCODONE-acetaminophen (PERCOCET)  mg per tablet; Take 1 tablet by mouth every 8 (eight) hours as needed for severe pain Max Daily Amount: 3 tablets  5. Mixed hyperlipidemia  Assessment & Plan:  Lab Results   Component Value Date    LDLCALC 58 05/18/2024   LDL controlled on diet low-fat low-cholesterol diet when able to lose weight with diet exercise will monitor closely no need for statin  6. Benign essential hypertension  Assessment & Plan:  Blood pressure reviewed elevated since patient has not taken any antihypertensive today    Agree and continue management as follows    Asymptomatic    Low-salt diet    Soni 5/40 daily  Patient monitoring blood pressure at home controlled asymptomatic    BMP reviewed as follows    Lab Results   Component Value Date    SODIUM 140 05/18/2024    K 4.4 05/18/2024     05/18/2024    CO2 28 05/18/2024    BUN 20 05/18/2024    CREATININE 1.07 05/18/2024    GLUC 103 (H) 05/18/2024    CALCIUM 9.1 05/18/2024        Maxide 37.5/25 daily  7. Prediabetes  Assessment & Plan:  Lab Results   Component Value Date    HGBA1C 5.6 05/18/2024   Patient's A1c much improved as above    Continue diabetic diet exercise diet lose weight  8. Gastric bypass status for obesity  Assessment & Plan:  left  Lab Results   Component Value Date    SODIUM 140 05/18/2024    K 4.4 05/18/2024     05/18/2024    CO2 28 05/18/2024    AGAP 9 05/18/2024    BUN 20 05/18/2024    CREATININE 1.07 05/18/2024    GLUC 103 (H) 05/18/2024    CALCIUM 9.1 05/18/2024    AST 17 05/18/2024    ALT 16 05/18/2024    ALKPHOS 96 05/18/2024    TP 7.0 05/18/2024    TBILI 0.6 05/18/2024    EGFR 80 05/18/2024   Above reviewed    To continue Pepcid 20 mg for GERD  symptoms    The labs reviewed as above unremarkable    No post gastric bypass status complication except gaining weight advised diet exercise        Discussion Summary and Plan:  Today's care plan and medications were reviewed with patient in detail and all their questions answered to their satisfaction.    Chief Complaint   Patient presents with   • Follow-up      Subjective:  Came in follow-up chronic medical condition listed visit diagnosis chronic interval pain management and refill for Percocet 10-3 25 4 times a day was refilled        The following portions of the patient's history were reviewed and updated as appropriate: allergies, current medications, past family history, past medical history, past social history, past surgical history and problem list.    Review of Systems   Constitutional:  Positive for activity change. Negative for appetite change, chills, diaphoresis, fatigue, fever and unexpected weight change.   HENT:  Negative for congestion, dental problem, drooling, ear discharge, ear pain, facial swelling, hearing loss, mouth sores, nosebleeds, postnasal drip, rhinorrhea, sinus pressure, sneezing, sore throat, tinnitus, trouble swallowing and voice change.    Eyes:  Negative for photophobia, pain, discharge, redness, itching and visual disturbance.   Respiratory:  Negative for apnea, cough, choking, chest tightness, shortness of breath, wheezing and stridor.    Cardiovascular:  Negative for chest pain, palpitations and leg swelling.   Gastrointestinal:  Negative for abdominal distention, abdominal pain, anal bleeding, blood in stool, constipation, diarrhea, nausea, rectal pain and vomiting.   Endocrine: Negative for cold intolerance, heat intolerance, polydipsia, polyphagia and polyuria.   Genitourinary:  Negative for decreased urine volume, difficulty urinating, dysuria, enuresis, flank pain, frequency, genital sores, hematuria and urgency.   Musculoskeletal:  Positive for arthralgias, back pain,  gait problem, joint swelling, myalgias and neck pain. Negative for neck stiffness.   Skin:  Negative for color change, pallor, rash and wound.   Allergic/Immunologic: Negative.  Negative for environmental allergies, food allergies and immunocompromised state.   Neurological:  Negative for dizziness, tremors, seizures, syncope, facial asymmetry, speech difficulty, weakness, light-headedness, numbness and headaches.   Psychiatric/Behavioral:  Negative for agitation, behavioral problems, confusion, decreased concentration, dysphoric mood, hallucinations, self-injury, sleep disturbance and suicidal ideas. The patient is not nervous/anxious and is not hyperactive.          Historical Information   Patient Active Problem List   Diagnosis   • Benign essential hypertension   • Benign prostatic hyperplasia   • DDD (degenerative disc disease), lumbar   • Incomplete emptying of bladder   • Lumbar disc herniation   • Lumbar foraminal stenosis   • Neuropathy   • Chronic intractable pain   • Mild episode of recurrent major depressive disorder (HCC)   • Hematospermia   • Prostatitis   • Other male erectile dysfunction   • Annual physical exam   • Primary osteoarthritis of both knees   • Chronic cough   • Non-seasonal allergic rhinitis due to pollen   • Continuous opioid dependence ([facility])   • Pain   • Swelling   • Intermittent claudication due to atherosclerosis of artery of extremity ([facility])   • Lumbar spondylosis   • Gastric bypass status for obesity   • Morbid (severe) obesity due to excess calories ([facility])   • Uncomplicated opioid dependence ([facility])   • Allergic reaction   • Prediabetes   • Mixed hyperlipidemia     Past Medical History:   Diagnosis Date   • Abdominal pain    • Anxiety disorder    • Arthritis    • Back pain    • Dizziness    • Headache    • Hypertension    • Lightheadedness    • Obesity    • Palpitations    • SOB (shortness of breath)      Past Surgical History:   Procedure Laterality Date   • CHOLECYSTECTOMY     •  GASTRIC BYPASS       Social History     Substance and Sexual Activity   Alcohol Use None    Comment: none per Spicer     Social History     Substance and Sexual Activity   Drug Use Not on file    Comment: none per Trish     Social History     Tobacco Use   Smoking Status Never   Smokeless Tobacco Never     History reviewed. No pertinent family history.  Health Maintenance Due   Topic   • Hepatitis C Screening    • HIV Screening    • Zoster Vaccine (1 of 2)   • COVID-19 Vaccine (7 - 2023-24 season)   • Annual Physical       Meds/Allergies       Current Outpatient Medications:   •  amlodipine-olmesartan (Soni) 5-40 MG, Take 1 tablet by mouth daily, Disp: 90 tablet, Rfl: 2  •  celecoxib (CeleBREX) 200 mg capsule, Take 1 capsule (200 mg total) by mouth 2 (two) times a day, Disp: 180 capsule, Rfl: 3  •  cetirizine (ZyrTEC) 10 mg tablet, Take 1 tablet (10 mg total) by mouth daily, Disp: 30 tablet, Rfl: 2  •  escitalopram (LEXAPRO) 20 mg tablet, Take 1 tablet (20 mg total) by mouth daily, Disp: 90 tablet, Rfl: 0  •  fluticasone (FLONASE) 50 mcg/act nasal spray, 1 spray into each nostril daily, Disp: 18.2 mL, Rfl: 2  •  oxyCODONE-acetaminophen (PERCOCET)  mg per tablet, Take 1 tablet by mouth every 8 (eight) hours as needed for severe pain Max Daily Amount: 3 tablets, Disp: 90 tablet, Rfl: 0  •  pramipexole (MIRAPEX) 0.5 mg tablet, Take 1 tablet (0.5 mg total) by mouth daily at bedtime, Disp: 30 tablet, Rfl: 5  •  pregabalin (LYRICA) 100 mg capsule, Take 1 capsule (100 mg total) by mouth 2 (two) times a day, Disp: 180 capsule, Rfl: 2  •  sildenafil (Viagra) 100 mg tablet, Take 1 tablet (100 mg total) by mouth daily as needed for erectile dysfunction, Disp: 10 tablet, Rfl: 55  •  tamsulosin (FLOMAX) 0.4 mg, Take 1 capsule (0.4 mg total) by mouth daily with dinner, Disp: 90 capsule, Rfl: 2  •  triamterene-hydrochlorothiazide (MAXZIDE-25) 37.5-25 mg per tablet, Take 1 tablet by mouth daily, Disp: 90 tablet, Rfl:  "0      Objective:    Vitals:   /78   Pulse 73   Ht 5' 11\" (1.803 m)   Wt 108 kg (239 lb)   SpO2 98%   BMI 33.33 kg/m²   Body mass index is 33.33 kg/m².  Vitals:    06/19/24 1338   Weight: 108 kg (239 lb)       Physical Exam  Vitals and nursing note reviewed.   Constitutional:       General: He is not in acute distress.     Appearance: He is well-developed. He is not ill-appearing, toxic-appearing or diaphoretic.   HENT:      Head: Normocephalic and atraumatic.      Right Ear: External ear normal.      Left Ear: External ear normal.      Nose: Nose normal.      Mouth/Throat:      Pharynx: No oropharyngeal exudate.   Eyes:      General: Lids are normal. Lids are everted, no foreign bodies appreciated. No scleral icterus.        Right eye: No discharge.         Left eye: No discharge.      Conjunctiva/sclera: Conjunctivae normal.      Pupils: Pupils are equal, round, and reactive to light.   Neck:      Thyroid: No thyromegaly.      Vascular: Normal carotid pulses. No carotid bruit, hepatojugular reflux or JVD.      Trachea: No tracheal tenderness or tracheal deviation.   Cardiovascular:      Rate and Rhythm: Normal rate and regular rhythm.      Pulses: Normal pulses.      Heart sounds: Normal heart sounds. No murmur heard.     No friction rub. No gallop.   Pulmonary:      Effort: Pulmonary effort is normal. No respiratory distress.      Breath sounds: Normal breath sounds. No stridor. No wheezing or rales.   Chest:      Chest wall: No tenderness.   Abdominal:      General: Bowel sounds are normal. There is no distension.      Palpations: Abdomen is soft. There is no mass.      Tenderness: There is no abdominal tenderness. There is no guarding or rebound.   Musculoskeletal:         General: No tenderness or deformity. Normal range of motion.      Cervical back: Normal range of motion and neck supple. No edema, erythema or rigidity. No spinous process tenderness or muscular tenderness. Normal range of motion. "   Lymphadenopathy:      Head:      Right side of head: No submental, submandibular, tonsillar, preauricular or posterior auricular adenopathy.      Left side of head: No submental, submandibular, tonsillar, preauricular, posterior auricular or occipital adenopathy.      Cervical: No cervical adenopathy.      Right cervical: No superficial, deep or posterior cervical adenopathy.     Left cervical: No superficial, deep or posterior cervical adenopathy.      Upper Body:      Right upper body: No pectoral adenopathy.      Left upper body: No pectoral adenopathy.   Skin:     General: Skin is warm and dry.      Coloration: Skin is not pale.      Findings: No erythema or rash.   Neurological:      General: No focal deficit present.      Mental Status: He is alert and oriented to person, place, and time.      Cranial Nerves: No cranial nerve deficit.      Sensory: No sensory deficit.      Motor: No tremor, abnormal muscle tone or seizure activity.      Coordination: Coordination normal.      Gait: Gait abnormal.      Deep Tendon Reflexes: Reflexes are normal and symmetric. Reflexes normal.   Psychiatric:         Behavior: Behavior normal.         Thought Content: Thought content normal.         Judgment: Judgment normal.         Lab Review   Orders Only on 05/18/2024   Component Date Value Ref Range Status   • Creatinine(Crt),U 05/18/2024 76.8  50.0 - 200.0 mg/dL Final   • Albumin,U,Random 05/18/2024 <0.7  <3.0 mg/dL Final   • Microalb/Creat Ratio 05/18/2024 Unable to perform calculation.  <30.0 mg/gm CREA Final   Orders Only on 05/18/2024   Component Date Value Ref Range Status   • Hemoglobin 05/18/2024 11.8 (L)  12.5 - 17.0 g/dL Final   • HCT 05/18/2024 35.3 (L)  37.0 - 48.0 % Final   • White Blood Cell Count 05/18/2024 5.7  4.0 - 10.5 thou/cmm Final   • Red Blood Cell Count 05/18/2024 4.79  4.00 - 5.40 mill/cmm Final   • Platelet Count 05/18/2024 272  140 - 350 thou/cmm Final   • SL AMB MPV 05/18/2024 10.2  7.5 - 11.3 fL  Final   • MCV 05/18/2024 74 (L)  80 - 100 fL Final   • MCH 05/18/2024 24.6 (L)  27.0 - 36.0 pg Final   • MCHC 05/18/2024 33.5  32.0 - 37.0 g/dL Final   • RDW 05/18/2024 15.4  12.0 - 16.0 % Final   • Differential Type 05/18/2024 AUTO   Final   • Neutrophils (Absolute) 05/18/2024 2.7  1.8 - 7.8 thou/cmm Final   • Lymphocytes (Absolute) 05/18/2024 1.8  1.0 - 3.0 thou/cmm Final   • Monocytes (Absolute) 05/18/2024 0.5  0.3 - 1.0 thou/cmm Final   • Eosinophils (Absolute) 05/18/2024 0.7 (H)  0.0 - 0.5 thou/cmm Final   • Basophils ABS 05/18/2024 0.1  0.0 - 0.1 thou/cmm Final   • Neutrophils 05/18/2024 47  % Final   • Lymphocytes 05/18/2024 31  % Final   • Monocytes 05/18/2024 9  % Final   • Eosinophils 05/18/2024 12  % Final   • Basophils PCT 05/18/2024 1  % Final   • Specific Gravity Ur 05/18/2024 1.011  1.003 - 1.030 Final   • pH, Urine 05/18/2024 6.0  4.5 - 8.0 Final   • Protein, Urine 05/18/2024 Negative  NEG mg/dL Final   • Glucose, Urine 05/18/2024 Negative  NEG mg/dL Final   • Ketone, Urine 05/18/2024 Negative  NEG mg/dL Final   • Blood, Urine 05/18/2024 Negative  NEG mg/dL Final   • Leukocyte Esterase 05/18/2024 Negative  NEG /uL Final   • Nitrites Urine 05/18/2024 Negative  NEG Final   • POCT URINE COMMENT 05/18/2024 Not applicable   Final   • RBC, Urine 05/18/2024 0-2  0 - 2 /hpf Final   • SL AMB WBC, URINE 05/18/2024 0  0 - 5 /hpf Final   • Glucose, Random 05/18/2024 103 (H)  65 - 99 mg/dL Final   • BUN 05/18/2024 20  7 - 28 mg/dL Final   • Creatinine 05/18/2024 1.07  0.53 - 1.30 mg/dL Final   • Sodium 05/18/2024 140  135 - 145 mmol/L Final   • Potassium 05/18/2024 4.4  3.5 - 5.2 mmol/L Final   • Chloride 05/18/2024 103  100 - 109 mmol/L Final   • CO2 05/18/2024 28  21 - 31 mmol/L Final   • Calcium 05/18/2024 9.1  8.5 - 10.1 mg/dL Final   • Alkaline Phosphatase 05/18/2024 96  35 - 120 U/L Final   • Albumin 05/18/2024 4.2  3.5 - 5.7 g/dL Final   • TOTAL BILIRUBIN 05/18/2024 0.6  0.2 - 1.0 mg/dL Final    Comment:  Eltrombopag and its metabolites may interfere with this assay causing   erroneously high patient results.     • Protein, Total 05/18/2024 7.0  6.3 - 8.3 g/dL Final   • AST 05/18/2024 17  <41 U/L Final   • ALT 05/18/2024 16  <56 U/L Final   • ANION GAP 05/18/2024 9  3 - 11 Final   • eGFR 05/18/2024 80  >59 Final   • Comment 05/18/2024 (Note)   Final    Comment:  Interpretive information: calculated GFR                                              Units: mL/min per 1.73 square meters   Reported eGFR is based on the CKD-EPI 2021 creatinine equation that   does not use a race coefficient and conforms to the NKF-ASN Task   Force Recommendations.   Note: Calculated GFR may not be an accurate indicator of renal   function if the patient's renal function is not in a steady state.                                              GFR Categories in Chronic Kidney Disease (CKD):   GFR        GFR (mL/min/1.73 square meters)   Category:                   Interpretation:   G1         90 or greater    Normal or high*   G2         60 - 89          Mild decrease*   G3a        45 - 59          Mild to moderate decrease   G3b        30 - 44          Moderate to severe decrease   G4         15 - 29          Severe decrease   G5         14 or less       Kidney failure                                              *In the absence of evidence of kidney damage, neither G                           FR category G1   or G2 fulfill the criteria for CKD. Kidney Int Suppl 2013, 3: 1-1 50.     • Cholesterol, Total 05/18/2024 115  <200 mg/dL Final   • Triglycerides 05/18/2024 66  <150 mg/dL Final   • HDL Cholesterol 05/18/2024 44  23 - 92 mg/dL Final   • Non HDL Chol. (LDL+VLDL) 05/18/2024 71  <160 mg/dL Final   • Chol HDLC Ratio 05/18/2024 2.6   Final   • LDL Calculated 05/18/2024 58  <130 mg/dL Final    Comment: LDL Cholesterol was calculated using the Friedewald equation. Direct   measurement of LDL is not indicated for this patient based on [facility]'s    analytical algorithm for measurement of LDL Cholesterol.     • Hemoglobin A1C 05/18/2024 5.6  <5.7 % Final    Comment: Reference Range   Non-diabetic                     <5.7   Pre-diabetic                     5.7-6.4   Diabetic                         >=6.5   ADA target for diabetic control  <=7     • Estimated Average Glucose 05/18/2024 114  mg/dL Final   • 25-HYDROXY VIT D 05/18/2024 33  30 - 100 ng/mL Final    Comment: Interpretive information: Total Vitamin D, 25-Hydroxy                                              This assay quantifies the sum of vitamin D3,   25-hydroxy and vitamin D2, 25-hydroxy.                                                <10    ng/mL  Deficiency     10-30  ng/mL  Insufficiency      ng/mL  Sufficiency     >100   ng/mL  Toxicity           Patient Instructions   Chronic Pain   WHAT YOU NEED TO KNOW:   Chronic pain is pain that does not get better for 3 months or longer. Chronic pain may hurt all the time, or come and go.  DISCHARGE INSTRUCTIONS:   Call your local emergency number, or have someone else call (911 in the [place]) if:   You are breathing slower than normal, or you have trouble breathing.    You cannot be awakened.    You have a seizure.    Call your doctor if:   Your heart feels like it is jumping or fluttering.    You cannot think clearly.    You have side effects from prescription pain medicine, such as itching, nausea, or vomiting.    You have trouble sleeping.    Your pain gets worse, even after you take medicine.    You don't think the medicine is working.    You have questions or concerns about your condition or care.    Medicines:  You may need any of the following:  Acetaminophen  decreases pain and fever. It is available without a doctor's order. Ask how much to take and how often to take it. Follow directions. Read the labels of all other medicines you are using to see if they also contain acetaminophen, or ask your doctor or pharmacist. Acetaminophen can cause  liver damage if not taken correctly.    NSAIDs , such as ibuprofen, help decrease swelling, pain, and fever. This medicine is available with or without a doctor's order. NSAIDs can cause stomach bleeding or kidney problems in certain people. If you take blood thinner medicine, always ask your healthcare provider if NSAIDs are safe for you. Always read the medicine label and follow directions.    Prescription pain medicine  called narcotics or opioids may be given for certain types of chronic pain. Ask your healthcare provider how to take this medicine safely.    Anesthetics  can be rubbed on your skin or injected into a nerve or muscle to numb an area.    Other medicines  may reduce pain, anxiety, muscle tension, or swelling.    Take your medicine as directed.  Contact your healthcare provider if you think your medicine is not helping or if you have side effects. Tell your provider if you are allergic to any medicine. Keep a list of the medicines, vitamins, and herbs you take. Include the amounts, and when and why you take them. Bring the list or the pill bottles to follow-up visits. Carry your medicine list with you in case of an emergency.    Manage your chronic pain:   Apply heat  on the area in pain for 20 to 30 minutes every 2 hours for as many days as directed. Heat helps decrease pain and muscle spasms.    Apply ice  on the part of your body that hurts for 15 to 20 minutes every hour or as directed. Use an ice pack, or put crushed ice in a plastic bag. Cover it with a towel. Ice decreases pain and swelling, and helps prevent tissue damage.    Go to physical therapy as directed.  A physical therapist teaches you exercises to help improve movement and strength, and to decrease pain.    Exercise for 30 minutes, 3 times a week.  Regular physical activity can help decrease pain and improve your quality of life. Ask your healthcare provider about the best exercise plan for your type of pain.    Get enough sleep.   "Create a relaxing bedtime routine. Go to sleep and wake up at the same time every day. Avoid caffeine in the afternoon.    Talk with a counselor or therapist.  A type of counseling called cognitive behavioral therapy (CBT) can help your chronic pain by changing the way you think about it. CBT can also improve your mood, sleep, and ability to move.    What you must know if you take narcotic pain medicine:   You may need to take a bowel movement softener.  The most common side effect of prescription pain medicine is constipation. Bowel movement softeners are available over the counter.    Do not mix prescription pain medicines.  This can cause an overdose of medicine, which can become life-threatening. Read labels. Make sure you know the ingredients in all of your medicines.    Do not drink alcohol  when you take prescription pain medicine. It is not safe to mix narcotics or opioids with alcohol or illegal drugs.    Prescription pain medicine may impair your ability to drive or work safely.  They may also cause dizziness and increase your risk for falling.    Store prescription pain medicine in a safe location at home.  Keep your medicine away from children and other people. Never share your medicine with anyone.    Follow up with your doctor as directed:  You may be referred to a pain specialist. Write down your questions so you remember to ask them during your visits.  © Copyright [employer] 2023 Information is for End User's use only and may not be sold, redistributed or otherwise used for commercial purposes.  The above information is an  only. It is not intended as medical advice for individual conditions or treatments. Talk to your doctor, nurse or pharmacist before following any medical regimen to see if it is safe and effective for you.       Wilmar Bradford MD        \"This note has been constructed using a voice recognition system.Therefore there may be syntax, spelling, and/or grammatical " "errors. Please call if you have any questions. \"  "

## 2024-06-19 NOTE — ASSESSMENT & PLAN NOTE
Percocet 10-3 25 4 times a day refilled this regimen helping patient function carry out day-to-day activities and work full-time        Percocet 10/325 4 times a day was refilled  All medical records reviewed and reconciled patient PDMP database reviewed to ensure compliant with the treatment plan for pain management benefits due to the fact that patient has not responded to other measures for pain control so far and severe the pain is significant and interfering with normal daily activities I believe it is reasonable and appropriate to continue the controlled substance in order to improve his ability to function in enhance quality of life the patient has signed a narcotic agreement patient should it was utilize 1 pharmacy and not receiving narcotic from any other provider patient verbalizes understanding that breaching the contract will affect future prescription decision making and critical results in the dismissal from the practice if specifically the has been explained in understood to patient patient demonstrates following informed use of appropriate medicine to analgesia increase activity explained the side effects recommended that thisto the patient patient understands that also advised that appeared to expose him order an abuse and addiction and overdose counseling provided for prevention of any overuse abuse or addiction

## 2024-06-22 NOTE — ASSESSMENT & PLAN NOTE
Blood pressure reviewed elevated since patient has not taken any antihypertensive today    Agree and continue management as follows    Asymptomatic    Low-salt diet    Soni 5/40 daily  Patient monitoring blood pressure at home controlled asymptomatic    BMP reviewed as follows    Lab Results   Component Value Date    SODIUM 140 05/18/2024    K 4.4 05/18/2024     05/18/2024    CO2 28 05/18/2024    BUN 20 05/18/2024    CREATININE 1.07 05/18/2024    GLUC 103 (H) 05/18/2024    CALCIUM 9.1 05/18/2024        Maxide 37.5/25 daily

## 2024-06-22 NOTE — ASSESSMENT & PLAN NOTE
left  Lab Results   Component Value Date    SODIUM 140 05/18/2024    K 4.4 05/18/2024     05/18/2024    CO2 28 05/18/2024    AGAP 9 05/18/2024    BUN 20 05/18/2024    CREATININE 1.07 05/18/2024    GLUC 103 (H) 05/18/2024    CALCIUM 9.1 05/18/2024    AST 17 05/18/2024    ALT 16 05/18/2024    ALKPHOS 96 05/18/2024    TP 7.0 05/18/2024    TBILI 0.6 05/18/2024    EGFR 80 05/18/2024   Above reviewed    To continue Pepcid 20 mg for GERD symptoms    The labs reviewed as above unremarkable    No post gastric bypass status complication except gaining weight advised diet exercise

## 2024-07-17 ENCOUNTER — OFFICE VISIT (OUTPATIENT)
Dept: INTERNAL MEDICINE CLINIC | Facility: CLINIC | Age: 59
End: 2024-07-17
Payer: COMMERCIAL

## 2024-07-17 VITALS
BODY MASS INDEX: 32.76 KG/M2 | OXYGEN SATURATION: 97 % | HEART RATE: 76 BPM | DIASTOLIC BLOOD PRESSURE: 80 MMHG | WEIGHT: 234 LBS | HEIGHT: 71 IN | SYSTOLIC BLOOD PRESSURE: 126 MMHG

## 2024-07-17 DIAGNOSIS — F33.9 EPISODE OF RECURRENT MAJOR DEPRESSIVE DISORDER, UNSPECIFIED DEPRESSION EPISODE SEVERITY (HCC): ICD-10-CM

## 2024-07-17 DIAGNOSIS — I10 BENIGN ESSENTIAL HYPERTENSION: ICD-10-CM

## 2024-07-17 DIAGNOSIS — M47.816 LUMBAR SPONDYLOSIS: ICD-10-CM

## 2024-07-17 DIAGNOSIS — G25.81 RESTLESS LEG: ICD-10-CM

## 2024-07-17 DIAGNOSIS — N40.0 BENIGN PROSTATIC HYPERPLASIA, UNSPECIFIED WHETHER LOWER URINARY TRACT SYMPTOMS PRESENT: Primary | ICD-10-CM

## 2024-07-17 DIAGNOSIS — G89.29 CHRONIC INTRACTABLE PAIN: ICD-10-CM

## 2024-07-17 DIAGNOSIS — M17.0 PRIMARY OSTEOARTHRITIS OF BOTH KNEES: ICD-10-CM

## 2024-07-17 DIAGNOSIS — M48.061 LUMBAR FORAMINAL STENOSIS: ICD-10-CM

## 2024-07-17 DIAGNOSIS — G62.9 NEUROPATHY: ICD-10-CM

## 2024-07-17 DIAGNOSIS — M51.26 LUMBAR DISC HERNIATION: ICD-10-CM

## 2024-07-17 PROCEDURE — 99214 OFFICE O/P EST MOD 30 MIN: CPT | Performed by: INTERNAL MEDICINE

## 2024-07-17 RX ORDER — TAMSULOSIN HYDROCHLORIDE 0.4 MG/1
0.4 CAPSULE ORAL
Qty: 90 CAPSULE | Refills: 2 | Status: SHIPPED | OUTPATIENT
Start: 2024-07-17

## 2024-07-17 RX ORDER — CELECOXIB 200 MG/1
200 CAPSULE ORAL 2 TIMES DAILY
Qty: 180 CAPSULE | Refills: 3 | Status: SHIPPED | OUTPATIENT
Start: 2024-07-17

## 2024-07-17 RX ORDER — TRIAMTERENE AND HYDROCHLOROTHIAZIDE 37.5; 25 MG/1; MG/1
1 TABLET ORAL DAILY
Qty: 90 TABLET | Refills: 0 | Status: SHIPPED | OUTPATIENT
Start: 2024-07-17

## 2024-07-17 RX ORDER — AMLODIPINE AND OLMESARTAN MEDOXOMIL 5; 40 MG/1; MG/1
1 TABLET ORAL DAILY
Qty: 90 TABLET | Refills: 2 | Status: SHIPPED | OUTPATIENT
Start: 2024-07-17

## 2024-07-17 RX ORDER — ESCITALOPRAM OXALATE 20 MG/1
20 TABLET ORAL DAILY
Qty: 90 TABLET | Refills: 3 | Status: SHIPPED | OUTPATIENT
Start: 2024-07-17

## 2024-07-17 RX ORDER — PRAMIPEXOLE DIHYDROCHLORIDE 0.5 MG/1
0.5 TABLET ORAL
Qty: 90 TABLET | Refills: 3 | Status: SHIPPED | OUTPATIENT
Start: 2024-07-17

## 2024-07-17 RX ORDER — PREGABALIN 100 MG/1
100 CAPSULE ORAL 2 TIMES DAILY
Qty: 180 CAPSULE | Refills: 2 | Status: SHIPPED | OUTPATIENT
Start: 2024-07-17

## 2024-07-17 RX ORDER — OXYCODONE AND ACETAMINOPHEN 10; 325 MG/1; MG/1
1 TABLET ORAL EVERY 8 HOURS PRN
Qty: 90 TABLET | Refills: 0 | Status: SHIPPED | OUTPATIENT
Start: 2024-07-17

## 2024-07-17 NOTE — ASSESSMENT & PLAN NOTE
Symptom frequency has failed nighttime followed by urology underwent cystoscopy for now continue Flomax 0.4 mg bedtime symptoms somewhat improved

## 2024-07-17 NOTE — ASSESSMENT & PLAN NOTE
Symptoms of restless legs very well-controlled with Mirapex    Continue manage medication as follows    Mirapex 0.5 mg at bedtime

## 2024-07-17 NOTE — PROGRESS NOTES
Dr. Bradford's Office Visit Note  24     Ifeanyi Puga 58 y.o. male MRN: [id number]  : 1965    Assessment:     1. Benign prostatic hyperplasia, unspecified whether lower urinary tract symptoms present  Assessment & Plan:  Symptom frequency has failed nighttime followed by urology underwent cystoscopy for now continue Flomax 0.4 mg bedtime symptoms somewhat improved  Orders:  -     tamsulosin (FLOMAX) 0.4 mg; Take 1 capsule (0.4 mg total) by mouth daily with dinner  2. Lumbar disc herniation  Assessment & Plan:  Percocet 10-3 25 4 times a day was refilled remaining follow-up plan finding as follows regimen helping patient control pain function and work full-time        Percocet 10/325 4 times a day was refilled  All medical records reviewed and reconciled patient PDMP database reviewed to ensure compliant with the treatment plan for pain management benefits due to the fact that patient has not responded to other measures for pain control so far and severe the pain is significant and interfering with normal daily activities I believe it is reasonable and appropriate to continue the controlled substance in order to improve his ability to function in enhance quality of life the patient has signed a narcotic agreement patient should it was utilize 1 pharmacy and not receiving narcotic from any other provider patient verbalizes understanding that breaching the contract will affect future prescription decision making and critical results in the dismissal from the practice if specifically the has been explained in understood to patient patient demonstrates following informed use of appropriate medicine to analgesia increase activity explained the side effects recommended that thisto the patient patient understands that also advised that appeared to expose him order an abuse and addiction and overdose counseling provided for prevention of any overuse abuse or addiction  Orders:  -     celecoxib (CeleBREX) 200 mg  capsule; Take 1 capsule (200 mg total) by mouth 2 (two) times a day  -     oxyCODONE-acetaminophen (PERCOCET)  mg per tablet; Take 1 tablet by mouth every 8 (eight) hours as needed for severe pain Max Daily Amount: 3 tablets  3. Primary osteoarthritis of both knees  Assessment & Plan:  Intractable pain both knee causing difficulty with walking underwent seen by orthopedist undergoing injection therapy for now cortisone injection although advised that eventually will do a knee replacement bilateral    Continue Celebrex 200 mg daily no side effects    For pain control pain management same is under stenosis lumbar spine and lumbar disc herniation    Celebrex was refilled    Awaiting to be seen by orthopedic    Treated in the past with cortisone injection no improvement  Orders:  -     celecoxib (CeleBREX) 200 mg capsule; Take 1 capsule (200 mg total) by mouth 2 (two) times a day  -     oxyCODONE-acetaminophen (PERCOCET)  mg per tablet; Take 1 tablet by mouth every 8 (eight) hours as needed for severe pain Max Daily Amount: 3 tablets  4. Benign essential hypertension  Assessment & Plan:  Blood pressure reviewed elevated since patient has not taken any antihypertensive today    Agree and continue management as follows    Asymptomatic    Low-salt diet    Soni 5/40 daily  Patient monitoring blood pressure at home controlled asymptomatic    BMP reviewed as follows    Lab Results   Component Value Date    SODIUM 140 05/18/2024    K 4.4 05/18/2024     05/18/2024    CO2 28 05/18/2024    BUN 20 05/18/2024    CREATININE 1.07 05/18/2024    GLUC 103 (H) 05/18/2024    CALCIUM 9.1 05/18/2024        Blood pressure controlled above reviewed continue current regimen as above labs unremarkable  Orders:  -     amlodipine-olmesartan (Soni) 5-40 MG; Take 1 tablet by mouth daily  -     triamterene-hydrochlorothiazide (MAXZIDE-25) 37.5-25 mg per tablet; Take 1 tablet by mouth daily  5. Neuropathy  Assessment &  Plan:  Chornic neuropathy lower extremities  Will continue lryica  No side effect or drug interaction  Fair control    Lyrica was refilled symptoms controlled  Orders:  -     pregabalin (LYRICA) 100 mg capsule; Take 1 capsule (100 mg total) by mouth 2 (two) times a day  6. Restless leg  Assessment & Plan:  Symptoms of restless legs very well-controlled with Mirapex    Continue manage medication as follows    Mirapex 0.5 mg at bedtime  Orders:  -     pramipexole (MIRAPEX) 0.5 mg tablet; Take 1 tablet (0.5 mg total) by mouth daily at bedtime  7. Episode of recurrent major depressive disorder, unspecified depression episode severity (HCC)  -     escitalopram (LEXAPRO) 20 mg tablet; Take 1 tablet (20 mg total) by mouth daily  8. Chronic intractable pain  -     escitalopram (LEXAPRO) 20 mg tablet; Take 1 tablet (20 mg total) by mouth daily  9. Lumbar foraminal stenosis  Assessment & Plan:  Same as under lumbar disc herniation Percocet was refilled  Orders:  -     oxyCODONE-acetaminophen (PERCOCET)  mg per tablet; Take 1 tablet by mouth every 8 (eight) hours as needed for severe pain Max Daily Amount: 3 tablets  10. Lumbar spondylosis  -     oxyCODONE-acetaminophen (PERCOCET)  mg per tablet; Take 1 tablet by mouth every 8 (eight) hours as needed for severe pain Max Daily Amount: 3 tablets        Discussion Summary and Plan:  Today's care plan and medications were reviewed with patient in detail and all their questions answered to their satisfaction.    Chief Complaint   Patient presents with   • Follow-up      Subjective:  Came in follow-up chronic medical condition listed visit diagnosis mainly refill for Percocet 10-3 25 4 times a day was refilled and management of chronic interval pain this regimen helping patient function and carry out day-to-day activities without for Tylenol the previous labs reviewed although the meds refilled      The following portions of the patient's history were reviewed and  updated as appropriate: allergies, current medications, past family history, past medical history, past social history, past surgical history and problem list.    Review of Systems   Constitutional:  Positive for activity change. Negative for appetite change, chills, diaphoresis, fatigue, fever and unexpected weight change.   HENT:  Negative for congestion, dental problem, drooling, ear discharge, ear pain, facial swelling, hearing loss, mouth sores, nosebleeds, postnasal drip, rhinorrhea, sinus pressure, sneezing, sore throat, tinnitus, trouble swallowing and voice change.    Eyes:  Negative for photophobia, pain, discharge, redness, itching and visual disturbance.   Respiratory:  Negative for apnea, cough, choking, chest tightness, shortness of breath, wheezing and stridor.    Cardiovascular:  Negative for chest pain, palpitations and leg swelling.   Gastrointestinal:  Negative for abdominal distention, abdominal pain, anal bleeding, blood in stool, constipation, diarrhea, nausea, rectal pain and vomiting.   Endocrine: Negative for cold intolerance, heat intolerance, polydipsia, polyphagia and polyuria.   Genitourinary:  Positive for dysuria and frequency. Negative for decreased urine volume, difficulty urinating, enuresis, flank pain, genital sores, hematuria and urgency.   Musculoskeletal:  Positive for arthralgias, back pain, gait problem, joint swelling, myalgias and neck pain. Negative for neck stiffness.   Skin:  Negative for color change, pallor, rash and wound.   Allergic/Immunologic: Negative.  Negative for environmental allergies, food allergies and immunocompromised state.   Neurological:  Negative for dizziness, tremors, seizures, syncope, facial asymmetry, speech difficulty, weakness, light-headedness, numbness and headaches.   Psychiatric/Behavioral:  Negative for agitation, behavioral problems, confusion, decreased concentration, dysphoric mood, hallucinations, self-injury, sleep disturbance and  suicidal ideas. The patient is not nervous/anxious and is not hyperactive.          Historical Information   Patient Active Problem List   Diagnosis   • Benign essential hypertension   • Benign prostatic hyperplasia   • DDD (degenerative disc disease), lumbar   • Incomplete emptying of bladder   • Lumbar disc herniation   • Lumbar foraminal stenosis   • Neuropathy   • Chronic intractable pain   • Mild episode of recurrent major depressive disorder (HCC)   • Hematospermia   • Prostatitis   • Other male erectile dysfunction   • Annual physical exam   • Primary osteoarthritis of both knees   • Chronic cough   • Non-seasonal allergic rhinitis due to pollen   • Continuous opioid dependence (HCC)   • Pain   • Swelling   • Intermittent claudication due to atherosclerosis of artery of extremity (HCC)   • Lumbar spondylosis   • Gastric bypass status for obesity   • Morbid (severe) obesity due to excess calories (HCC)   • Uncomplicated opioid dependence (HCC)   • Allergic reaction   • Prediabetes   • Mixed hyperlipidemia   • Restless leg     Past Medical History:   Diagnosis Date   • Abdominal pain    • Anxiety disorder    • Arthritis    • Back pain    • Dizziness    • Headache    • Hypertension    • Lightheadedness    • Obesity    • Palpitations    • SOB (shortness of breath)      Past Surgical History:   Procedure Laterality Date   • CHOLECYSTECTOMY     • GASTRIC BYPASS       Social History     Substance and Sexual Activity   Alcohol Use None    Comment: none per Trish     Social History     Substance and Sexual Activity   Drug Use Not on file    Comment: none per Trish     Social History     Tobacco Use   Smoking Status Never   Smokeless Tobacco Never     History reviewed. No pertinent family history.  Health Maintenance Due   Topic   • Hepatitis C Screening    • HIV Screening    • Zoster Vaccine (1 of 2)   • COVID-19 Vaccine (7 - 2023-24 season)   • Annual Physical    • Influenza Vaccine (1)      Meds/Allergies  "      Current Outpatient Medications:   •  amlodipine-olmesartan (Soni) 5-40 MG, Take 1 tablet by mouth daily, Disp: 90 tablet, Rfl: 2  •  celecoxib (CeleBREX) 200 mg capsule, Take 1 capsule (200 mg total) by mouth 2 (two) times a day, Disp: 180 capsule, Rfl: 3  •  cetirizine (ZyrTEC) 10 mg tablet, Take 1 tablet (10 mg total) by mouth daily, Disp: 30 tablet, Rfl: 2  •  escitalopram (LEXAPRO) 20 mg tablet, Take 1 tablet (20 mg total) by mouth daily, Disp: 90 tablet, Rfl: 3  •  fluticasone (FLONASE) 50 mcg/act nasal spray, 1 spray into each nostril daily, Disp: 18.2 mL, Rfl: 2  •  oxyCODONE-acetaminophen (PERCOCET)  mg per tablet, Take 1 tablet by mouth every 8 (eight) hours as needed for severe pain Max Daily Amount: 3 tablets, Disp: 90 tablet, Rfl: 0  •  pramipexole (MIRAPEX) 0.5 mg tablet, Take 1 tablet (0.5 mg total) by mouth daily at bedtime, Disp: 90 tablet, Rfl: 3  •  pregabalin (LYRICA) 100 mg capsule, Take 1 capsule (100 mg total) by mouth 2 (two) times a day, Disp: 180 capsule, Rfl: 2  •  sildenafil (Viagra) 100 mg tablet, Take 1 tablet (100 mg total) by mouth daily as needed for erectile dysfunction, Disp: 10 tablet, Rfl: 55  •  tamsulosin (FLOMAX) 0.4 mg, Take 1 capsule (0.4 mg total) by mouth daily with dinner, Disp: 90 capsule, Rfl: 2  •  triamterene-hydrochlorothiazide (MAXZIDE-25) 37.5-25 mg per tablet, Take 1 tablet by mouth daily, Disp: 90 tablet, Rfl: 0      Objective:    Vitals:   /80   Pulse 76   Ht 5' 11\" (1.803 m)   Wt 106 kg (234 lb)   SpO2 97%   BMI 32.64 kg/m²   Body mass index is 32.64 kg/m².  Vitals:    07/17/24 1500   Weight: 106 kg (234 lb)       Physical Exam  Vitals and nursing note reviewed.   Constitutional:       General: He is not in acute distress.     Appearance: He is well-developed. He is not ill-appearing, toxic-appearing or diaphoretic.   HENT:      Head: Normocephalic and atraumatic.      Right Ear: External ear normal.      Left Ear: External ear normal.     "  Nose: Nose normal.      Mouth/Throat:      Pharynx: No oropharyngeal exudate.   Eyes:      General: Lids are normal. Lids are everted, no foreign bodies appreciated. No scleral icterus.        Right eye: No discharge.         Left eye: No discharge.      Conjunctiva/sclera: Conjunctivae normal.      Pupils: Pupils are equal, round, and reactive to light.   Neck:      Thyroid: No thyromegaly.      Vascular: Normal carotid pulses. No carotid bruit, hepatojugular reflux or JVD.      Trachea: No tracheal tenderness or tracheal deviation.   Cardiovascular:      Rate and Rhythm: Normal rate and regular rhythm.      Pulses: Normal pulses.      Heart sounds: Normal heart sounds. No murmur heard.     No friction rub. No gallop.   Pulmonary:      Effort: Pulmonary effort is normal. No respiratory distress.      Breath sounds: Normal breath sounds. No stridor. No wheezing or rales.   Chest:      Chest wall: No tenderness.   Abdominal:      General: Bowel sounds are normal. There is no distension.      Palpations: Abdomen is soft. There is no mass.      Tenderness: There is no abdominal tenderness. There is no guarding or rebound.   Musculoskeletal:         General: No tenderness or deformity. Normal range of motion.      Cervical back: Normal range of motion and neck supple. No edema, erythema or rigidity. No spinous process tenderness or muscular tenderness. Normal range of motion.   Lymphadenopathy:      Head:      Right side of head: No submental, submandibular, tonsillar, preauricular or posterior auricular adenopathy.      Left side of head: No submental, submandibular, tonsillar, preauricular, posterior auricular or occipital adenopathy.      Cervical: No cervical adenopathy.      Right cervical: No superficial, deep or posterior cervical adenopathy.     Left cervical: No superficial, deep or posterior cervical adenopathy.      Upper Body:      Right upper body: No pectoral adenopathy.      Left upper body: No pectoral  adenopathy.   Skin:     General: Skin is warm and dry.      Coloration: Skin is not pale.      Findings: No erythema or rash.   Neurological:      General: No focal deficit present.      Mental Status: He is alert and oriented to person, place, and time.      Cranial Nerves: No cranial nerve deficit.      Sensory: No sensory deficit.      Motor: No tremor, abnormal muscle tone or seizure activity.      Coordination: Coordination normal.      Gait: Gait abnormal.      Deep Tendon Reflexes: Reflexes are normal and symmetric. Reflexes normal.   Psychiatric:         Behavior: Behavior normal.         Thought Content: Thought content normal.         Judgment: Judgment normal.       Lab Review   Orders Only on 05/18/2024   Component Date Value Ref Range Status   • Creatinine(Crt),U 05/18/2024 76.8  50.0 - 200.0 mg/dL Final   • Albumin,U,Random 05/18/2024 <0.7  <3.0 mg/dL Final   • Microalb/Creat Ratio 05/18/2024 Unable to perform calculation.  <30.0 mg/gm CREA Final   Orders Only on 05/18/2024   Component Date Value Ref Range Status   • Hemoglobin 05/18/2024 11.8 (L)  12.5 - 17.0 g/dL Final   • HCT 05/18/2024 35.3 (L)  37.0 - 48.0 % Final   • White Blood Cell Count 05/18/2024 5.7  4.0 - 10.5 thou/cmm Final   • Red Blood Cell Count 05/18/2024 4.79  4.00 - 5.40 mill/cmm Final   • Platelet Count 05/18/2024 272  140 - 350 thou/cmm Final   • SL AMB MPV 05/18/2024 10.2  7.5 - 11.3 fL Final   • MCV 05/18/2024 74 (L)  80 - 100 fL Final   • MCH 05/18/2024 24.6 (L)  27.0 - 36.0 pg Final   • MCHC 05/18/2024 33.5  32.0 - 37.0 g/dL Final   • RDW 05/18/2024 15.4  12.0 - 16.0 % Final   • Differential Type 05/18/2024 AUTO   Final   • Neutrophils (Absolute) 05/18/2024 2.7  1.8 - 7.8 thou/cmm Final   • Lymphocytes (Absolute) 05/18/2024 1.8  1.0 - 3.0 thou/cmm Final   • Monocytes (Absolute) 05/18/2024 0.5  0.3 - 1.0 thou/cmm Final   • Eosinophils (Absolute) 05/18/2024 0.7 (H)  0.0 - 0.5 thou/cmm Final   • Basophils ABS 05/18/2024 0.1  0.0 -  0.1 thou/cmm Final   • Neutrophils 05/18/2024 47  % Final   • Lymphocytes 05/18/2024 31  % Final   • Monocytes 05/18/2024 9  % Final   • Eosinophils 05/18/2024 12  % Final   • Basophils PCT 05/18/2024 1  % Final   • Specific Gravity Ur 05/18/2024 1.011  1.003 - 1.030 Final   • pH, Urine 05/18/2024 6.0  4.5 - 8.0 Final   • Protein, Urine 05/18/2024 Negative  NEG mg/dL Final   • Glucose, Urine 05/18/2024 Negative  NEG mg/dL Final   • Ketone, Urine 05/18/2024 Negative  NEG mg/dL Final   • Blood, Urine 05/18/2024 Negative  NEG mg/dL Final   • Leukocyte Esterase 05/18/2024 Negative  NEG /uL Final   • Nitrites Urine 05/18/2024 Negative  NEG Final   • POCT URINE COMMENT 05/18/2024 Not applicable   Final   • RBC, Urine 05/18/2024 0-2  0 - 2 /hpf Final   • SL AMB WBC, URINE 05/18/2024 0  0 - 5 /hpf Final   • Glucose, Random 05/18/2024 103 (H)  65 - 99 mg/dL Final   • BUN 05/18/2024 20  7 - 28 mg/dL Final   • Creatinine 05/18/2024 1.07  0.53 - 1.30 mg/dL Final   • Sodium 05/18/2024 140  135 - 145 mmol/L Final   • Potassium 05/18/2024 4.4  3.5 - 5.2 mmol/L Final   • Chloride 05/18/2024 103  100 - 109 mmol/L Final   • CO2 05/18/2024 28  21 - 31 mmol/L Final   • Calcium 05/18/2024 9.1  8.5 - 10.1 mg/dL Final   • Alkaline Phosphatase 05/18/2024 96  35 - 120 U/L Final   • Albumin 05/18/2024 4.2  3.5 - 5.7 g/dL Final   • TOTAL BILIRUBIN 05/18/2024 0.6  0.2 - 1.0 mg/dL Final    Comment: Eltrombopag and its metabolites may interfere with this assay causing   erroneously high patient results.     • Protein, Total 05/18/2024 7.0  6.3 - 8.3 g/dL Final   • AST 05/18/2024 17  <41 U/L Final   • ALT 05/18/2024 16  <56 U/L Final   • ANION GAP 05/18/2024 9  3 - 11 Final   • eGFR 05/18/2024 80  >59 Final   • Comment 05/18/2024 (Note)   Final    Comment:  Interpretive information: calculated GFR                                              Units: mL/min per 1.73 square meters   Reported eGFR is based on the CKD-EPI 2021 creatinine equation  that   does not use a race coefficient and conforms to the NKF-ASN Task   Force Recommendations.   Note: Calculated GFR may not be an accurate indicator of renal   function if the patient's renal function is not in a steady state.                                              GFR Categories in Chronic Kidney Disease (CKD):   GFR        GFR (mL/min/1.73 square meters)   Category:                   Interpretation:   G1         90 or greater    Normal or high*   G2         60 - 89          Mild decrease*   G3a        45 - 59          Mild to moderate decrease   G3b        30 - 44          Moderate to severe decrease   G4         15 - 29          Severe decrease   G5         14 or less       Kidney failure                                              *In the absence of evidence of kidney damage, neither G                           FR category G1   or G2 fulfill the criteria for CKD. Kidney Int Suppl 2013, 3: 1-1 50.     • Cholesterol, Total 05/18/2024 115  <200 mg/dL Final   • Triglycerides 05/18/2024 66  <150 mg/dL Final   • HDL Cholesterol 05/18/2024 44  23 - 92 mg/dL Final   • Non HDL Chol. (LDL+VLDL) 05/18/2024 71  <160 mg/dL Final   • Chol HDLC Ratio 05/18/2024 2.6   Final   • LDL Calculated 05/18/2024 58  <130 mg/dL Final    Comment: LDL Cholesterol was calculated using the Friedewald equation. Direct   measurement of LDL is not indicated for this patient based on [facility]'s   analytical algorithm for measurement of LDL Cholesterol.     • Hemoglobin A1C 05/18/2024 5.6  <5.7 % Final    Comment: Reference Range   Non-diabetic                     <5.7   Pre-diabetic                     5.7-6.4   Diabetic                         >=6.5   ADA target for diabetic control  <=7     • Estimated Average Glucose 05/18/2024 114  mg/dL Final   • 25-HYDROXY VIT D 05/18/2024 33  30 - 100 ng/mL Final    Comment: Interpretive information: Total Vitamin D, 25-Hydroxy                                              This assay quantifies the  sum of vitamin D3,   25-hydroxy and vitamin D2, 25-hydroxy.                                                <10    ng/mL  Deficiency     10-30  ng/mL  Insufficiency      ng/mL  Sufficiency     >100   ng/mL  Toxicity           Patient Instructions   Patient Education     Chronic pain   The Basics   Written by the doctors and editors at [facility]   What is chronic pain? -- This is pain that lasts longer than 3 to 6 months. In many cases, this means that pain continues even after the injury or condition that first caused it has healed.  Pain can affect the body in different ways. For example, pain can be:   An ache deep inside the muscle or bone   Stabbing or shooting, often with tingling or numbness   Dull and throbbing  People who have chronic pain might have a hard time doing their usual activities, such as bathing or dressing. This can lead to depression and anxiety. It can also cause problems with sleep.  What causes chronic pain? -- It is not always clear. Sometimes, it is caused by an ongoing medical problem, such as arthritis or nerve damage from diabetes. But doctors cannot always find the cause of chronic pain.  In some cases, people with chronic pain must accept that their pain will never be explained. This means that they have to work with their health care team to address the pain, even if they don't know its cause.  Will I need tests? -- Your doctor might do tests to figure out the cause of your pain. For example, you might get:   Blood tests - These can check for infection, signs of inflammation, or diseases that can cause pain.   X-rays or other imaging tests - Imaging tests create pictures of the inside of the body. They can check for bone fractures, joint damage, cancer, or other changes in your body that could cause pain.   Nerve tests - These are ways to check whether the nerves are working normally.  However, tests cannot always show the cause of pain. Scientists think that in some people, the  "pain signals in the brain stop working normally. The signals get \"stuck\" in the on position, even when the source of pain is gone.  How is chronic pain treated? -- There are many different ways to manage chronic pain. Most people need to try different combinations. The right approach for you depends on your pain as well as your lifestyle and preferences.  Treatment options include:   Non-medicine techniques - Doctors recommend first trying to manage pain without medicines. This can involve lots of different things, such as:   Working with a counselor or therapist   Physical therapy or an exercise program   Lifestyle and behavior changes to improve your mood, sleep, diet, and stress level   Acupuncture   Massage   Ice or heat   Devices that affect nerve signals   Medicines - When the above methods are not enough to relieve pain, doctors can recommend medicines. Different medicines work in different ways. Some pain medicines, like opioids, are not recommended for treating chronic pain in most cases. That's because they come with serious risks. Doctors usually try other medicines first.  If your doctor suggests a medicine that seems strange, keep an open mind. Sometimes, doctors treat pain with medicines made to treat other medical problems. For example, medicines for depression can help with pain, because they work on areas of the brain that process pain. Doctors can also use medicines for seizures to treat pain, because they help with overactive nerves.   Other treatments - These might include:   Injections (shots) of numbing or pain-relieving medicines   Surgery  To find the best treatment for you:   Be open to trying new treatments and combinations of treatments. Sometimes, you have to try a few different options before you find one that works best.   Set realistic goals for your treatment. Even if you can't completely get rid of your pain, you might be able to control it enough so you can do the activities you " like.  Is there anything I can do on my own to feel better? -- Yes. It can help to:   Try things like heat, cold, or massage - Depending on the cause of your pain, these things might help. Check with your doctor to make sure that it is OK for your condition.   Practice relaxing - You can learn methods to relax your body, such as doing deep breathing exercises. Ask your doctor or nurse about these methods. Relaxing the mind can help with how the body feels pain. People can learn to quiet their pain or make it less bothersome. It can also help to make changes to improve your sleep habits.   Stay as active as possible - Walking, swimming, anju chi (a kind of martial art), or biking can all help ease muscle and joint pain. Even gentle forms of exercise are good for your health. If you are not active, your pain might get worse.  If you haven't been active for a while, start slowly. Make small increases in the intensity and amount of time you spend exercising. If exercising makes your pain worse, talk with your doctor. They might recommend a program that can help you get more active.  What medical care will I need? -- Many people need a team to help manage their care. A treatment team usually includes:   Doctors or specialists   A physical therapist   Someone trained in mental health, like a  or counselor  Your team will probably want to see you regularly:   They will ask you questions about your pain and other symptoms. They will also work with you to learn how treatment is working and make changes if needed.   They will ask you about your mood and your mental health. Depression and chronic pain can make each other worse. If you have depression, they might suggest treatment for it.  As you get better at managing your pain, you might see your team less frequently.  All topics are updated as new evidence becomes available and our peer review process is complete.  This topic retrieved from [employer] on: Mar 29,  "2024.  Topic 58768 Version 18.0  Release: 32.2.4 - C32.87  © 2024 UpToDate, Inc. and/or its affiliates. All rights reserved.  Consumer Information Use and Disclaimer   Disclaimer: This generalized information is a limited summary of diagnosis, treatment, and/or medication information. It is not meant to be comprehensive and should be used as a tool to help the user understand and/or assess potential diagnostic and treatment options. It does NOT include all information about conditions, treatments, medications, side effects, or risks that may apply to a specific patient. It is not intended to be medical advice or a substitute for the medical advice, diagnosis, or treatment of a health care provider based on the health care provider's examination and assessment of a patient's specific and unique circumstances. Patients must speak with a health care provider for complete information about their health, medical questions, and treatment options, including any risks or benefits regarding use of medications. This information does not endorse any treatments or medications as safe, effective, or approved for treating a specific patient. UpToDate, Inc. and its affiliates disclaim any warranty or liability relating to this information or the use thereof.The use of this information is governed by the Terms of Use, available at https://www.wolterskluwer.com/en/know/clinical-effectiveness-terms. 2024© UpToDate, Inc. and its affiliates and/or licensors. All rights reserved.  Copyright   © 2024 UpToDate, Inc. and/or its affiliates. All rights reserved.       Wilmar Bradford MD        \"This note has been constructed using a voice recognition system.Therefore there may be syntax, spelling, and/or grammatical errors. Please call if you have any questions. \"  "

## 2024-07-17 NOTE — ASSESSMENT & PLAN NOTE
Percocet 10-3 25 4 times a day was refilled remaining follow-up plan finding as follows regimen helping patient control pain function and work full-time        Percocet 10/325 4 times a day was refilled  All medical records reviewed and reconciled patient PDMP database reviewed to ensure compliant with the treatment plan for pain management benefits due to the fact that patient has not responded to other measures for pain control so far and severe the pain is significant and interfering with normal daily activities I believe it is reasonable and appropriate to continue the controlled substance in order to improve his ability to function in enhance quality of life the patient has signed a narcotic agreement patient should it was utilize 1 pharmacy and not receiving narcotic from any other provider patient verbalizes understanding that breaching the contract will affect future prescription decision making and critical results in the dismissal from the practice if specifically the has been explained in understood to patient patient demonstrates following informed use of appropriate medicine to analgesia increase activity explained the side effects recommended that thisto the patient patient understands that also advised that appeared to expose him order an abuse and addiction and overdose counseling provided for prevention of any overuse abuse or addiction

## 2024-07-17 NOTE — ASSESSMENT & PLAN NOTE
Chornic neuropathy lower extremities  Will continue lryica  No side effect or drug interaction  Fair control    Lyrica was refilled symptoms controlled

## 2024-07-17 NOTE — PATIENT INSTRUCTIONS
"Patient Education     Chronic pain   The Basics   Written by the doctors and editors at [facility]   What is chronic pain? -- This is pain that lasts longer than 3 to 6 months. In many cases, this means that pain continues even after the injury or condition that first caused it has healed.  Pain can affect the body in different ways. For example, pain can be:   An ache deep inside the muscle or bone   Stabbing or shooting, often with tingling or numbness   Dull and throbbing  People who have chronic pain might have a hard time doing their usual activities, such as bathing or dressing. This can lead to depression and anxiety. It can also cause problems with sleep.  What causes chronic pain? -- It is not always clear. Sometimes, it is caused by an ongoing medical problem, such as arthritis or nerve damage from diabetes. But doctors cannot always find the cause of chronic pain.  In some cases, people with chronic pain must accept that their pain will never be explained. This means that they have to work with their health care team to address the pain, even if they don't know its cause.  Will I need tests? -- Your doctor might do tests to figure out the cause of your pain. For example, you might get:   Blood tests - These can check for infection, signs of inflammation, or diseases that can cause pain.   X-rays or other imaging tests - Imaging tests create pictures of the inside of the body. They can check for bone fractures, joint damage, cancer, or other changes in your body that could cause pain.   Nerve tests - These are ways to check whether the nerves are working normally.  However, tests cannot always show the cause of pain. Scientists think that in some people, the pain signals in the brain stop working normally. The signals get \"stuck\" in the on position, even when the source of pain is gone.  How is chronic pain treated? -- There are many different ways to manage chronic pain. Most people need to try different " combinations. The right approach for you depends on your pain as well as your lifestyle and preferences.  Treatment options include:   Non-medicine techniques - Doctors recommend first trying to manage pain without medicines. This can involve lots of different things, such as:   Working with a counselor or therapist   Physical therapy or an exercise program   Lifestyle and behavior changes to improve your mood, sleep, diet, and stress level   Acupuncture   Massage   Ice or heat   Devices that affect nerve signals   Medicines - When the above methods are not enough to relieve pain, doctors can recommend medicines. Different medicines work in different ways. Some pain medicines, like opioids, are not recommended for treating chronic pain in most cases. That's because they come with serious risks. Doctors usually try other medicines first.  If your doctor suggests a medicine that seems strange, keep an open mind. Sometimes, doctors treat pain with medicines made to treat other medical problems. For example, medicines for depression can help with pain, because they work on areas of the brain that process pain. Doctors can also use medicines for seizures to treat pain, because they help with overactive nerves.   Other treatments - These might include:   Injections (shots) of numbing or pain-relieving medicines   Surgery  To find the best treatment for you:   Be open to trying new treatments and combinations of treatments. Sometimes, you have to try a few different options before you find one that works best.   Set realistic goals for your treatment. Even if you can't completely get rid of your pain, you might be able to control it enough so you can do the activities you like.  Is there anything I can do on my own to feel better? -- Yes. It can help to:   Try things like heat, cold, or massage - Depending on the cause of your pain, these things might help. Check with your doctor to make sure that it is OK for your  condition.   Practice relaxing - You can learn methods to relax your body, such as doing deep breathing exercises. Ask your doctor or nurse about these methods. Relaxing the mind can help with how the body feels pain. People can learn to quiet their pain or make it less bothersome. It can also help to make changes to improve your sleep habits.   Stay as active as possible - Walking, swimming, anju chi (a kind of martial art), or biking can all help ease muscle and joint pain. Even gentle forms of exercise are good for your health. If you are not active, your pain might get worse.  If you haven't been active for a while, start slowly. Make small increases in the intensity and amount of time you spend exercising. If exercising makes your pain worse, talk with your doctor. They might recommend a program that can help you get more active.  What medical care will I need? -- Many people need a team to help manage their care. A treatment team usually includes:   Doctors or specialists   A physical therapist   Someone trained in mental health, like a  or counselor  Your team will probably want to see you regularly:   They will ask you questions about your pain and other symptoms. They will also work with you to learn how treatment is working and make changes if needed.   They will ask you about your mood and your mental health. Depression and chronic pain can make each other worse. If you have depression, they might suggest treatment for it.  As you get better at managing your pain, you might see your team less frequently.  All topics are updated as new evidence becomes available and our peer review process is complete.  This topic retrieved from [employer] on: Mar 29, 2024.  Topic 42680 Version 18.0  Release: 32.2.4 - C32.87  © 2024 UpToDate, Inc. and/or its affiliates. All rights reserved.  Consumer Information Use and Disclaimer   Disclaimer: This generalized information is a limited summary of diagnosis,  treatment, and/or medication information. It is not meant to be comprehensive and should be used as a tool to help the user understand and/or assess potential diagnostic and treatment options. It does NOT include all information about conditions, treatments, medications, side effects, or risks that may apply to a specific patient. It is not intended to be medical advice or a substitute for the medical advice, diagnosis, or treatment of a health care provider based on the health care provider's examination and assessment of a patient's specific and unique circumstances. Patients must speak with a health care provider for complete information about their health, medical questions, and treatment options, including any risks or benefits regarding use of medications. This information does not endorse any treatments or medications as safe, effective, or approved for treating a specific patient. UpToDate, Inc. and its affiliates disclaim any warranty or liability relating to this information or the use thereof.The use of this information is governed by the Terms of Use, available at https://www.woltersSUNDAYTOZuwer.com/en/know/clinical-effectiveness-terms. 2024© UpToDate, Inc. and its affiliates and/or licensors. All rights reserved.  Copyright   © 2024 UpToDate, Inc. and/or its affiliates. All rights reserved.

## 2024-07-17 NOTE — ASSESSMENT & PLAN NOTE
Blood pressure reviewed elevated since patient has not taken any antihypertensive today    Agree and continue management as follows    Asymptomatic    Low-salt diet    Soni 5/40 daily  Patient monitoring blood pressure at home controlled asymptomatic    BMP reviewed as follows    Lab Results   Component Value Date    SODIUM 140 05/18/2024    K 4.4 05/18/2024     05/18/2024    CO2 28 05/18/2024    BUN 20 05/18/2024    CREATININE 1.07 05/18/2024    GLUC 103 (H) 05/18/2024    CALCIUM 9.1 05/18/2024        Blood pressure controlled above reviewed continue current regimen as above labs unremarkable

## 2024-07-17 NOTE — ASSESSMENT & PLAN NOTE
Intractable pain both knee causing difficulty with walking underwent seen by orthopedist undergoing injection therapy for now cortisone injection although advised that eventually will do a knee replacement bilateral    Continue Celebrex 200 mg daily no side effects    For pain control pain management same is under stenosis lumbar spine and lumbar disc herniation    Celebrex was refilled    Awaiting to be seen by orthopedic    Treated in the past with cortisone injection no improvement

## 2024-08-21 ENCOUNTER — OFFICE VISIT (OUTPATIENT)
Dept: INTERNAL MEDICINE CLINIC | Facility: CLINIC | Age: 59
End: 2024-08-21
Payer: COMMERCIAL

## 2024-08-21 VITALS
DIASTOLIC BLOOD PRESSURE: 78 MMHG | WEIGHT: 234 LBS | BODY MASS INDEX: 32.76 KG/M2 | HEART RATE: 78 BPM | HEIGHT: 71 IN | SYSTOLIC BLOOD PRESSURE: 124 MMHG | OXYGEN SATURATION: 98 %

## 2024-08-21 DIAGNOSIS — M51.26 LUMBAR DISC HERNIATION: Primary | ICD-10-CM

## 2024-08-21 DIAGNOSIS — M48.061 LUMBAR FORAMINAL STENOSIS: ICD-10-CM

## 2024-08-21 DIAGNOSIS — M17.0 PRIMARY OSTEOARTHRITIS OF BOTH KNEES: ICD-10-CM

## 2024-08-21 DIAGNOSIS — M47.816 LUMBAR SPONDYLOSIS: ICD-10-CM

## 2024-08-21 PROCEDURE — 99213 OFFICE O/P EST LOW 20 MIN: CPT | Performed by: INTERNAL MEDICINE

## 2024-08-21 RX ORDER — OXYCODONE AND ACETAMINOPHEN 10; 325 MG/1; MG/1
1 TABLET ORAL EVERY 8 HOURS PRN
Qty: 90 TABLET | Refills: 0 | Status: SHIPPED | OUTPATIENT
Start: 2024-08-21

## 2024-08-21 NOTE — ASSESSMENT & PLAN NOTE
Intractable pain both knee causing difficulty with walking underwent seen by orthopedist undergoing injection therapy for now cortisone injection although advised that eventually will do a knee replacement bilateral    Continue Celebrex 200 mg daily no side effects    For pain control pain management same is under stenosis lumbar spine and lumbar disc herniation    Celebrex was refilled    And getting the cortisone injection both knees some improvement    Follow-up with the orthopedic

## 2024-08-21 NOTE — PROGRESS NOTES
Dr. Bradford's Office Visit Note  24     Ifenayi Puga 58 y.o. male MRN: [id number]  : 1965    Assessment:     1. Lumbar disc herniation  Assessment & Plan:  Percocet 10-3 25 4 times a day was refilled the current regimen helping patient work full-time care of day-to-day activities uncontrolled pain remaining follow-up on finding as follows from a previous progress note the pain contract in place regular drug screen`       Percocet 10/325 4 times a day was refilled  All medical records reviewed and reconciled patient PDMP database reviewed to ensure compliant with the treatment plan for pain management benefits due to the fact that patient has not responded to other measures for pain control so far and severe the pain is significant and interfering with normal daily activities I believe it is reasonable and appropriate to continue the controlled substance in order to improve his ability to function in enhance quality of life the patient has signed a narcotic agreement patient should it was utilize 1 pharmacy and not receiving narcotic from any other provider patient verbalizes understanding that breaching the contract will affect future prescription decision making and critical results in the dismissal from the practice if specifically the has been explained in understood to patient patient demonstrates following informed use of appropriate medicine to analgesia increase activity explained the side effects recommended that thisto the patient patient understands that also advised that appeared to expose him order an abuse and addiction and overdose counseling provided for prevention of any overuse abuse or addiction  Orders:  -     oxyCODONE-acetaminophen (PERCOCET)  mg per tablet; Take 1 tablet by mouth every 8 (eight) hours as needed for severe pain Max Daily Amount: 3 tablets  2. Primary osteoarthritis of both knees  Assessment & Plan:  Intractable pain both knee causing difficulty with walking  underwent seen by orthopedist undergoing injection therapy for now cortisone injection although advised that eventually will do a knee replacement bilateral    Continue Celebrex 200 mg daily no side effects    For pain control pain management same is under stenosis lumbar spine and lumbar disc herniation    Celebrex was refilled    And getting the cortisone injection both knees some improvement    Follow-up with the orthopedic      Orders:  -     oxyCODONE-acetaminophen (PERCOCET)  mg per tablet; Take 1 tablet by mouth every 8 (eight) hours as needed for severe pain Max Daily Amount: 3 tablets  3. Lumbar foraminal stenosis  Assessment & Plan:  Same as under lumbar disc herniation Percocet was refilled  Orders:  -     oxyCODONE-acetaminophen (PERCOCET)  mg per tablet; Take 1 tablet by mouth every 8 (eight) hours as needed for severe pain Max Daily Amount: 3 tablets  4. Lumbar spondylosis  -     oxyCODONE-acetaminophen (PERCOCET)  mg per tablet; Take 1 tablet by mouth every 8 (eight) hours as needed for severe pain Max Daily Amount: 3 tablets        Discussion Summary and Plan:  Today's care plan and medications were reviewed with patient in detail and all their questions answered to their satisfaction.    Chief Complaint   Patient presents with   • Follow-up      Subjective:  Came in follow-up chronic medical condition listed visit diagnosis mainly refill for Percocet 10-3 25 4 times a day helping patient work full-time function carry out day-to-day activities was refilled      The following portions of the patient's history were reviewed and updated as appropriate: allergies, current medications, past family history, past medical history, past social history, past surgical history and problem list.    Review of Systems   Constitutional:  Positive for activity change. Negative for appetite change, chills, diaphoresis, fatigue, fever and unexpected weight change.   HENT:  Negative for congestion,  dental problem, drooling, ear discharge, ear pain, facial swelling, hearing loss, mouth sores, nosebleeds, postnasal drip, rhinorrhea, sinus pressure, sneezing, sore throat, tinnitus, trouble swallowing and voice change.    Eyes:  Negative for photophobia, pain, discharge, redness, itching and visual disturbance.   Respiratory:  Negative for apnea, cough, choking, chest tightness, shortness of breath, wheezing and stridor.    Cardiovascular:  Negative for chest pain, palpitations and leg swelling.   Gastrointestinal:  Negative for abdominal distention, abdominal pain, anal bleeding, blood in stool, constipation, diarrhea, nausea, rectal pain and vomiting.   Endocrine: Negative for cold intolerance, heat intolerance, polydipsia, polyphagia and polyuria.   Genitourinary:  Negative for decreased urine volume, difficulty urinating, dysuria, enuresis, flank pain, frequency, genital sores, hematuria and urgency.   Musculoskeletal:  Positive for arthralgias, back pain, gait problem, joint swelling and myalgias. Negative for neck stiffness.   Skin:  Negative for color change, pallor, rash and wound.   Allergic/Immunologic: Negative.  Negative for environmental allergies, food allergies and immunocompromised state.   Neurological:  Negative for dizziness, tremors, seizures, syncope, facial asymmetry, speech difficulty, weakness, light-headedness, numbness and headaches.   Psychiatric/Behavioral:  Negative for agitation, behavioral problems, confusion, decreased concentration, dysphoric mood, hallucinations, self-injury, sleep disturbance and suicidal ideas. The patient is not nervous/anxious and is not hyperactive.          Historical Information   Patient Active Problem List   Diagnosis   • Benign essential hypertension   • Benign prostatic hyperplasia   • DDD (degenerative disc disease), lumbar   • Incomplete emptying of bladder   • Lumbar disc herniation   • Lumbar foraminal stenosis   • Neuropathy   • Chronic intractable  pain   • Mild episode of recurrent major depressive disorder (HCC)   • Hematospermia   • Prostatitis   • Other male erectile dysfunction   • Annual physical exam   • Primary osteoarthritis of both knees   • Chronic cough   • Non-seasonal allergic rhinitis due to pollen   • Continuous opioid dependence (HCC)   • Pain   • Swelling   • Intermittent claudication due to atherosclerosis of artery of extremity (HCC)   • Lumbar spondylosis   • Gastric bypass status for obesity   • Morbid (severe) obesity due to excess calories (HCC)   • Uncomplicated opioid dependence (HCC)   • Allergic reaction   • Prediabetes   • Mixed hyperlipidemia   • Restless leg     Past Medical History:   Diagnosis Date   • Abdominal pain    • Anxiety disorder    • Arthritis    • Back pain    • Dizziness    • Headache    • Hypertension    • Lightheadedness    • Obesity    • Palpitations    • SOB (shortness of breath)      Past Surgical History:   Procedure Laterality Date   • CHOLECYSTECTOMY     • GASTRIC BYPASS       Social History     Substance and Sexual Activity   Alcohol Use None    Comment: none per [place]     Social History     Substance and Sexual Activity   Drug Use Not on file    Comment: none per [place]     Social History     Tobacco Use   Smoking Status Never   Smokeless Tobacco Never     History reviewed. No pertinent family history.  Health Maintenance Due   Topic   • Hepatitis C Screening    • HIV Screening    • Zoster Vaccine (1 of 2)   • COVID-19 Vaccine (7 - 2023-24 season)   • Annual Physical    • Influenza Vaccine (1)      Meds/Allergies       Current Outpatient Medications:   •  amlodipine-olmesartan (Soni) 5-40 MG, Take 1 tablet by mouth daily, Disp: 90 tablet, Rfl: 2  •  celecoxib (CeleBREX) 200 mg capsule, Take 1 capsule (200 mg total) by mouth 2 (two) times a day, Disp: 180 capsule, Rfl: 3  •  cetirizine (ZyrTEC) 10 mg tablet, Take 1 tablet (10 mg total) by mouth daily, Disp: 30 tablet, Rfl: 2  •  escitalopram (LEXAPRO) 20  "mg tablet, Take 1 tablet (20 mg total) by mouth daily, Disp: 90 tablet, Rfl: 3  •  fluticasone (FLONASE) 50 mcg/act nasal spray, 1 spray into each nostril daily, Disp: 18.2 mL, Rfl: 2  •  oxyCODONE-acetaminophen (PERCOCET)  mg per tablet, Take 1 tablet by mouth every 8 (eight) hours as needed for severe pain Max Daily Amount: 3 tablets, Disp: 90 tablet, Rfl: 0  •  pramipexole (MIRAPEX) 0.5 mg tablet, Take 1 tablet (0.5 mg total) by mouth daily at bedtime, Disp: 90 tablet, Rfl: 3  •  pregabalin (LYRICA) 100 mg capsule, Take 1 capsule (100 mg total) by mouth 2 (two) times a day, Disp: 180 capsule, Rfl: 2  •  sildenafil (Viagra) 100 mg tablet, Take 1 tablet (100 mg total) by mouth daily as needed for erectile dysfunction, Disp: 10 tablet, Rfl: 55  •  tamsulosin (FLOMAX) 0.4 mg, Take 1 capsule (0.4 mg total) by mouth daily with dinner, Disp: 90 capsule, Rfl: 2  •  triamterene-hydrochlorothiazide (MAXZIDE-25) 37.5-25 mg per tablet, Take 1 tablet by mouth daily, Disp: 90 tablet, Rfl: 0      Objective:    Vitals:   /78   Pulse 78   Ht 5' 11\" (1.803 m)   Wt 106 kg (234 lb)   SpO2 98%   BMI 32.64 kg/m²   Body mass index is 32.64 kg/m².  Vitals:    08/21/24 1421   Weight: 106 kg (234 lb)       Physical Exam  Vitals and nursing note reviewed.   Constitutional:       General: He is not in acute distress.     Appearance: He is well-developed. He is not ill-appearing, toxic-appearing or diaphoretic.   HENT:      Head: Normocephalic and atraumatic.      Right Ear: External ear normal.      Left Ear: External ear normal.      Nose: Nose normal.      Mouth/Throat:      Pharynx: No oropharyngeal exudate.   Eyes:      General: Lids are normal. Lids are everted, no foreign bodies appreciated. No scleral icterus.        Right eye: No discharge.         Left eye: No discharge.      Conjunctiva/sclera: Conjunctivae normal.      Pupils: Pupils are equal, round, and reactive to light.   Neck:      Thyroid: No thyromegaly. "      Vascular: Normal carotid pulses. No carotid bruit, hepatojugular reflux or JVD.      Trachea: No tracheal tenderness or tracheal deviation.   Cardiovascular:      Rate and Rhythm: Normal rate and regular rhythm.      Pulses: Normal pulses.      Heart sounds: Normal heart sounds. No murmur heard.     No friction rub. No gallop.   Pulmonary:      Effort: Pulmonary effort is normal. No respiratory distress.      Breath sounds: Normal breath sounds. No stridor. No wheezing or rales.   Chest:      Chest wall: No tenderness.   Abdominal:      General: Bowel sounds are normal. There is no distension.      Palpations: Abdomen is soft. There is no mass.      Tenderness: There is no abdominal tenderness. There is no guarding or rebound.   Musculoskeletal:         General: No tenderness or deformity. Normal range of motion.      Cervical back: Normal range of motion and neck supple. No edema, erythema or rigidity. No spinous process tenderness or muscular tenderness. Normal range of motion.   Lymphadenopathy:      Head:      Right side of head: No submental, submandibular, tonsillar, preauricular or posterior auricular adenopathy.      Left side of head: No submental, submandibular, tonsillar, preauricular, posterior auricular or occipital adenopathy.      Cervical: No cervical adenopathy.      Right cervical: No superficial, deep or posterior cervical adenopathy.     Left cervical: No superficial, deep or posterior cervical adenopathy.      Upper Body:      Right upper body: No pectoral adenopathy.      Left upper body: No pectoral adenopathy.   Skin:     General: Skin is warm and dry.      Coloration: Skin is not pale.      Findings: No erythema or rash.   Neurological:      General: No focal deficit present.      Mental Status: He is alert and oriented to person, place, and time.      Cranial Nerves: No cranial nerve deficit.      Sensory: No sensory deficit.      Motor: No tremor, abnormal muscle tone or seizure  "activity.      Coordination: Coordination normal.      Gait: Gait abnormal.      Deep Tendon Reflexes: Reflexes are normal and symmetric. Reflexes normal.   Psychiatric:         Behavior: Behavior normal.         Thought Content: Thought content normal.         Judgment: Judgment normal.       Lab Review   No visits with results within 2 Month(s) from this visit.   Latest known visit with results is:   Orders Only on 05/18/2024   Component Date Value Ref Range Status   • Creatinine(Crt),U 05/18/2024 76.8  50.0 - 200.0 mg/dL Final   • Albumin,U,Random 05/18/2024 <0.7  <3.0 mg/dL Final   • Microalb/Creat Ratio 05/18/2024 Unable to perform calculation.  <30.0 mg/gm CREA Final         Patient Instructions   Pt is symptom free for this problem.  This diagnosis or problem is stable/well controlled  Patient is advised to continue same meds as outlined in medicine list          Wilmar Bradford MD        \"This note has been constructed using a voice recognition system.Therefore there may be syntax, spelling, and/or grammatical errors. Please call if you have any questions. \"  "

## 2024-08-21 NOTE — ASSESSMENT & PLAN NOTE
Percocet 10-3 25 4 times a day was refilled the current regimen helping patient work full-time care of day-to-day activities uncontrolled pain remaining follow-up on finding as follows from a previous progress note the pain contract in place regular drug screen`       Percocet 10/325 4 times a day was refilled  All medical records reviewed and reconciled patient PDMP database reviewed to ensure compliant with the treatment plan for pain management benefits due to the fact that patient has not responded to other measures for pain control so far and severe the pain is significant and interfering with normal daily activities I believe it is reasonable and appropriate to continue the controlled substance in order to improve his ability to function in enhance quality of life the patient has signed a narcotic agreement patient should it was utilize 1 pharmacy and not receiving narcotic from any other provider patient verbalizes understanding that breaching the contract will affect future prescription decision making and critical results in the dismissal from the practice if specifically the has been explained in understood to patient patient demonstrates following informed use of appropriate medicine to analgesia increase activity explained the side effects recommended that thisto the patient patient understands that also advised that appeared to expose him order an abuse and addiction and overdose counseling provided for prevention of any overuse abuse or addiction

## 2024-09-17 ENCOUNTER — RA CDI HCC (OUTPATIENT)
Dept: OTHER | Facility: HOSPITAL | Age: 59
End: 2024-09-17

## 2024-09-25 ENCOUNTER — OFFICE VISIT (OUTPATIENT)
Dept: INTERNAL MEDICINE CLINIC | Facility: CLINIC | Age: 59
End: 2024-09-25
Payer: COMMERCIAL

## 2024-09-25 VITALS
HEIGHT: 71 IN | SYSTOLIC BLOOD PRESSURE: 124 MMHG | OXYGEN SATURATION: 94 % | DIASTOLIC BLOOD PRESSURE: 80 MMHG | BODY MASS INDEX: 31.78 KG/M2 | WEIGHT: 227 LBS | HEART RATE: 70 BPM

## 2024-09-25 DIAGNOSIS — G25.81 RESTLESS LEG: ICD-10-CM

## 2024-09-25 DIAGNOSIS — M48.061 LUMBAR FORAMINAL STENOSIS: ICD-10-CM

## 2024-09-25 DIAGNOSIS — M17.0 PRIMARY OSTEOARTHRITIS OF BOTH KNEES: ICD-10-CM

## 2024-09-25 DIAGNOSIS — M51.26 LUMBAR DISC HERNIATION: Primary | ICD-10-CM

## 2024-09-25 DIAGNOSIS — M47.816 LUMBAR SPONDYLOSIS: ICD-10-CM

## 2024-09-25 PROCEDURE — 99213 OFFICE O/P EST LOW 20 MIN: CPT | Performed by: INTERNAL MEDICINE

## 2024-09-25 RX ORDER — OXYCODONE AND ACETAMINOPHEN 10; 325 MG/1; MG/1
1 TABLET ORAL EVERY 8 HOURS PRN
Qty: 90 TABLET | Refills: 0 | Status: SHIPPED | OUTPATIENT
Start: 2024-09-25

## 2024-09-25 RX ORDER — PRAMIPEXOLE DIHYDROCHLORIDE 0.5 MG/1
1 TABLET ORAL
Qty: 90 TABLET | Refills: 3 | Status: SHIPPED | OUTPATIENT
Start: 2024-09-25

## 2024-09-25 NOTE — ASSESSMENT & PLAN NOTE
Percocet 10-3 25 4 times a day was refilled regimen helping patient control the pain function care day-to-day activities and also work full-time remaining follow-up plan finding counseling as follows       Percocet 10/325 4 times a day was refilled  All medical records reviewed and reconciled patient PDMP database reviewed to ensure compliant with the treatment plan for pain management benefits due to the fact that patient has not responded to other measures for pain control so far and severe the pain is significant and interfering with normal daily activities I believe it is reasonable and appropriate to continue the controlled substance in order to improve his ability to function in enhance quality of life the patient has signed a narcotic agreement patient should it was utilize 1 pharmacy and not receiving narcotic from any other provider patient verbalizes understanding that breaching the contract will affect future prescription decision making and critical results in the dismissal from the practice if specifically the has been explained in understood to patient patient demonstrates following informed use of appropriate medicine to analgesia increase activity explained the side effects recommended that thisto the patient patient understands that also advised that appeared to expose him order an abuse and addiction and overdose counseling provided for prevention of any overuse abuse or addiction

## 2024-09-25 NOTE — PROGRESS NOTES
Dr. Bradford's Office Visit Note  24     Ifeanyi Puga 58 y.o. male MRN: [id number]  : 1965    Assessment:     1. Lumbar disc herniation  Assessment & Plan:  Percocet 10-3 25 4 times a day was refilled regimen helping patient control the pain function care day-to-day activities and also work full-time remaining follow-up plan finding counseling as follows       Percocet 10/325 4 times a day was refilled  All medical records reviewed and reconciled patient PDMP database reviewed to ensure compliant with the treatment plan for pain management benefits due to the fact that patient has not responded to other measures for pain control so far and severe the pain is significant and interfering with normal daily activities I believe it is reasonable and appropriate to continue the controlled substance in order to improve his ability to function in enhance quality of life the patient has signed a narcotic agreement patient should it was utilize 1 pharmacy and not receiving narcotic from any other provider patient verbalizes understanding that breaching the contract will affect future prescription decision making and critical results in the dismissal from the practice if specifically the has been explained in understood to patient patient demonstrates following informed use of appropriate medicine to analgesia increase activity explained the side effects recommended that thisto the patient patient understands that also advised that appeared to expose him order an abuse and addiction and overdose counseling provided for prevention of any overuse abuse or addiction  Orders:  -     oxyCODONE-acetaminophen (PERCOCET)  mg per tablet; Take 1 tablet by mouth every 8 (eight) hours as needed for severe pain Max Daily Amount: 3 tablets  2. Primary osteoarthritis of both knees  -     oxyCODONE-acetaminophen (PERCOCET)  mg per tablet; Take 1 tablet by mouth every 8 (eight) hours as needed for severe pain Max Daily  Amount: 3 tablets  3. Lumbar foraminal stenosis  -     oxyCODONE-acetaminophen (PERCOCET)  mg per tablet; Take 1 tablet by mouth every 8 (eight) hours as needed for severe pain Max Daily Amount: 3 tablets  4. Lumbar spondylosis  Assessment & Plan:  disc herniation, foraminal stenosis, osteoarthritis of the both knee, lumbar spondylosis.   He continues to have low back pain.  Patient continues to have bilateral radiculopathy.  Pain is moderately severe.  Remains on Percocet 3-4 times a day.  No abuse no dependency no side effect.  Pain not controlled with the Tylenol.  Patient continues to have tingling and numbness in the leg.  Known case of neuropathy.  Patient wants to continue to work.  He was advised surgery wants to work for 5 more years does not want to go for surgery..    Remaining follow-up plan finding counseling same is under disc herniation  Orders:  -     oxyCODONE-acetaminophen (PERCOCET)  mg per tablet; Take 1 tablet by mouth every 8 (eight) hours as needed for severe pain Max Daily Amount: 3 tablets        Discussion Summary and Plan:  Today's care plan and medications were reviewed with patient in detail and all their questions answered to their satisfaction.    Chief Complaint   Patient presents with    Follow-up      Subjective:  Came in follow-up chronic medical condition listed visit diagnosis and refill for Percocet 10-3 25 4 times a day was refilled his at nighttime the restless leg syndrome symptoms improved but still cramps and not able to sleep due to the symptoms        The following portions of the patient's history were reviewed and updated as appropriate: allergies, current medications, past family history, past medical history, past social history, past surgical history and problem list.    Review of Systems   Constitutional:  Positive for activity change. Negative for appetite change, chills, diaphoresis, fatigue, fever and unexpected weight change.   HENT:  Negative for  congestion, dental problem, drooling, ear discharge, ear pain, facial swelling, hearing loss, mouth sores, nosebleeds, postnasal drip, rhinorrhea, sinus pressure, sneezing, sore throat, tinnitus, trouble swallowing and voice change.    Eyes:  Negative for photophobia, pain, discharge, redness, itching and visual disturbance.   Respiratory:  Negative for apnea, cough, choking, chest tightness, shortness of breath, wheezing and stridor.    Cardiovascular:  Negative for chest pain, palpitations and leg swelling.   Gastrointestinal:  Negative for abdominal distention, abdominal pain, anal bleeding, blood in stool, constipation, diarrhea, nausea, rectal pain and vomiting.   Endocrine: Negative for cold intolerance, heat intolerance, polydipsia, polyphagia and polyuria.   Genitourinary:  Negative for decreased urine volume, difficulty urinating, dysuria, enuresis, flank pain, frequency, genital sores, hematuria and urgency.   Musculoskeletal:  Positive for arthralgias, back pain, gait problem and joint swelling. Negative for neck stiffness.   Skin:  Negative for color change, pallor, rash and wound.   Allergic/Immunologic: Negative.  Negative for environmental allergies, food allergies and immunocompromised state.   Neurological:  Negative for dizziness, tremors, seizures, syncope, facial asymmetry, speech difficulty, weakness, light-headedness, numbness and headaches.   Psychiatric/Behavioral:  Negative for agitation, behavioral problems, confusion, decreased concentration, dysphoric mood, hallucinations, self-injury, sleep disturbance and suicidal ideas. The patient is not nervous/anxious and is not hyperactive.          Historical Information   Patient Active Problem List   Diagnosis    Benign essential hypertension    Benign prostatic hyperplasia    DDD (degenerative disc disease), lumbar    Incomplete emptying of bladder    Lumbar disc herniation    Lumbar foraminal stenosis    Neuropathy    Chronic intractable pain     Mild episode of recurrent major depressive disorder (HCC)    Hematospermia    Prostatitis    Other male erectile dysfunction    Annual physical exam    Primary osteoarthritis of both knees    Chronic cough    Non-seasonal allergic rhinitis due to pollen    Continuous opioid dependence (HCC)    Pain    Swelling    Intermittent claudication due to atherosclerosis of artery of extremity (HCC)    Lumbar spondylosis    Gastric bypass status for obesity    Morbid (severe) obesity due to excess calories (HCC)    Uncomplicated opioid dependence (HCC)    Allergic reaction    Prediabetes    Mixed hyperlipidemia    Restless leg     Past Medical History:   Diagnosis Date    Abdominal pain     Anxiety disorder     Arthritis     Back pain     Dizziness     Headache     Hypertension     Lightheadedness     Obesity     Palpitations     SOB (shortness of breath)      Past Surgical History:   Procedure Laterality Date    CHOLECYSTECTOMY      GASTRIC BYPASS       Social History     Substance and Sexual Activity   Alcohol Use None    Comment: none per [facility]     Social History     Substance and Sexual Activity   Drug Use Not on file    Comment: none per [facility]     Social History     Tobacco Use   Smoking Status Never   Smokeless Tobacco Never     History reviewed. No pertinent family history.  Health Maintenance Due   Topic    Hepatitis C Screening     HIV Screening     Zoster Vaccine (1 of 2)    Annual Physical     COVID-19 Vaccine (7 - 2023-24 season)    Influenza Vaccine (1)      Meds/Allergies       Current Outpatient Medications:     amlodipine-olmesartan (Soni) 5-40 MG, Take 1 tablet by mouth daily, Disp: 90 tablet, Rfl: 2    celecoxib (CeleBREX) 200 mg capsule, Take 1 capsule (200 mg total) by mouth 2 (two) times a day, Disp: 180 capsule, Rfl: 3    cetirizine (ZyrTEC) 10 mg tablet, Take 1 tablet (10 mg total) by mouth daily, Disp: 30 tablet, Rfl: 2    escitalopram (LEXAPRO) 20 mg tablet, Take 1 tablet (20 mg total) by mouth  "daily, Disp: 90 tablet, Rfl: 3    fluticasone (FLONASE) 50 mcg/act nasal spray, 1 spray into each nostril daily, Disp: 18.2 mL, Rfl: 2    oxyCODONE-acetaminophen (PERCOCET)  mg per tablet, Take 1 tablet by mouth every 8 (eight) hours as needed for severe pain Max Daily Amount: 3 tablets, Disp: 90 tablet, Rfl: 0    pramipexole (MIRAPEX) 0.5 mg tablet, Take 1 tablet (0.5 mg total) by mouth daily at bedtime, Disp: 90 tablet, Rfl: 3    pregabalin (LYRICA) 100 mg capsule, Take 1 capsule (100 mg total) by mouth 2 (two) times a day, Disp: 180 capsule, Rfl: 2    sildenafil (Viagra) 100 mg tablet, Take 1 tablet (100 mg total) by mouth daily as needed for erectile dysfunction, Disp: 10 tablet, Rfl: 55    tamsulosin (FLOMAX) 0.4 mg, Take 1 capsule (0.4 mg total) by mouth daily with dinner, Disp: 90 capsule, Rfl: 2    triamterene-hydrochlorothiazide (MAXZIDE-25) 37.5-25 mg per tablet, Take 1 tablet by mouth daily, Disp: 90 tablet, Rfl: 0      Objective:    Vitals:   /80   Pulse 70   Ht 5' 11\" (1.803 m)   Wt 103 kg (227 lb)   SpO2 94%   BMI 31.66 kg/m²   Body mass index is 31.66 kg/m².  Vitals:    09/25/24 1338   Weight: 103 kg (227 lb)       Physical Exam  Vitals and nursing note reviewed.   Constitutional:       General: He is not in acute distress.     Appearance: He is well-developed. He is not ill-appearing, toxic-appearing or diaphoretic.   HENT:      Head: Normocephalic and atraumatic.      Right Ear: External ear normal.      Left Ear: External ear normal.      Nose: Nose normal.      Mouth/Throat:      Pharynx: No oropharyngeal exudate.   Eyes:      General: Lids are normal. Lids are everted, no foreign bodies appreciated. No scleral icterus.        Right eye: No discharge.         Left eye: No discharge.      Conjunctiva/sclera: Conjunctivae normal.      Pupils: Pupils are equal, round, and reactive to light.   Neck:      Thyroid: No thyromegaly.      Vascular: Normal carotid pulses. No carotid bruit, " hepatojugular reflux or JVD.      Trachea: No tracheal tenderness or tracheal deviation.   Cardiovascular:      Rate and Rhythm: Normal rate and regular rhythm.      Pulses: Normal pulses.      Heart sounds: Normal heart sounds. No murmur heard.     No friction rub. No gallop.   Pulmonary:      Effort: Pulmonary effort is normal. No respiratory distress.      Breath sounds: Normal breath sounds. No stridor. No wheezing or rales.   Chest:      Chest wall: No tenderness.   Abdominal:      General: Bowel sounds are normal. There is no distension.      Palpations: Abdomen is soft. There is no mass.      Tenderness: There is no abdominal tenderness. There is no guarding or rebound.   Musculoskeletal:         General: No tenderness or deformity. Normal range of motion.      Cervical back: Normal range of motion and neck supple. No edema, erythema or rigidity. No spinous process tenderness or muscular tenderness. Normal range of motion.   Lymphadenopathy:      Head:      Right side of head: No submental, submandibular, tonsillar, preauricular or posterior auricular adenopathy.      Left side of head: No submental, submandibular, tonsillar, preauricular, posterior auricular or occipital adenopathy.      Cervical: No cervical adenopathy.      Right cervical: No superficial, deep or posterior cervical adenopathy.     Left cervical: No superficial, deep or posterior cervical adenopathy.      Upper Body:      Right upper body: No pectoral adenopathy.      Left upper body: No pectoral adenopathy.   Skin:     General: Skin is warm and dry.      Coloration: Skin is not pale.      Findings: No erythema or rash.   Neurological:      General: No focal deficit present.      Mental Status: He is alert and oriented to person, place, and time.      Cranial Nerves: No cranial nerve deficit.      Sensory: No sensory deficit.      Motor: No tremor, abnormal muscle tone or seizure activity.      Coordination: Coordination normal.      Gait:  "Gait abnormal.      Deep Tendon Reflexes: Reflexes are normal and symmetric. Reflexes normal.   Psychiatric:         Behavior: Behavior normal.         Thought Content: Thought content normal.         Judgment: Judgment normal.         Lab Review   No visits with results within 2 Month(s) from this visit.   Latest known visit with results is:   Orders Only on 05/18/2024   Component Date Value Ref Range Status    Creatinine(Crt),U 05/18/2024 76.8  50.0 - 200.0 mg/dL Final    Albumin,U,Random 05/18/2024 <0.7  <3.0 mg/dL Final    Microalb/Creat Ratio 05/18/2024 Unable to perform calculation.  <30.0 mg/gm CREA Final         There are no Patient Instructions on file for this visit.     Wilmar Bradford MD        \"This note has been constructed using a voice recognition system.Therefore there may be syntax, spelling, and/or grammatical errors. Please call if you have any questions. \"  "

## 2024-09-25 NOTE — ASSESSMENT & PLAN NOTE
disc herniation, foraminal stenosis, osteoarthritis of the both knee, lumbar spondylosis.   He continues to have low back pain.  Patient continues to have bilateral radiculopathy.  Pain is moderately severe.  Remains on Percocet 3-4 times a day.  No abuse no dependency no side effect.  Pain not controlled with the Tylenol.  Patient continues to have tingling and numbness in the leg.  Known case of neuropathy.  Patient wants to continue to work.  He was advised surgery wants to work for 5 more years does not want to go for surgery..    Remaining follow-up plan finding counseling same is under disc herniation

## 2024-10-23 ENCOUNTER — OFFICE VISIT (OUTPATIENT)
Dept: INTERNAL MEDICINE CLINIC | Facility: CLINIC | Age: 59
End: 2024-10-23
Payer: COMMERCIAL

## 2024-10-23 VITALS
DIASTOLIC BLOOD PRESSURE: 80 MMHG | HEART RATE: 79 BPM | SYSTOLIC BLOOD PRESSURE: 138 MMHG | WEIGHT: 226 LBS | HEIGHT: 71 IN | BODY MASS INDEX: 31.64 KG/M2 | OXYGEN SATURATION: 99 %

## 2024-10-23 DIAGNOSIS — M17.0 PRIMARY OSTEOARTHRITIS OF BOTH KNEES: ICD-10-CM

## 2024-10-23 DIAGNOSIS — M48.061 LUMBAR FORAMINAL STENOSIS: ICD-10-CM

## 2024-10-23 DIAGNOSIS — J30.1 ALLERGIC RHINITIS DUE TO POLLEN, UNSPECIFIED SEASONALITY: ICD-10-CM

## 2024-10-23 DIAGNOSIS — M51.26 LUMBAR DISC HERNIATION: Primary | ICD-10-CM

## 2024-10-23 DIAGNOSIS — M47.816 LUMBAR SPONDYLOSIS: ICD-10-CM

## 2024-10-23 PROCEDURE — 99213 OFFICE O/P EST LOW 20 MIN: CPT | Performed by: INTERNAL MEDICINE

## 2024-10-23 RX ORDER — OXYCODONE AND ACETAMINOPHEN 10; 325 MG/1; MG/1
1 TABLET ORAL EVERY 8 HOURS PRN
Qty: 90 TABLET | Refills: 0 | Status: SHIPPED | OUTPATIENT
Start: 2024-10-23

## 2024-10-23 RX ORDER — FLUTICASONE PROPIONATE 50 MCG
1 SPRAY, SUSPENSION (ML) NASAL 2 TIMES DAILY
Qty: 18.2 ML | Refills: 2 | Status: SHIPPED | OUTPATIENT
Start: 2024-10-23

## 2024-10-23 NOTE — PROGRESS NOTES
Dr. Bradford's Office Visit Note  10/23/24     Ifeanyi Puga 58 y.o. male MRN: [id number]  : 1965    Assessment:     1. Allergic rhinitis due to pollen, unspecified seasonality  -     fluticasone (FLONASE) 50 mcg/act nasal spray; 1 spray into each nostril 2 (two) times a day  2. Lumbar disc herniation  Assessment & Plan:  Percocet 10-3 25 4 times a day was refilled regimen helping patient control the pain function carry out day-to-day activities and work full-time been evaluated by pain management spine recommended surgery underwent epidural injection no improvement also underwent physical therapy no improvement remaining follow-up on finding as follows from a previous progress note       Percocet 10/325 4 times a day was refilled  All medical records reviewed and reconciled patient PDMP database reviewed to ensure compliant with the treatment plan for pain management benefits due to the fact that patient has not responded to other measures for pain control so far and severe the pain is significant and interfering with normal daily activities I believe it is reasonable and appropriate to continue the controlled substance in order to improve his ability to function in enhance quality of life the patient has signed a narcotic agreement patient should it was utilize 1 pharmacy and not receiving narcotic from any other provider patient verbalizes understanding that breaching the contract will affect future prescription decision making and critical results in the dismissal from the practice if specifically the has been explained in understood to patient patient demonstrates following informed use of appropriate medicine to analgesia increase activity explained the side effects recommended that thisto the patient patient understands that also advised that appeared to expose him order an abuse and addiction and overdose counseling provided for prevention of any overuse abuse or addiction  Orders:  -      oxyCODONE-acetaminophen (PERCOCET)  mg per tablet; Take 1 tablet by mouth every 8 (eight) hours as needed for severe pain Max Daily Amount: 3 tablets  3. Primary osteoarthritis of both knees  -     oxyCODONE-acetaminophen (PERCOCET)  mg per tablet; Take 1 tablet by mouth every 8 (eight) hours as needed for severe pain Max Daily Amount: 3 tablets  4. Lumbar foraminal stenosis  Assessment & Plan:  Same as under lumbar disc herniation Percocet was refilled  Orders:  -     oxyCODONE-acetaminophen (PERCOCET)  mg per tablet; Take 1 tablet by mouth every 8 (eight) hours as needed for severe pain Max Daily Amount: 3 tablets  5. Lumbar spondylosis  Assessment & Plan:  disc herniation, foraminal stenosis, osteoarthritis of the both knee, lumbar spondylosis.   He continues to have low back pain.  Patient continues to have bilateral radiculopathy.  Pain is moderately severe.  Remains on Percocet 3-4 times a day.  No abuse no dependency no side effect.  Pain not controlled with the Tylenol.  Patient continues to have tingling and numbness in the leg.  Known case of neuropathy.  Patient wants to continue to work.  He was advised surgery wants to work for 5 more years does not want to go for surgery..    Remaining follow-up plan finding counseling same is under disc herniation  Orders:  -     oxyCODONE-acetaminophen (PERCOCET)  mg per tablet; Take 1 tablet by mouth every 8 (eight) hours as needed for severe pain Max Daily Amount: 3 tablets        Discussion Summary and Plan:  Today's care plan and medications were reviewed with patient in detail and all their questions answered to their satisfaction.    Chief Complaint   Patient presents with    Follow-up     Sinus problem stuffy runny nose.      Subjective:  Came in follow-up chronic medical condition listed visit diagnosis mainly refill for Percocet 10-3 25 4 times a day was refilled remaining follow-up plan finding same as under visit  diagnosis        The following portions of the patient's history were reviewed and updated as appropriate: allergies, current medications, past family history, past medical history, past social history, past surgical history and problem list.    Review of Systems   Constitutional:  Positive for activity change. Negative for appetite change, chills, diaphoresis, fatigue, fever and unexpected weight change.   HENT:  Negative for congestion, dental problem, drooling, ear discharge, ear pain, facial swelling, hearing loss, mouth sores, nosebleeds, postnasal drip, rhinorrhea, sinus pressure, sneezing, sore throat, tinnitus, trouble swallowing and voice change.    Eyes:  Negative for photophobia, pain, discharge, redness, itching and visual disturbance.   Respiratory:  Negative for apnea, cough, choking, chest tightness, shortness of breath, wheezing and stridor.    Cardiovascular:  Negative for chest pain, palpitations and leg swelling.   Gastrointestinal:  Negative for abdominal distention, abdominal pain, anal bleeding, blood in stool, constipation, diarrhea, nausea, rectal pain and vomiting.   Endocrine: Negative for cold intolerance, heat intolerance, polydipsia, polyphagia and polyuria.   Genitourinary:  Negative for decreased urine volume, difficulty urinating, dysuria, enuresis, flank pain, frequency, genital sores, hematuria and urgency.   Musculoskeletal:  Positive for arthralgias, back pain, gait problem and joint swelling. Negative for neck stiffness.   Skin:  Negative for color change, pallor, rash and wound.   Allergic/Immunologic: Negative.  Negative for environmental allergies, food allergies and immunocompromised state.   Neurological:  Negative for dizziness, tremors, seizures, syncope, facial asymmetry, speech difficulty, weakness, light-headedness, numbness and headaches.   Psychiatric/Behavioral:  Negative for agitation, behavioral problems, confusion, decreased concentration, dysphoric mood,  hallucinations, self-injury, sleep disturbance and suicidal ideas. The patient is not nervous/anxious and is not hyperactive.          Historical Information   Patient Active Problem List   Diagnosis    Benign essential hypertension    Benign prostatic hyperplasia    DDD (degenerative disc disease), lumbar    Incomplete emptying of bladder    Lumbar disc herniation    Lumbar foraminal stenosis    Neuropathy    Chronic intractable pain    Mild episode of recurrent major depressive disorder (HCC)    Hematospermia    Prostatitis    Other male erectile dysfunction    Annual physical exam    Primary osteoarthritis of both knees    Chronic cough    Non-seasonal allergic rhinitis due to pollen    Continuous opioid dependence (HCC)    Pain    Swelling    Intermittent claudication due to atherosclerosis of artery of extremity (HCC)    Lumbar spondylosis    Gastric bypass status for obesity    Morbid (severe) obesity due to excess calories (HCC)    Uncomplicated opioid dependence (HCC)    Allergic reaction    Prediabetes    Mixed hyperlipidemia    Restless leg     Past Medical History:   Diagnosis Date    Abdominal pain     Anxiety disorder     Arthritis     Back pain     Dizziness     Headache     Hypertension     Lightheadedness     Obesity     Palpitations     SOB (shortness of breath)      Past Surgical History:   Procedure Laterality Date    CHOLECYSTECTOMY      GASTRIC BYPASS       Social History     Substance and Sexual Activity   Alcohol Use None    Comment: none per Trish     Social History     Substance and Sexual Activity   Drug Use Not on file    Comment: none per Trish     Social History     Tobacco Use   Smoking Status Never   Smokeless Tobacco Never     History reviewed. No pertinent family history.  Health Maintenance Due   Topic    Hepatitis C Screening     HIV Screening     Zoster Vaccine (1 of 2)    Annual Physical     Influenza Vaccine (1)    COVID-19 Vaccine (7 - 2023-24 season)      Meds/Allergies  "      Current Outpatient Medications:     amlodipine-olmesartan (Soni) 5-40 MG, Take 1 tablet by mouth daily, Disp: 90 tablet, Rfl: 2    celecoxib (CeleBREX) 200 mg capsule, Take 1 capsule (200 mg total) by mouth 2 (two) times a day, Disp: 180 capsule, Rfl: 3    cetirizine (ZyrTEC) 10 mg tablet, Take 1 tablet (10 mg total) by mouth daily, Disp: 30 tablet, Rfl: 2    escitalopram (LEXAPRO) 20 mg tablet, Take 1 tablet (20 mg total) by mouth daily, Disp: 90 tablet, Rfl: 3    fluticasone (FLONASE) 50 mcg/act nasal spray, 1 spray into each nostril 2 (two) times a day, Disp: 18.2 mL, Rfl: 2    oxyCODONE-acetaminophen (PERCOCET)  mg per tablet, Take 1 tablet by mouth every 8 (eight) hours as needed for severe pain Max Daily Amount: 3 tablets, Disp: 90 tablet, Rfl: 0    pramipexole (MIRAPEX) 0.5 mg tablet, Take 2 tablets (1 mg total) by mouth daily at bedtime, Disp: 90 tablet, Rfl: 3    pregabalin (LYRICA) 100 mg capsule, Take 1 capsule (100 mg total) by mouth 2 (two) times a day, Disp: 180 capsule, Rfl: 2    sildenafil (Viagra) 100 mg tablet, Take 1 tablet (100 mg total) by mouth daily as needed for erectile dysfunction, Disp: 10 tablet, Rfl: 55    tamsulosin (FLOMAX) 0.4 mg, Take 1 capsule (0.4 mg total) by mouth daily with dinner, Disp: 90 capsule, Rfl: 2    triamterene-hydrochlorothiazide (MAXZIDE-25) 37.5-25 mg per tablet, Take 1 tablet by mouth daily, Disp: 90 tablet, Rfl: 0      Objective:    Vitals:   /80   Pulse 79   Ht 5' 11\" (1.803 m)   Wt 103 kg (226 lb)   SpO2 99%   BMI 31.52 kg/m²   Body mass index is 31.52 kg/m².  Vitals:    10/23/24 1631   Weight: 103 kg (226 lb)       Physical Exam  Vitals and nursing note reviewed.   Constitutional:       General: He is not in acute distress.     Appearance: He is well-developed. He is not ill-appearing, toxic-appearing or diaphoretic.   HENT:      Head: Normocephalic and atraumatic.      Right Ear: External ear normal.      Left Ear: External ear normal. "      Nose: Nose normal.      Mouth/Throat:      Pharynx: No oropharyngeal exudate.   Eyes:      General: Lids are normal. Lids are everted, no foreign bodies appreciated. No scleral icterus.        Right eye: No discharge.         Left eye: No discharge.      Conjunctiva/sclera: Conjunctivae normal.      Pupils: Pupils are equal, round, and reactive to light.   Neck:      Thyroid: No thyromegaly.      Vascular: Normal carotid pulses. No carotid bruit, hepatojugular reflux or JVD.      Trachea: No tracheal tenderness or tracheal deviation.   Cardiovascular:      Rate and Rhythm: Normal rate and regular rhythm.      Pulses: Normal pulses.      Heart sounds: Normal heart sounds. No murmur heard.     No friction rub. No gallop.   Pulmonary:      Effort: Pulmonary effort is normal. No respiratory distress.      Breath sounds: Normal breath sounds. No stridor. No wheezing or rales.   Chest:      Chest wall: No tenderness.   Abdominal:      General: Bowel sounds are normal. There is no distension.      Palpations: Abdomen is soft. There is no mass.      Tenderness: There is no abdominal tenderness. There is no guarding or rebound.   Musculoskeletal:         General: No tenderness or deformity. Normal range of motion.      Cervical back: Normal range of motion and neck supple. No edema, erythema or rigidity. No spinous process tenderness or muscular tenderness. Normal range of motion.   Lymphadenopathy:      Head:      Right side of head: No submental, submandibular, tonsillar, preauricular or posterior auricular adenopathy.      Left side of head: No submental, submandibular, tonsillar, preauricular, posterior auricular or occipital adenopathy.      Cervical: No cervical adenopathy.      Right cervical: No superficial, deep or posterior cervical adenopathy.     Left cervical: No superficial, deep or posterior cervical adenopathy.      Upper Body:      Right upper body: No pectoral adenopathy.      Left upper body: No  "pectoral adenopathy.   Skin:     General: Skin is warm and dry.      Coloration: Skin is not pale.      Findings: No erythema or rash.   Neurological:      General: No focal deficit present.      Mental Status: He is alert and oriented to person, place, and time.      Cranial Nerves: No cranial nerve deficit.      Sensory: No sensory deficit.      Motor: No tremor, abnormal muscle tone or seizure activity.      Coordination: Coordination normal.      Gait: Gait abnormal.      Deep Tendon Reflexes: Reflexes are normal and symmetric. Reflexes normal.   Psychiatric:         Behavior: Behavior normal.         Thought Content: Thought content normal.         Judgment: Judgment normal.         Lab Review   No visits with results within 2 Month(s) from this visit.   Latest known visit with results is:   Orders Only on 05/18/2024   Component Date Value Ref Range Status    Creatinine(Crt),U 05/18/2024 76.8  50.0 - 200.0 mg/dL Final    Albumin,U,Random 05/18/2024 <0.7  <3.0 mg/dL Final    Microalb/Creat Ratio 05/18/2024 Unable to perform calculation.  <30.0 mg/gm CREA Final         There are no Patient Instructions on file for this visit.     Wilmar Bradford MD        \"This note has been constructed using a voice recognition system.Therefore there may be syntax, spelling, and/or grammatical errors. Please call if you have any questions. \"  "

## 2024-10-23 NOTE — PATIENT INSTRUCTIONS
"Patient Education     Chronic pain   The Basics   Written by the doctors and editors at [facility]   What is chronic pain? -- This is pain that lasts longer than 3 to 6 months. In many cases, this means that pain continues even after the injury or condition that first caused it has healed.  Pain can affect the body in different ways. For example, pain can be:   An ache deep inside the muscle or bone   Stabbing or shooting, often with tingling or numbness   Dull and throbbing  People who have chronic pain might have a hard time doing their usual activities, such as bathing or dressing. This can lead to depression and anxiety. It can also cause problems with sleep.  What causes chronic pain? -- It is not always clear. Sometimes, it is caused by an ongoing medical problem, such as arthritis or nerve damage from diabetes. But doctors cannot always find the cause of chronic pain.  In some cases, people with chronic pain must accept that their pain will never be explained. This means that they have to work with their health care team to address the pain, even if they don't know its cause.  Will I need tests? -- Your doctor might do tests to figure out the cause of your pain. For example, you might get:   Blood tests - These can check for infection, signs of inflammation, or diseases that can cause pain.   X-rays or other imaging tests - Imaging tests create pictures of the inside of the body. They can check for bone fractures, joint damage, cancer, or other changes in your body that could cause pain.   Nerve tests - These are ways to check whether the nerves are working normally.  However, tests cannot always show the cause of pain. Scientists think that in some people, the pain signals in the brain stop working normally. The signals get \"stuck\" in the on position, even when the source of pain is gone.  How is chronic pain treated? -- There are many different ways to manage chronic pain. Most people need to try different " combinations. The right approach for you depends on your pain as well as your lifestyle and preferences.  Treatment options include:   Non-medicine techniques - Doctors recommend first trying to manage pain without medicines. This can involve lots of different things, such as:   Working with a counselor or therapist   Physical therapy or an exercise program   Lifestyle and behavior changes to improve your mood, sleep, diet, and stress level   Acupuncture   Massage   Ice or heat   Devices that affect nerve signals   Medicines - When the above methods are not enough to relieve pain, doctors can recommend medicines. Different medicines work in different ways. Some pain medicines, like opioids, are not recommended for treating chronic pain in most cases. That's because they come with serious risks. Doctors usually try other medicines first.  If your doctor suggests a medicine that seems strange, keep an open mind. Sometimes, doctors treat pain with medicines made to treat other medical problems. For example, medicines for depression can help with pain, because they work on areas of the brain that process pain. Doctors can also use medicines for seizures to treat pain, because they help with overactive nerves.   Other treatments - These might include:   Injections (shots) of numbing or pain-relieving medicines   Surgery  To find the best treatment for you:   Be open to trying new treatments and combinations of treatments. Sometimes, you have to try a few different options before you find one that works best.   Set realistic goals for your treatment. Even if you can't completely get rid of your pain, you might be able to control it enough so you can do the activities you like.  Is there anything I can do on my own to feel better? -- Yes. It can help to:   Try things like heat, cold, or massage - Depending on the cause of your pain, these things might help. Check with your doctor to make sure that it is OK for your  condition.   Practice relaxing - You can learn methods to relax your body, such as doing deep breathing exercises. Ask your doctor or nurse about these methods. Relaxing the mind can help with how the body feels pain. People can learn to quiet their pain or make it less bothersome. It can also help to make changes to improve your sleep habits.   Stay as active as possible - Walking, swimming, anju chi (a kind of martial art), or biking can all help ease muscle and joint pain. Even gentle forms of exercise are good for your health. If you are not active, your pain might get worse.  If you haven't been active for a while, start slowly. Make small increases in the intensity and amount of time you spend exercising. If exercising makes your pain worse, talk with your doctor. They might recommend a program that can help you get more active.  What medical care will I need? -- Many people need a team to help manage their care. A treatment team usually includes:   Doctors or specialists   A physical therapist   Someone trained in mental health, like a  or counselor  Your team will probably want to see you regularly:   They will ask you questions about your pain and other symptoms. They will also work with you to learn how treatment is working and make changes if needed.   They will ask you about your mood and your mental health. Depression and chronic pain can make each other worse. If you have depression, they might suggest treatment for it.  As you get better at managing your pain, you might see your team less frequently.  All topics are updated as new evidence becomes available and our peer review process is complete.  This topic retrieved from [employer] on: Mar 29, 2024.  Topic 71102 Version 18.0  Release: 32.2.4 - C32.87  © 2024 UpToDate, Inc. and/or its affiliates. All rights reserved.  Consumer Information Use and Disclaimer   Disclaimer: This generalized information is a limited summary of diagnosis,  treatment, and/or medication information. It is not meant to be comprehensive and should be used as a tool to help the user understand and/or assess potential diagnostic and treatment options. It does NOT include all information about conditions, treatments, medications, side effects, or risks that may apply to a specific patient. It is not intended to be medical advice or a substitute for the medical advice, diagnosis, or treatment of a health care provider based on the health care provider's examination and assessment of a patient's specific and unique circumstances. Patients must speak with a health care provider for complete information about their health, medical questions, and treatment options, including any risks or benefits regarding use of medications. This information does not endorse any treatments or medications as safe, effective, or approved for treating a specific patient. UpToDate, Inc. and its affiliates disclaim any warranty or liability relating to this information or the use thereof.The use of this information is governed by the Terms of Use, available at https://www.[employer].com/en/know/clinical-effectiveness-terms. 2024© UpToDate, Inc. and its affiliates and/or licensors. All rights reserved.  Copyright   © 2024 UpToDate, Inc. and/or its affiliates. All rights reserved.

## 2024-11-06 DIAGNOSIS — I10 BENIGN ESSENTIAL HYPERTENSION: ICD-10-CM

## 2024-11-06 DIAGNOSIS — M51.26 LUMBAR DISC HERNIATION: ICD-10-CM

## 2024-11-06 DIAGNOSIS — M17.0 PRIMARY OSTEOARTHRITIS OF BOTH KNEES: ICD-10-CM

## 2024-11-06 NOTE — TELEPHONE ENCOUNTER
Medication: triamterene-hydrochlorothiazide (MAXZIDE-25) 37.5-25 mg per tablet      Dose/Frequency: Daily     Quantity: 90 tablet     Pharmacy: [facility] [address]  [address] [id number]  Phone: [phone]  Fax: [phone]  JASON #: --    Office:   [x] PCP/Provider -   [] Speciality/Provider -     Does the patient have enough for 3 days?   [] Yes   [x] No - Send as HP to POD    Medication: celecoxib (CeleBREX) 200 mg capsule     Dose/Frequency:  2 times daily     Quantity: 180 capsule     Pharmacy: [facility] [address]  [address] [id number]  Phone: [phone]  Fax: [phone]  JASON #: --    Office:   [x] PCP/Provider -   [] Speciality/Provider -     Does the patient have enough for 3 days?   [] Yes   [x] No - Send as HP to POD    Pt does not have any medication left. Pt's spouse was told by the pharmacy celecoxib (CeleBREX) was cancelled by pharmacy. Please advise.

## 2024-11-07 RX ORDER — CELECOXIB 200 MG/1
200 CAPSULE ORAL 2 TIMES DAILY
Qty: 180 CAPSULE | Refills: 1 | Status: SHIPPED | OUTPATIENT
Start: 2024-11-07

## 2024-11-07 RX ORDER — TRIAMTERENE AND HYDROCHLOROTHIAZIDE 37.5; 25 MG/1; MG/1
1 TABLET ORAL DAILY
Qty: 90 TABLET | Refills: 1 | Status: SHIPPED | OUTPATIENT
Start: 2024-11-07

## 2024-11-20 ENCOUNTER — OFFICE VISIT (OUTPATIENT)
Dept: INTERNAL MEDICINE CLINIC | Facility: CLINIC | Age: 59
End: 2024-11-20
Payer: COMMERCIAL

## 2024-11-20 VITALS
HEART RATE: 84 BPM | OXYGEN SATURATION: 99 % | HEIGHT: 71 IN | BODY MASS INDEX: 31.64 KG/M2 | SYSTOLIC BLOOD PRESSURE: 150 MMHG | DIASTOLIC BLOOD PRESSURE: 86 MMHG | WEIGHT: 226 LBS

## 2024-11-20 DIAGNOSIS — M48.061 LUMBAR FORAMINAL STENOSIS: ICD-10-CM

## 2024-11-20 DIAGNOSIS — M51.26 LUMBAR DISC HERNIATION: ICD-10-CM

## 2024-11-20 DIAGNOSIS — M47.816 LUMBAR SPONDYLOSIS: ICD-10-CM

## 2024-11-20 DIAGNOSIS — M17.0 PRIMARY OSTEOARTHRITIS OF BOTH KNEES: ICD-10-CM

## 2024-11-20 DIAGNOSIS — M47.16 LUMBAR SPONDYLOSIS WITH MYELOPATHY: ICD-10-CM

## 2024-11-20 DIAGNOSIS — J45.20 MILD INTERMITTENT ASTHMA WITHOUT COMPLICATION: Primary | ICD-10-CM

## 2024-11-20 PROCEDURE — 99213 OFFICE O/P EST LOW 20 MIN: CPT | Performed by: INTERNAL MEDICINE

## 2024-11-20 RX ORDER — OXYCODONE AND ACETAMINOPHEN 10; 325 MG/1; MG/1
1 TABLET ORAL EVERY 8 HOURS PRN
Qty: 90 TABLET | Refills: 0 | Status: SHIPPED | OUTPATIENT
Start: 2024-11-20

## 2024-11-20 RX ORDER — FLUTICASONE FUROATE AND VILANTEROL 200; 25 UG/1; UG/1
1 POWDER RESPIRATORY (INHALATION) DAILY
Qty: 60 BLISTER | Refills: 5 | Status: SHIPPED | OUTPATIENT
Start: 2024-11-20 | End: 2025-05-19

## 2024-11-20 NOTE — PATIENT INSTRUCTIONS
"Patient Education     Chronic pain   The Basics   Written by the doctors and editors at [facility]   What is chronic pain? -- This is pain that lasts longer than 3 to 6 months. In many cases, this means that pain continues even after the injury or condition that first caused it has healed.  Pain can affect the body in different ways. For example, pain can be:   An ache deep inside the muscle or bone   Stabbing or shooting, often with tingling or numbness   Dull and throbbing  People who have chronic pain might have a hard time doing their usual activities, such as bathing or dressing. This can lead to depression and anxiety. It can also cause problems with sleep.  What causes chronic pain? -- It is not always clear. Sometimes, it is caused by an ongoing medical problem, such as arthritis or nerve damage from diabetes. But doctors cannot always find the cause of chronic pain.  In some cases, people with chronic pain must accept that their pain will never be explained. This means that they have to work with their health care team to address the pain, even if they don't know its cause.  Will I need tests? -- Your doctor might do tests to figure out the cause of your pain. For example, you might get:   Blood tests - These can check for infection, signs of inflammation, or diseases that can cause pain.   X-rays or other imaging tests - Imaging tests create pictures of the inside of the body. They can check for bone fractures, joint damage, cancer, or other changes in your body that could cause pain.   Nerve tests - These are ways to check whether the nerves are working normally.  However, tests cannot always show the cause of pain. Scientists think that in some people, the pain signals in the brain stop working normally. The signals get \"stuck\" in the on position, even when the source of pain is gone.  How is chronic pain treated? -- There are many different ways to manage chronic pain. Most people need to try different " combinations. The right approach for you depends on your pain as well as your lifestyle and preferences.  Treatment options include:   Non-medicine techniques - Doctors recommend first trying to manage pain without medicines. This can involve lots of different things, such as:   Working with a counselor or therapist   Physical therapy or an exercise program   Lifestyle and behavior changes to improve your mood, sleep, diet, and stress level   Acupuncture   Massage   Ice or heat   Devices that affect nerve signals   Medicines - When the above methods are not enough to relieve pain, doctors can recommend medicines. Different medicines work in different ways. Some pain medicines, like opioids, are not recommended for treating chronic pain in most cases. That's because they come with serious risks. Doctors usually try other medicines first.  If your doctor suggests a medicine that seems strange, keep an open mind. Sometimes, doctors treat pain with medicines made to treat other medical problems. For example, medicines for depression can help with pain, because they work on areas of the brain that process pain. Doctors can also use medicines for seizures to treat pain, because they help with overactive nerves.   Other treatments - These might include:   Injections (shots) of numbing or pain-relieving medicines   Surgery  To find the best treatment for you:   Be open to trying new treatments and combinations of treatments. Sometimes, you have to try a few different options before you find one that works best.   Set realistic goals for your treatment. Even if you can't completely get rid of your pain, you might be able to control it enough so you can do the activities you like.  Is there anything I can do on my own to feel better? -- Yes. It can help to:   Try things like heat, cold, or massage - Depending on the cause of your pain, these things might help. Check with your doctor to make sure that it is OK for your  condition.   Practice relaxing - You can learn methods to relax your body, such as doing deep breathing exercises. Ask your doctor or nurse about these methods. Relaxing the mind can help with how the body feels pain. People can learn to quiet their pain or make it less bothersome. It can also help to make changes to improve your sleep habits.   Stay as active as possible - Walking, swimming, anju chi (a kind of martial art), or biking can all help ease muscle and joint pain. Even gentle forms of exercise are good for your health. If you are not active, your pain might get worse.  If you haven't been active for a while, start slowly. Make small increases in the intensity and amount of time you spend exercising. If exercising makes your pain worse, talk with your doctor. They might recommend a program that can help you get more active.  What medical care will I need? -- Many people need a team to help manage their care. A treatment team usually includes:   Doctors or specialists   A physical therapist   Someone trained in mental health, like a  or counselor  Your team will probably want to see you regularly:   They will ask you questions about your pain and other symptoms. They will also work with you to learn how treatment is working and make changes if needed.   They will ask you about your mood and your mental health. Depression and chronic pain can make each other worse. If you have depression, they might suggest treatment for it.  As you get better at managing your pain, you might see your team less frequently.  All topics are updated as new evidence becomes available and our peer review process is complete.  This topic retrieved from [employer] on: Mar 29, 2024.  Topic 55919 Version 18.0  Release: 32.2.4 - C32.87  © 2024 UpToDate, Inc. and/or its affiliates. All rights reserved.  Consumer Information Use and Disclaimer   Disclaimer: This generalized information is a limited summary of diagnosis,  treatment, and/or medication information. It is not meant to be comprehensive and should be used as a tool to help the user understand and/or assess potential diagnostic and treatment options. It does NOT include all information about conditions, treatments, medications, side effects, or risks that may apply to a specific patient. It is not intended to be medical advice or a substitute for the medical advice, diagnosis, or treatment of a health care provider based on the health care provider's examination and assessment of a patient's specific and unique circumstances. Patients must speak with a health care provider for complete information about their health, medical questions, and treatment options, including any risks or benefits regarding use of medications. This information does not endorse any treatments or medications as safe, effective, or approved for treating a specific patient. UpToDate, Inc. and its affiliates disclaim any warranty or liability relating to this information or the use thereof.The use of this information is governed by the Terms of Use, available at https://www.woltersVenaxisuwer.com/en/know/clinical-effectiveness-terms. 2024© UpToDate, Inc. and its affiliates and/or licensors. All rights reserved.  Copyright   © 2024 Up[employer]Date, Inc. and/or its affiliates. All rights reserved.

## 2024-11-20 NOTE — ASSESSMENT & PLAN NOTE
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

## 2024-11-20 NOTE — PROGRESS NOTES
Dr. Bradford's Office Visit Note  24     Ifeanyi Puga 58 y.o. male MRN: [id number]  : 1965    Assessment:     1. 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  2. Lumbar disc herniation  Assessment & Plan:  Percocet 10-3 25 4 times a day was refilled regimen helping patient control the pain function carry out day-to-day activities and work full-time been evaluated by pain management spine recommended surgery underwent epidural injection no improvement also underwent physical therapy no improvement remaining follow-up on finding as follows from a previous progress note Percocet 10-3 25 4 times a day was refilled      Percocet 10/325 4 times a day was refilled  All medical records reviewed and reconciled patient PDMP database reviewed to ensure compliant with the treatment plan for pain management benefits due to the fact that patient has not responded to other measures for pain control so far and severe the pain is significant and interfering with normal daily activities I believe it is reasonable and appropriate to continue the controlled substance in order to improve his ability to function in enhance quality of life the patient has signed a narcotic agreement patient should it was utilize 1 pharmacy and not receiving narcotic from any other provider patient verbalizes understanding that breaching the contract will affect future prescription decision making and critical results in the dismissal from the practice if specifically the  has been explained in understood to patient patient demonstrates following informed use of appropriate medicine to analgesia increase activity explained the side effects recommended that thisto the patient patient understands that also advised that appeared to expose him order an abuse and addiction and overdose counseling provided for prevention of any overuse abuse or addiction  Orders:  -     oxyCODONE-acetaminophen (PERCOCET)  mg per tablet; Take 1 tablet by mouth every 8 (eight) hours as needed for severe pain Max Daily Amount: 3 tablets  3. Primary osteoarthritis of both knees  4. Lumbar foraminal stenosis  Assessment & Plan:  Same as under lumbar disc herniation Percocet was refilled  Orders:  -     oxyCODONE-acetaminophen (PERCOCET)  mg per tablet; Take 1 tablet by mouth every 8 (eight) hours as needed for severe pain Max Daily Amount: 3 tablets  5. Lumbar spondylosis  6. Lumbar spondylosis with myelopathy  Assessment & Plan:  Complains of intractable lower back pain radiating both lower extremity tingling numbness with intermittent weakness causing difficulty ambulating on Percocet 10-3 25 4 times a day was refilled further management same as under lumbar spine stenosis and lumbar disc herniation  Orders:  -     oxyCODONE-acetaminophen (PERCOCET)  mg per tablet; Take 1 tablet by mouth every 8 (eight) hours as needed for severe pain Max Daily Amount: 3 tablets        Discussion Summary and Plan:  Today's care plan and medications were reviewed with patient in detail and all their questions answered to their satisfaction.    Chief Complaint   Patient presents with    Follow-up      Subjective:  Came in follow-up chronic medical condition now has a persistent dry cough and history of asthma allergic rhinitis in the past no fever chills no wheezing no difficulty breathing and management and a refill for Percocet 10-3 25 4 times a day was refilled for management of chronic intractable pain for  details refer to assessment plan visit diagnosis        The following portions of the patient's history were reviewed and updated as appropriate: allergies, current medications, past family history, past medical history, past social history, past surgical history and problem list.    Review of Systems   Constitutional:  Positive for activity change. Negative for appetite change, chills, diaphoresis, fatigue, fever and unexpected weight change.   HENT:  Negative for congestion, dental problem, drooling, ear discharge, ear pain, facial swelling, hearing loss, mouth sores, nosebleeds, postnasal drip, rhinorrhea, sinus pressure, sneezing, sore throat, tinnitus, trouble swallowing and voice change.    Eyes:  Negative for photophobia, pain, discharge, redness, itching and visual disturbance.   Respiratory:  Negative for apnea, cough, choking, chest tightness, shortness of breath, wheezing and stridor.    Cardiovascular:  Negative for chest pain, palpitations and leg swelling.   Gastrointestinal:  Negative for abdominal distention, abdominal pain, anal bleeding, blood in stool, constipation, diarrhea, nausea, rectal pain and vomiting.   Endocrine: Negative for cold intolerance, heat intolerance, polydipsia, polyphagia and polyuria.   Genitourinary:  Negative for decreased urine volume, difficulty urinating, dysuria, enuresis, flank pain, frequency, genital sores, hematuria and urgency.   Musculoskeletal:  Positive for arthralgias, back pain, gait problem, joint swelling and myalgias. Negative for neck stiffness.   Skin:  Negative for color change, pallor, rash and wound.   Allergic/Immunologic: Negative.  Negative for environmental allergies, food allergies and immunocompromised state.   Neurological:  Negative for dizziness, tremors, seizures, syncope, facial asymmetry, speech difficulty, weakness, light-headedness, numbness and headaches.   Psychiatric/Behavioral:  Negative for agitation, behavioral problems, confusion,  decreased concentration, dysphoric mood, hallucinations, self-injury, sleep disturbance and suicidal ideas. The patient is not nervous/anxious and is not hyperactive.          Historical Information   Patient Active Problem List   Diagnosis    Benign essential hypertension    Benign prostatic hyperplasia    DDD (degenerative disc disease), lumbar    Incomplete emptying of bladder    Lumbar disc herniation    Lumbar foraminal stenosis    Neuropathy    Chronic intractable pain    Mild episode of recurrent major depressive disorder (HCC)    Hematospermia    Prostatitis    Other male erectile dysfunction    Annual physical exam    Primary osteoarthritis of both knees    Chronic cough    Non-seasonal allergic rhinitis due to pollen    Continuous opioid dependence (HCC)    Pain    Swelling    Intermittent claudication due to atherosclerosis of artery of extremity (HCC)    Lumbar spondylosis    Gastric bypass status for obesity    Morbid (severe) obesity due to excess calories (HCC)    Uncomplicated opioid dependence (HCC)    Allergic reaction    Prediabetes    Mixed hyperlipidemia    Restless leg    Mild intermittent asthma without complication    Lumbar spondylosis with myelopathy     Past Medical History:   Diagnosis Date    Abdominal pain     Anxiety disorder     Arthritis     Back pain     Dizziness     Headache     Hypertension     Lightheadedness     Obesity     Palpitations     SOB (shortness of breath)      Past Surgical History:   Procedure Laterality Date    CHOLECYSTECTOMY      GASTRIC BYPASS       Social History     Substance and Sexual Activity   Alcohol Use None    Comment: none per [place]     Social History     Substance and Sexual Activity   Drug Use Not on file    Comment: none per Trish     Social History     Tobacco Use   Smoking Status Never   Smokeless Tobacco Never     History reviewed. No pertinent family history.  Health Maintenance Due   Topic    Hepatitis C Screening     HIV Screening     Zoster  "Vaccine (1 of 2)    Annual Physical     COVID-19 Vaccine (7 - 2024-25 season)      Meds/Allergies       Current Outpatient Medications:     amlodipine-olmesartan (Soni) 5-40 MG, Take 1 tablet by mouth daily, Disp: 90 tablet, Rfl: 2    celecoxib (CeleBREX) 200 mg capsule, Take 1 capsule (200 mg total) by mouth 2 (two) times a day, Disp: 180 capsule, Rfl: 1    cetirizine (ZyrTEC) 10 mg tablet, Take 1 tablet (10 mg total) by mouth daily, Disp: 30 tablet, Rfl: 2    escitalopram (LEXAPRO) 20 mg tablet, Take 1 tablet (20 mg total) by mouth daily, Disp: 90 tablet, Rfl: 3    fluticasone (FLONASE) 50 mcg/act nasal spray, 1 spray into each nostril 2 (two) times a day, Disp: 18.2 mL, Rfl: 2    fluticasone-vilanterol 200-25 mcg/actuation inhaler, Inhale 1 puff daily Rinse mouth after use., Disp: 60 blister, Rfl: 5    oxyCODONE-acetaminophen (PERCOCET)  mg per tablet, Take 1 tablet by mouth every 8 (eight) hours as needed for severe pain Max Daily Amount: 3 tablets, Disp: 90 tablet, Rfl: 0    pramipexole (MIRAPEX) 0.5 mg tablet, Take 2 tablets (1 mg total) by mouth daily at bedtime, Disp: 90 tablet, Rfl: 3    pregabalin (LYRICA) 100 mg capsule, Take 1 capsule (100 mg total) by mouth 2 (two) times a day, Disp: 180 capsule, Rfl: 2    sildenafil (Viagra) 100 mg tablet, Take 1 tablet (100 mg total) by mouth daily as needed for erectile dysfunction, Disp: 10 tablet, Rfl: 55    tamsulosin (FLOMAX) 0.4 mg, Take 1 capsule (0.4 mg total) by mouth daily with dinner, Disp: 90 capsule, Rfl: 2    triamterene-hydrochlorothiazide (MAXZIDE-25) 37.5-25 mg per tablet, Take 1 tablet by mouth daily, Disp: 90 tablet, Rfl: 1      Objective:    Vitals:   /86   Pulse 84   Ht 5' 11\" (1.803 m)   Wt 103 kg (226 lb)   SpO2 99%   BMI 31.52 kg/m²   Body mass index is 31.52 kg/m².  Vitals:    11/20/24 1708   Weight: 103 kg (226 lb)       Physical Exam  Vitals and nursing note reviewed.   Constitutional:       General: He is not in acute " distress.     Appearance: He is well-developed. He is not ill-appearing, toxic-appearing or diaphoretic.   HENT:      Head: Normocephalic and atraumatic.      Right Ear: External ear normal.      Left Ear: External ear normal.      Nose: Nose normal.      Mouth/Throat:      Pharynx: No oropharyngeal exudate.   Eyes:      General: Lids are normal. Lids are everted, no foreign bodies appreciated. No scleral icterus.        Right eye: No discharge.         Left eye: No discharge.      Conjunctiva/sclera: Conjunctivae normal.      Pupils: Pupils are equal, round, and reactive to light.   Neck:      Thyroid: No thyromegaly.      Vascular: Normal carotid pulses. No carotid bruit, hepatojugular reflux or JVD.      Trachea: No tracheal tenderness or tracheal deviation.   Cardiovascular:      Rate and Rhythm: Normal rate and regular rhythm.      Pulses: Normal pulses.      Heart sounds: Normal heart sounds. No murmur heard.     No friction rub. No gallop.   Pulmonary:      Effort: Pulmonary effort is normal. No respiratory distress.      Breath sounds: Normal breath sounds. No stridor. No wheezing or rales.   Chest:      Chest wall: No tenderness.   Abdominal:      General: Bowel sounds are normal. There is no distension.      Palpations: Abdomen is soft. There is no mass.      Tenderness: There is no abdominal tenderness. There is no guarding or rebound.   Musculoskeletal:         General: No tenderness or deformity. Normal range of motion.      Cervical back: Normal range of motion and neck supple. No edema, erythema or rigidity. No spinous process tenderness or muscular tenderness. Normal range of motion.   Lymphadenopathy:      Head:      Right side of head: No submental, submandibular, tonsillar, preauricular or posterior auricular adenopathy.      Left side of head: No submental, submandibular, tonsillar, preauricular, posterior auricular or occipital adenopathy.      Cervical: No cervical adenopathy.      Right  "cervical: No superficial, deep or posterior cervical adenopathy.     Left cervical: No superficial, deep or posterior cervical adenopathy.      Upper Body:      Right upper body: No pectoral adenopathy.      Left upper body: No pectoral adenopathy.   Skin:     General: Skin is warm and dry.      Coloration: Skin is not pale.      Findings: No erythema or rash.   Neurological:      General: No focal deficit present.      Mental Status: He is alert and oriented to person, place, and time.      Cranial Nerves: No cranial nerve deficit.      Sensory: No sensory deficit.      Motor: No tremor, abnormal muscle tone or seizure activity.      Coordination: Coordination normal.      Gait: Gait abnormal.      Deep Tendon Reflexes: Reflexes are normal and symmetric. Reflexes normal.   Psychiatric:         Behavior: Behavior normal.         Thought Content: Thought content normal.         Judgment: Judgment normal.         Lab Review   No visits with results within 2 Month(s) from this visit.   Latest known visit with results is:   Orders Only on 05/18/2024   Component Date Value Ref Range Status    Creatinine(Crt),U 05/18/2024 76.8  50.0 - 200.0 mg/dL Final    Albumin,U,Random 05/18/2024 <0.7  <3.0 mg/dL Final    Microalb/Creat Ratio 05/18/2024 Unable to perform calculation.  <30.0 mg/gm CREA Final         There are no Patient Instructions on file for this visit.     Wilmar Bradford MD        \"This note has been constructed using a voice recognition system.Therefore there may be syntax, spelling, and/or grammatical errors. Please call if you have any questions. \"  "

## 2024-11-20 NOTE — ASSESSMENT & PLAN NOTE
Percocet 10-3 25 4 times a day was refilled regimen helping patient control the pain function carry out day-to-day activities and work full-time been evaluated by pain management spine recommended surgery underwent epidural injection no improvement also underwent physical therapy no improvement remaining follow-up on finding as follows from a previous progress note Percocet 10-3 25 4 times a day was refilled      Percocet 10/325 4 times a day was refilled  All medical records reviewed and reconciled patient PDMP database reviewed to ensure compliant with the treatment plan for pain management benefits due to the fact that patient has not responded to other measures for pain control so far and severe the pain is significant and interfering with normal daily activities I believe it is reasonable and appropriate to continue the controlled substance in order to improve his ability to function in enhance quality of life the patient has signed a narcotic agreement patient should it was utilize 1 pharmacy and not receiving narcotic from any other provider patient verbalizes understanding that breaching the contract will affect future prescription decision making and critical results in the dismissal from the practice if specifically the has been explained in understood to patient patient demonstrates following informed use of appropriate medicine to analgesia increase activity explained the side effects recommended that thisto the patient patient understands that also advised that appeared to expose him order an abuse and addiction and overdose counseling provided for prevention of any overuse abuse or addiction

## 2024-11-20 NOTE — ASSESSMENT & PLAN NOTE
Complains of intractable lower back pain radiating both lower extremity tingling numbness with intermittent weakness causing difficulty ambulating on Percocet 10-3 25 4 times a day was refilled further management same as under lumbar spine stenosis and lumbar disc herniation

## 2024-12-18 ENCOUNTER — OFFICE VISIT (OUTPATIENT)
Dept: INTERNAL MEDICINE CLINIC | Facility: CLINIC | Age: 59
End: 2024-12-18
Payer: COMMERCIAL

## 2024-12-18 VITALS
HEIGHT: 71 IN | WEIGHT: 228 LBS | OXYGEN SATURATION: 99 % | DIASTOLIC BLOOD PRESSURE: 80 MMHG | BODY MASS INDEX: 31.92 KG/M2 | SYSTOLIC BLOOD PRESSURE: 122 MMHG | HEART RATE: 80 BPM

## 2024-12-18 DIAGNOSIS — F52.4 PREMATURE EJACULATION: ICD-10-CM

## 2024-12-18 DIAGNOSIS — M51.26 LUMBAR DISC HERNIATION: Primary | ICD-10-CM

## 2024-12-18 DIAGNOSIS — M48.061 LUMBAR FORAMINAL STENOSIS: ICD-10-CM

## 2024-12-18 DIAGNOSIS — M47.16 LUMBAR SPONDYLOSIS WITH MYELOPATHY: ICD-10-CM

## 2024-12-18 PROCEDURE — 99213 OFFICE O/P EST LOW 20 MIN: CPT | Performed by: INTERNAL MEDICINE

## 2024-12-18 RX ORDER — OXYCODONE AND ACETAMINOPHEN 10; 325 MG/1; MG/1
1 TABLET ORAL EVERY 8 HOURS PRN
Qty: 90 TABLET | Refills: 0 | Status: SHIPPED | OUTPATIENT
Start: 2024-12-18

## 2024-12-18 RX ORDER — LIDOCAINE/PRILOCAINE 2.5 %-2.5%
CREAM (GRAM) TOPICAL AS NEEDED
Qty: 18 G | Refills: 1 | Status: SHIPPED | OUTPATIENT
Start: 2024-12-18

## 2024-12-18 NOTE — PROGRESS NOTES
Dr. Bradford's Office Visit Note  24     Ifeanyi Puga 59 y.o. male MRN: [id number]  : 1965    Assessment:     1. Lumbar disc herniation  Assessment & Plan:  P Percocet 10-3 25 4 times a day was refilled helping patient function carry out day-to-day activities and work full-time patient's work does involve physical weight lifting and extensive walking remaining follow-up plan finding as follows from a previous progress note      All medical records reviewed and reconciled patient PDMP database reviewed to ensure compliant with the treatment plan for pain management benefits due to the fact that patient has not responded to other measures for pain control so far and severe the pain is significant and interfering with normal daily activities I believe it is reasonable and appropriate to continue the controlled substance in order to improve his ability to function in enhance quality of life the patient has signed a narcotic agreement patient should it was utilize 1 pharmacy and not receiving narcotic from any other provider patient verbalizes understanding that breaching the contract will affect future prescription decision making and critical results in the dismissal from the practice if specifically the has been explained in understood to patient patient demonstrates following informed use of appropriate medicine to analgesia increase activity explained the side effects recommended that thisto the patient patient understands that also advised that appeared to expose him order an abuse and addiction and overdose counseling provided for prevention of any overuse abuse or addiction  Orders:  -     oxyCODONE-acetaminophen (PERCOCET)  mg per tablet; Take 1 tablet by mouth every 8 (eight) hours as needed for severe pain Max Daily Amount: 3 tablets  -     lidocaine-prilocaine (EMLA) cream; Apply topically as needed for mild pain  2. Lumbar foraminal stenosis  -     oxyCODONE-acetaminophen (PERCOCET)  mg  per tablet; Take 1 tablet by mouth every 8 (eight) hours as needed for severe pain Max Daily Amount: 3 tablets  -     lidocaine-prilocaine (EMLA) cream; Apply topically as needed for mild pain  3. Lumbar spondylosis with myelopathy  Assessment & Plan:  Same is under lumbar disc herniationComplains of intractable lower back pain radiating both lower extremity tingling numbness with intermittent weakness causing difficulty ambulating on Percocet 10-3 25 4 times a day was refilled further management same as under lumbar spine stenosis and lumbar disc herniation  Orders:  -     oxyCODONE-acetaminophen (PERCOCET)  mg per tablet; Take 1 tablet by mouth every 8 (eight) hours as needed for severe pain Max Daily Amount: 3 tablets  -     lidocaine-prilocaine (EMLA) cream; Apply topically as needed for mild pain  4. Premature ejaculation  Assessment & Plan:  Seen by urology was prescribed Elimite cream lidocaine 2.5% with with Xylocaine 2.5% to use as directed  Orders:  -     lidocaine-prilocaine (EMLA) cream; Apply topically as needed for mild pain        Discussion Summary and Plan:  Today's care plan and medications were reviewed with patient in detail and all their questions answered to their satisfaction.    Chief Complaint   Patient presents with   • Follow-up      Subjective:  Came in follow-up chronic medical condition listed visit diagnosis and refill for Percocet 10-3 25 4 times a day was refilled for details refer to assessment plan visit diagnosis        The following portions of the patient's history were reviewed and updated as appropriate: allergies, current medications, past family history, past medical history, past social history, past surgical history and problem list.    Review of Systems   Constitutional:  Positive for activity change. Negative for appetite change, chills, diaphoresis, fatigue, fever and unexpected weight change.   HENT:  Negative for congestion, dental problem, drooling, ear  discharge, ear pain, facial swelling, hearing loss, mouth sores, nosebleeds, postnasal drip, rhinorrhea, sinus pressure, sneezing, sore throat, tinnitus, trouble swallowing and voice change.    Eyes:  Negative for photophobia, pain, discharge, redness, itching and visual disturbance.   Respiratory:  Negative for apnea, cough, choking, chest tightness, shortness of breath, wheezing and stridor.    Cardiovascular:  Negative for chest pain, palpitations and leg swelling.   Gastrointestinal:  Negative for abdominal distention, abdominal pain, anal bleeding, blood in stool, constipation, diarrhea, nausea, rectal pain and vomiting.   Endocrine: Negative for cold intolerance, heat intolerance, polydipsia, polyphagia and polyuria.   Genitourinary:  Negative for decreased urine volume, difficulty urinating, dysuria, enuresis, flank pain, frequency, genital sores, hematuria and urgency.   Musculoskeletal:  Positive for arthralgias, back pain, gait problem, joint swelling, myalgias and neck pain. Negative for neck stiffness.   Skin:  Negative for color change, pallor, rash and wound.   Allergic/Immunologic: Negative.  Negative for environmental allergies, food allergies and immunocompromised state.   Neurological:  Negative for dizziness, tremors, seizures, syncope, facial asymmetry, speech difficulty, weakness, light-headedness, numbness and headaches.   Psychiatric/Behavioral:  Negative for agitation, behavioral problems, confusion, decreased concentration, dysphoric mood, hallucinations, self-injury, sleep disturbance and suicidal ideas. The patient is not nervous/anxious and is not hyperactive.          Historical Information   Patient Active Problem List   Diagnosis   • Benign essential hypertension   • Benign prostatic hyperplasia   • DDD (degenerative disc disease), lumbar   • Incomplete emptying of bladder   • Lumbar disc herniation   • Lumbar foraminal stenosis   • Neuropathy   • Chronic intractable pain   • Mild  episode of recurrent major depressive disorder (HCC)   • Hematospermia   • Prostatitis   • Other male erectile dysfunction   • Annual physical exam   • Primary osteoarthritis of both knees   • Chronic cough   • Non-seasonal allergic rhinitis due to pollen   • Continuous opioid dependence (HCC)   • Pain   • Swelling   • Intermittent claudication due to atherosclerosis of artery of extremity (HCC)   • Lumbar spondylosis   • Gastric bypass status for obesity   • Morbid (severe) obesity due to excess calories (HCC)   • Uncomplicated opioid dependence (HCC)   • Allergic reaction   • Prediabetes   • Mixed hyperlipidemia   • Restless leg   • Mild intermittent asthma without complication   • Lumbar spondylosis with myelopathy   • Premature ejaculation     Past Medical History:   Diagnosis Date   • Abdominal pain    • Anxiety disorder    • Arthritis    • Back pain    • Dizziness    • Headache    • Hypertension    • Lightheadedness    • Obesity    • Palpitations    • SOB (shortness of breath)      Past Surgical History:   Procedure Laterality Date   • CHOLECYSTECTOMY     • GASTRIC BYPASS       Social History     Substance and Sexual Activity   Alcohol Use None    Comment: none per [place]     Social History     Substance and Sexual Activity   Drug Use Not on file    Comment: none per [place]     Social History     Tobacco Use   Smoking Status Never   Smokeless Tobacco Never     History reviewed. No pertinent family history.  Health Maintenance Due   Topic   • Hepatitis C Screening    • Pneumococcal Vaccine: Pediatrics (0 to 5 Years) and At-Risk Patients (6 to 64 Years) (1 of 2 - PCV)   • HIV Screening    • Zoster Vaccine (1 of 2)   • Annual Physical    • COVID-19 Vaccine (7 - 2024-25 season)      Meds/Allergies       Current Outpatient Medications:   •  amlodipine-olmesartan (Soni) 5-40 MG, Take 1 tablet by mouth daily, Disp: 90 tablet, Rfl: 2  •  celecoxib (CeleBREX) 200 mg capsule, Take 1 capsule (200 mg total) by mouth 2  "(two) times a day, Disp: 180 capsule, Rfl: 1  •  cetirizine (ZyrTEC) 10 mg tablet, Take 1 tablet (10 mg total) by mouth daily, Disp: 30 tablet, Rfl: 2  •  escitalopram (LEXAPRO) 20 mg tablet, Take 1 tablet (20 mg total) by mouth daily, Disp: 90 tablet, Rfl: 3  •  fluticasone (FLONASE) 50 mcg/act nasal spray, 1 spray into each nostril 2 (two) times a day, Disp: 18.2 mL, Rfl: 2  •  fluticasone-vilanterol 200-25 mcg/actuation inhaler, Inhale 1 puff daily Rinse mouth after use., Disp: 60 blister, Rfl: 5  •  lidocaine-prilocaine (EMLA) cream, Apply topically as needed for mild pain, Disp: 18 g, Rfl: 1  •  oxyCODONE-acetaminophen (PERCOCET)  mg per tablet, Take 1 tablet by mouth every 8 (eight) hours as needed for severe pain Max Daily Amount: 3 tablets, Disp: 90 tablet, Rfl: 0  •  pramipexole (MIRAPEX) 0.5 mg tablet, Take 2 tablets (1 mg total) by mouth daily at bedtime, Disp: 90 tablet, Rfl: 3  •  pregabalin (LYRICA) 100 mg capsule, Take 1 capsule (100 mg total) by mouth 2 (two) times a day, Disp: 180 capsule, Rfl: 2  •  sildenafil (Viagra) 100 mg tablet, Take 1 tablet (100 mg total) by mouth daily as needed for erectile dysfunction, Disp: 10 tablet, Rfl: 55  •  tamsulosin (FLOMAX) 0.4 mg, Take 1 capsule (0.4 mg total) by mouth daily with dinner, Disp: 90 capsule, Rfl: 2  •  triamterene-hydrochlorothiazide (MAXZIDE-25) 37.5-25 mg per tablet, Take 1 tablet by mouth daily, Disp: 90 tablet, Rfl: 1      Objective:    Vitals:   /80   Pulse 80   Ht 5' 11\" (1.803 m)   Wt 103 kg (228 lb)   SpO2 99%   BMI 31.80 kg/m²   Body mass index is 31.8 kg/m².  Vitals:    12/18/24 1338   Weight: 103 kg (228 lb)       Physical Exam  Vitals and nursing note reviewed.   Constitutional:       General: He is not in acute distress.     Appearance: He is well-developed. He is not ill-appearing, toxic-appearing or diaphoretic.   HENT:      Head: Normocephalic and atraumatic.      Right Ear: External ear normal.      Left Ear: " External ear normal.      Nose: Nose normal.      Mouth/Throat:      Pharynx: No oropharyngeal exudate.   Eyes:      General: Lids are normal. Lids are everted, no foreign bodies appreciated. No scleral icterus.        Right eye: No discharge.         Left eye: No discharge.      Conjunctiva/sclera: Conjunctivae normal.      Pupils: Pupils are equal, round, and reactive to light.   Neck:      Thyroid: No thyromegaly.      Vascular: Normal carotid pulses. No carotid bruit, hepatojugular reflux or JVD.      Trachea: No tracheal tenderness or tracheal deviation.   Cardiovascular:      Rate and Rhythm: Normal rate and regular rhythm.      Pulses: Normal pulses.      Heart sounds: Normal heart sounds. No murmur heard.     No friction rub. No gallop.   Pulmonary:      Effort: Pulmonary effort is normal. No respiratory distress.      Breath sounds: Normal breath sounds. No stridor. No wheezing or rales.   Chest:      Chest wall: No tenderness.   Abdominal:      General: Bowel sounds are normal. There is no distension.      Palpations: Abdomen is soft. There is no mass.      Tenderness: There is no abdominal tenderness. There is no guarding or rebound.   Musculoskeletal:         General: No tenderness or deformity. Normal range of motion.      Cervical back: Normal range of motion and neck supple. No edema, erythema or rigidity. No spinous process tenderness or muscular tenderness. Normal range of motion.   Lymphadenopathy:      Head:      Right side of head: No submental, submandibular, tonsillar, preauricular or posterior auricular adenopathy.      Left side of head: No submental, submandibular, tonsillar, preauricular, posterior auricular or occipital adenopathy.      Cervical: No cervical adenopathy.      Right cervical: No superficial, deep or posterior cervical adenopathy.     Left cervical: No superficial, deep or posterior cervical adenopathy.      Upper Body:      Right upper body: No pectoral adenopathy.      Left  upper body: No pectoral adenopathy.   Skin:     General: Skin is warm and dry.      Coloration: Skin is not pale.      Findings: No erythema or rash.   Neurological:      General: No focal deficit present.      Mental Status: He is alert and oriented to person, place, and time.      Cranial Nerves: No cranial nerve deficit.      Sensory: No sensory deficit.      Motor: No tremor, abnormal muscle tone or seizure activity.      Coordination: Coordination normal.      Gait: Gait abnormal.      Deep Tendon Reflexes: Reflexes are normal and symmetric. Reflexes normal.   Psychiatric:         Behavior: Behavior normal.         Thought Content: Thought content normal.         Judgment: Judgment normal.         Lab Review   No visits with results within 2 Month(s) from this visit.   Latest known visit with results is:   Orders Only on 05/18/2024   Component Date Value Ref Range Status   • Creatinine(Crt),U 05/18/2024 76.8  50.0 - 200.0 mg/dL Final   • Albumin,U,Random 05/18/2024 <0.7  <3.0 mg/dL Final   • Microalb/Creat Ratio 05/18/2024 Unable to perform calculation.  <30.0 mg/gm CREA Final         Patient Instructions   Patient Education     Chronic pain   The Basics   Written by the doctors and editors at [facility]   What is chronic pain? -- This is pain that lasts longer than 3 to 6 months. In many cases, this means that pain continues even after the injury or condition that first caused it has healed.  Pain can affect the body in different ways. For example, pain can be:   An ache deep inside the muscle or bone   Stabbing or shooting, often with tingling or numbness   Dull and throbbing  People who have chronic pain might have a hard time doing their usual activities, such as bathing or dressing. This can lead to depression and anxiety. It can also cause problems with sleep.  What causes chronic pain? -- It is not always clear. Sometimes, it is caused by an ongoing medical problem, such as arthritis or nerve damage from  "diabetes. But doctors cannot always find the cause of chronic pain.  In some cases, people with chronic pain must accept that their pain will never be explained. This means that they have to work with their health care team to address the pain, even if they don't know its cause.  Will I need tests? -- Your doctor might do tests to figure out the cause of your pain. For example, you might get:   Blood tests - These can check for infection, signs of inflammation, or diseases that can cause pain.   X-rays or other imaging tests - Imaging tests create pictures of the inside of the body. They can check for bone fractures, joint damage, cancer, or other changes in your body that could cause pain.   Nerve tests - These are ways to check whether the nerves are working normally.  However, tests cannot always show the cause of pain. Scientists think that in some people, the pain signals in the brain stop working normally. The signals get \"stuck\" in the on position, even when the source of pain is gone.  How is chronic pain treated? -- There are many different ways to manage chronic pain. Most people need to try different combinations. The right approach for you depends on your pain as well as your lifestyle and preferences.  Treatment options include:   Non-medicine techniques - Doctors recommend first trying to manage pain without medicines. This can involve lots of different things, such as:   Working with a counselor or therapist   Physical therapy or an exercise program   Lifestyle and behavior changes to improve your mood, sleep, diet, and stress level   Acupuncture   Massage   Ice or heat   Devices that affect nerve signals   Medicines - When the above methods are not enough to relieve pain, doctors can recommend medicines. Different medicines work in different ways. Some pain medicines, like opioids, are not recommended for treating chronic pain in most cases. That's because they come with serious risks. Doctors " usually try other medicines first.  If your doctor suggests a medicine that seems strange, keep an open mind. Sometimes, doctors treat pain with medicines made to treat other medical problems. For example, medicines for depression can help with pain, because they work on areas of the brain that process pain. Doctors can also use medicines for seizures to treat pain, because they help with overactive nerves.   Other treatments - These might include:   Injections (shots) of numbing or pain-relieving medicines   Surgery  To find the best treatment for you:   Be open to trying new treatments and combinations of treatments. Sometimes, you have to try a few different options before you find one that works best.   Set realistic goals for your treatment. Even if you can't completely get rid of your pain, you might be able to control it enough so you can do the activities you like.  Is there anything I can do on my own to feel better? -- Yes. It can help to:   Try things like heat, cold, or massage - Depending on the cause of your pain, these things might help. Check with your doctor to make sure that it is OK for your condition.   Practice relaxing - You can learn methods to relax your body, such as doing deep breathing exercises. Ask your doctor or nurse about these methods. Relaxing the mind can help with how the body feels pain. People can learn to quiet their pain or make it less bothersome. It can also help to make changes to improve your sleep habits.   Stay as active as possible - Walking, swimming, anju chi (a kind of martial art), or biking can all help ease muscle and joint pain. Even gentle forms of exercise are good for your health. If you are not active, your pain might get worse.  If you haven't been active for a while, start slowly. Make small increases in the intensity and amount of time you spend exercising. If exercising makes your pain worse, talk with your doctor. They might recommend a program that can  help you get more active.  What medical care will I need? -- Many people need a team to help manage their care. A treatment team usually includes:   Doctors or specialists   A physical therapist   Someone trained in mental health, like a  or counselor  Your team will probably want to see you regularly:   They will ask you questions about your pain and other symptoms. They will also work with you to learn how treatment is working and make changes if needed.   They will ask you about your mood and your mental health. Depression and chronic pain can make each other worse. If you have depression, they might suggest treatment for it.  As you get better at managing your pain, you might see your team less frequently.  All topics are updated as new evidence becomes available and our peer review process is complete.  This topic retrieved from [employer] on: Mar 29, 2024.  Topic 14412 Version 18.0  Release: 32.2.4 - C32.87  © 2024 Up[employer]Date, Inc. and/or its affiliates. All rights reserved.  Consumer Information Use and Disclaimer   Disclaimer: This generalized information is a limited summary of diagnosis, treatment, and/or medication information. It is not meant to be comprehensive and should be used as a tool to help the user understand and/or assess potential diagnostic and treatment options. It does NOT include all information about conditions, treatments, medications, side effects, or risks that may apply to a specific patient. It is not intended to be medical advice or a substitute for the medical advice, diagnosis, or treatment of a health care provider based on the health care provider's examination and assessment of a patient's specific and unique circumstances. Patients must speak with a health care provider for complete information about their health, medical questions, and treatment options, including any risks or benefits regarding use of medications. This information does not endorse any treatments or  "medications as safe, effective, or approved for treating a specific patient. UpToDate, Inc. and its affiliates disclaim any warranty or liability relating to this information or the use thereof.The use of this information is governed by the Terms of Use, available at https://www.[employer].com/en/know/clinical-effectiveness-terms. 2024© UpToDate, Inc. and its affiliates and/or licensors. All rights reserved.  Copyright   © 2024 Up[employer]Date, Inc. and/or its affiliates. All rights reserved.       Wilmar Bradford MD        \"This note has been constructed using a voice recognition system.Therefore there may be syntax, spelling, and/or grammatical errors. Please call if you have any questions. \"  "

## 2024-12-18 NOTE — PATIENT INSTRUCTIONS
"Patient Education     Chronic pain   The Basics   Written by the doctors and editors at [facility]   What is chronic pain? -- This is pain that lasts longer than 3 to 6 months. In many cases, this means that pain continues even after the injury or condition that first caused it has healed.  Pain can affect the body in different ways. For example, pain can be:   An ache deep inside the muscle or bone   Stabbing or shooting, often with tingling or numbness   Dull and throbbing  People who have chronic pain might have a hard time doing their usual activities, such as bathing or dressing. This can lead to depression and anxiety. It can also cause problems with sleep.  What causes chronic pain? -- It is not always clear. Sometimes, it is caused by an ongoing medical problem, such as arthritis or nerve damage from diabetes. But doctors cannot always find the cause of chronic pain.  In some cases, people with chronic pain must accept that their pain will never be explained. This means that they have to work with their health care team to address the pain, even if they don't know its cause.  Will I need tests? -- Your doctor might do tests to figure out the cause of your pain. For example, you might get:   Blood tests - These can check for infection, signs of inflammation, or diseases that can cause pain.   X-rays or other imaging tests - Imaging tests create pictures of the inside of the body. They can check for bone fractures, joint damage, cancer, or other changes in your body that could cause pain.   Nerve tests - These are ways to check whether the nerves are working normally.  However, tests cannot always show the cause of pain. Scientists think that in some people, the pain signals in the brain stop working normally. The signals get \"stuck\" in the on position, even when the source of pain is gone.  How is chronic pain treated? -- There are many different ways to manage chronic pain. Most people need to try different " combinations. The right approach for you depends on your pain as well as your lifestyle and preferences.  Treatment options include:   Non-medicine techniques - Doctors recommend first trying to manage pain without medicines. This can involve lots of different things, such as:   Working with a counselor or therapist   Physical therapy or an exercise program   Lifestyle and behavior changes to improve your mood, sleep, diet, and stress level   Acupuncture   Massage   Ice or heat   Devices that affect nerve signals   Medicines - When the above methods are not enough to relieve pain, doctors can recommend medicines. Different medicines work in different ways. Some pain medicines, like opioids, are not recommended for treating chronic pain in most cases. That's because they come with serious risks. Doctors usually try other medicines first.  If your doctor suggests a medicine that seems strange, keep an open mind. Sometimes, doctors treat pain with medicines made to treat other medical problems. For example, medicines for depression can help with pain, because they work on areas of the brain that process pain. Doctors can also use medicines for seizures to treat pain, because they help with overactive nerves.   Other treatments - These might include:   Injections (shots) of numbing or pain-relieving medicines   Surgery  To find the best treatment for you:   Be open to trying new treatments and combinations of treatments. Sometimes, you have to try a few different options before you find one that works best.   Set realistic goals for your treatment. Even if you can't completely get rid of your pain, you might be able to control it enough so you can do the activities you like.  Is there anything I can do on my own to feel better? -- Yes. It can help to:   Try things like heat, cold, or massage - Depending on the cause of your pain, these things might help. Check with your doctor to make sure that it is OK for your  condition.   Practice relaxing - You can learn methods to relax your body, such as doing deep breathing exercises. Ask your doctor or nurse about these methods. Relaxing the mind can help with how the body feels pain. People can learn to quiet their pain or make it less bothersome. It can also help to make changes to improve your sleep habits.   Stay as active as possible - Walking, swimming, anju chi (a kind of martial art), or biking can all help ease muscle and joint pain. Even gentle forms of exercise are good for your health. If you are not active, your pain might get worse.  If you haven't been active for a while, start slowly. Make small increases in the intensity and amount of time you spend exercising. If exercising makes your pain worse, talk with your doctor. They might recommend a program that can help you get more active.  What medical care will I need? -- Many people need a team to help manage their care. A treatment team usually includes:   Doctors or specialists   A physical therapist   Someone trained in mental health, like a  or counselor  Your team will probably want to see you regularly:   They will ask you questions about your pain and other symptoms. They will also work with you to learn how treatment is working and make changes if needed.   They will ask you about your mood and your mental health. Depression and chronic pain can make each other worse. If you have depression, they might suggest treatment for it.  As you get better at managing your pain, you might see your team less frequently.  All topics are updated as new evidence becomes available and our peer review process is complete.  This topic retrieved from [employer] on: Mar 29, 2024.  Topic 29066 Version 18.0  Release: 32.2.4 - C32.87  © 2024 UpToDate, Inc. and/or its affiliates. All rights reserved.  Consumer Information Use and Disclaimer   Disclaimer: This generalized information is a limited summary of diagnosis,  treatment, and/or medication information. It is not meant to be comprehensive and should be used as a tool to help the user understand and/or assess potential diagnostic and treatment options. It does NOT include all information about conditions, treatments, medications, side effects, or risks that may apply to a specific patient. It is not intended to be medical advice or a substitute for the medical advice, diagnosis, or treatment of a health care provider based on the health care provider's examination and assessment of a patient's specific and unique circumstances. Patients must speak with a health care provider for complete information about their health, medical questions, and treatment options, including any risks or benefits regarding use of medications. This information does not endorse any treatments or medications as safe, effective, or approved for treating a specific patient. UpToDate, Inc. and its affiliates disclaim any warranty or liability relating to this information or the use thereof.The use of this information is governed by the Terms of Use, available at https://www.[employer].com/en/know/clinical-effectiveness-terms. 2024© UpToDate, Inc. and its affiliates and/or licensors. All rights reserved.  Copyright   © 2024 UpToDate, Inc. and/or its affiliates. All rights reserved.

## 2025-01-15 ENCOUNTER — OFFICE VISIT (OUTPATIENT)
Dept: INTERNAL MEDICINE CLINIC | Facility: CLINIC | Age: 60
End: 2025-01-15
Payer: COMMERCIAL

## 2025-01-15 VITALS
OXYGEN SATURATION: 100 % | BODY MASS INDEX: 32.48 KG/M2 | DIASTOLIC BLOOD PRESSURE: 74 MMHG | WEIGHT: 232 LBS | TEMPERATURE: 97.8 F | HEART RATE: 82 BPM | HEIGHT: 71 IN | SYSTOLIC BLOOD PRESSURE: 147 MMHG

## 2025-01-15 DIAGNOSIS — M47.16 LUMBAR SPONDYLOSIS WITH MYELOPATHY: ICD-10-CM

## 2025-01-15 DIAGNOSIS — F11.20 CONTINUOUS OPIOID DEPENDENCE (HCC): ICD-10-CM

## 2025-01-15 DIAGNOSIS — M51.26 LUMBAR DISC HERNIATION: ICD-10-CM

## 2025-01-15 DIAGNOSIS — J45.20 MILD INTERMITTENT ASTHMA WITHOUT COMPLICATION: Primary | ICD-10-CM

## 2025-01-15 DIAGNOSIS — M48.061 LUMBAR FORAMINAL STENOSIS: ICD-10-CM

## 2025-01-15 PROCEDURE — 99213 OFFICE O/P EST LOW 20 MIN: CPT | Performed by: INTERNAL MEDICINE

## 2025-01-15 RX ORDER — OXYCODONE AND ACETAMINOPHEN 10; 325 MG/1; MG/1
1 TABLET ORAL EVERY 8 HOURS PRN
Qty: 90 TABLET | Refills: 0 | Status: SHIPPED | OUTPATIENT
Start: 2025-01-15

## 2025-01-15 NOTE — PATIENT INSTRUCTIONS
"Patient Education     Chronic pain   The Basics   Written by the doctors and editors at [facility]   What is chronic pain? -- This is pain that lasts longer than 3 to 6 months. In many cases, this means that pain continues even after the injury or condition that first caused it has healed.  Pain can affect the body in different ways. For example, pain can be:   An ache deep inside the muscle or bone   Stabbing or shooting, often with tingling or numbness   Dull and throbbing  People who have chronic pain might have a hard time doing their usual activities, such as bathing or dressing. This can lead to depression and anxiety. It can also cause problems with sleep.  What causes chronic pain? -- It is not always clear. Sometimes, it is caused by an ongoing medical problem, such as arthritis or nerve damage from diabetes. But doctors cannot always find the cause of chronic pain.  In some cases, people with chronic pain must accept that their pain will never be explained. This means that they have to work with their health care team to address the pain, even if they don't know its cause.  Will I need tests? -- Your doctor might do tests to figure out the cause of your pain. For example, you might get:   Blood tests - These can check for infection, signs of inflammation, or diseases that can cause pain.   X-rays or other imaging tests - Imaging tests create pictures of the inside of the body. They can check for bone fractures, joint damage, cancer, or other changes in your body that could cause pain.   Nerve tests - These are ways to check whether the nerves are working normally.  However, tests cannot always show the cause of pain. Scientists think that in some people, the pain signals in the brain stop working normally. The signals get \"stuck\" in the on position, even when the source of pain is gone.  How is chronic pain treated? -- There are many different ways to manage chronic pain. Most people need to try different " combinations. The right approach for you depends on your pain as well as your lifestyle and preferences.  Treatment options include:   Non-medicine techniques - Doctors recommend first trying to manage pain without medicines. This can involve lots of different things, such as:   Working with a counselor or therapist   Physical therapy or an exercise program   Lifestyle and behavior changes to improve your mood, sleep, diet, and stress level   Acupuncture   Massage   Ice or heat   Devices that affect nerve signals   Medicines - When the above methods are not enough to relieve pain, doctors can recommend medicines. Different medicines work in different ways. Some pain medicines, like opioids, are not recommended for treating chronic pain in most cases. That's because they come with serious risks. Doctors usually try other medicines first.  If your doctor suggests a medicine that seems strange, keep an open mind. Sometimes, doctors treat pain with medicines made to treat other medical problems. For example, medicines for depression can help with pain, because they work on areas of the brain that process pain. Doctors can also use medicines for seizures to treat pain, because they help with overactive nerves.   Other treatments - These might include:   Injections (shots) of numbing or pain-relieving medicines   Surgery  To find the best treatment for you:   Be open to trying new treatments and combinations of treatments. Sometimes, you have to try a few different options before you find one that works best.   Set realistic goals for your treatment. Even if you can't completely get rid of your pain, you might be able to control it enough so you can do the activities you like.  Is there anything I can do on my own to feel better? -- Yes. It can help to:   Try things like heat, cold, or massage - Depending on the cause of your pain, these things might help. Check with your doctor to make sure that it is OK for your  condition.   Practice relaxing - You can learn methods to relax your body, such as doing deep breathing exercises. Ask your doctor or nurse about these methods. Relaxing the mind can help with how the body feels pain. People can learn to quiet their pain or make it less bothersome. It can also help to make changes to improve your sleep habits.   Stay as active as possible - Walking, swimming, anju chi (a kind of martial art), or biking can all help ease muscle and joint pain. Even gentle forms of exercise are good for your health. If you are not active, your pain might get worse.  If you haven't been active for a while, start slowly. Make small increases in the intensity and amount of time you spend exercising. If exercising makes your pain worse, talk with your doctor. They might recommend a program that can help you get more active.  What medical care will I need? -- Many people need a team to help manage their care. A treatment team usually includes:   Doctors or specialists   A physical therapist   Someone trained in mental health, like a  or counselor  Your team will probably want to see you regularly:   They will ask you questions about your pain and other symptoms. They will also work with you to learn how treatment is working and make changes if needed.   They will ask you about your mood and your mental health. Depression and chronic pain can make each other worse. If you have depression, they might suggest treatment for it.  As you get better at managing your pain, you might see your team less frequently.  All topics are updated as new evidence becomes available and our peer review process is complete.  This topic retrieved from [employer] on: Mar 29, 2024.  Topic 93936 Version 18.0  Release: 32.2.4 - C32.87  © 2024 UpToDate, Inc. and/or its affiliates. All rights reserved.  Consumer Information Use and Disclaimer   Disclaimer: This generalized information is a limited summary of diagnosis,  treatment, and/or medication information. It is not meant to be comprehensive and should be used as a tool to help the user understand and/or assess potential diagnostic and treatment options. It does NOT include all information about conditions, treatments, medications, side effects, or risks that may apply to a specific patient. It is not intended to be medical advice or a substitute for the medical advice, diagnosis, or treatment of a health care provider based on the health care provider's examination and assessment of a patient's specific and unique circumstances. Patients must speak with a health care provider for complete information about their health, medical questions, and treatment options, including any risks or benefits regarding use of medications. This information does not endorse any treatments or medications as safe, effective, or approved for treating a specific patient. UpToDate, Inc. and its affiliates disclaim any warranty or liability relating to this information or the use thereof.The use of this information is governed by the Terms of Use, available at https://www.[employer].com/en/know/clinical-effectiveness-terms. 2024© UpToDate, Inc. and its affiliates and/or licensors. All rights reserved.  Copyright   © 2024 UpToDate, Inc. and/or its affiliates. All rights reserved.

## 2025-01-15 NOTE — ASSESSMENT & PLAN NOTE
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

## 2025-01-15 NOTE — ASSESSMENT & PLAN NOTE
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  Did drug screen in place remaining follow-up plan finding same as under lumbar foraminal stenosis and lumbar spondylosis with myelopathy

## 2025-01-15 NOTE — ASSESSMENT & PLAN NOTE
helping patient function carry out day-to-day activities and work full-time patient's work does involve physical weight lifting and extensive walking remaining follow-up plan finding as follows from a previous progress note Percocet 5 to 6 mg 4 times a day was refilled remaining follow-up plan finding as follows      All medical records reviewed and reconciled patient PDMP database reviewed to ensure compliant with the treatment plan for pain management benefits due to the fact that patient has not responded to other measures for pain control so far and severe the pain is significant and interfering with normal daily activities I believe it is reasonable and appropriate to continue the controlled substance in order to improve his ability to function in enhance quality of life the patient has signed a narcotic agreement patient should it was utilize 1 pharmacy and not receiving narcotic from any other provider patient verbalizes understanding that breaching the contract will affect future prescription decision making and critical results in the dismissal from the practice if specifically the has been explained in understood to patient patient demonstrates following informed use of appropriate medicine to analgesia increase activity explained the side effects recommended that thisto the patient patient understands that also advised that appeared to expose him order an abuse and addiction and overdose counseling provided for prevention of any overuse abuse or addiction

## 2025-01-15 NOTE — PROGRESS NOTES
Dr. Bradford's Office Visit Note  01/15/25     Ifeanyi Puga 59 y.o. male MRN: [id number]  : 1965    Assessment:     1. 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 Continuous opioid dependence (HCC)  Assessment & Plan:  Patient on Percocet 10-3 25 4 times a day as needed for more than 7 years been evaluated by pain management for her lumbar spine stenosis with radiculopathy myelopathy symptoms treated epidural injections no improvement physical therapy no improvement recommend surgery patient is working full-time this regimen helping patient function clear day-to-day activities and work full-time no evidence of any overuse abuse addiction pain contract in place.  Rec drug screen reviewed    Remaining follow-up on finding same as under lumbar foraminal stenosis and lumbar spondylosis with myelopathy  3. Lumbar disc herniation  Assessment & Plan:  helping patient function carry out day-to-day activities and work full-time patient's work does involve physical weight lifting and extensive walking remaining follow-up plan finding as follows from a previous progress note Percocet 5 to 6 mg 4 times a day was refilled remaining follow-up plan finding as follows      All medical records reviewed and reconciled patient PDMP database reviewed to ensure compliant with the treatment plan for pain management benefits due to the fact that patient has not responded to other measures for pain control so far and severe the pain is significant and interfering with  normal daily activities I believe it is reasonable and appropriate to continue the controlled substance in order to improve his ability to function in enhance quality of life the patient has signed a narcotic agreement patient should it was utilize 1 pharmacy and not receiving narcotic from any other provider patient verbalizes understanding that breaching the contract will affect future prescription decision making and critical results in the dismissal from the practice if specifically the has been explained in understood to patient patient demonstrates following informed use of appropriate medicine to analgesia increase activity explained the side effects recommended that thisto the patient patient understands that also advised that appeared to expose him order an abuse and addiction and overdose counseling provided for prevention of any overuse abuse or addiction  Orders:  -     oxyCODONE-acetaminophen (PERCOCET)  mg per tablet; Take 1 tablet by mouth every 8 (eight) hours as needed for severe pain Max Daily Amount: 3 tablets  4. Lumbar foraminal stenosis  Assessment & Plan:  Same as under lumbar disc herniation Percocet was refilled  Orders:  -     oxyCODONE-acetaminophen (PERCOCET)  mg per tablet; Take 1 tablet by mouth every 8 (eight) hours as needed for severe pain Max Daily Amount: 3 tablets  5. Lumbar spondylosis with myelopathy  Assessment & Plan:  Same is under lumbar disc herniationComplains of intractable lower back pain radiating both lower extremity tingling numbness with intermittent weakness causing difficulty ambulating on Percocet 10-3 25 4 times a day was refilled further management same as under lumbar spine stenosis and lumbar disc herniation  Orders:  -     oxyCODONE-acetaminophen (PERCOCET)  mg per tablet; Take 1 tablet by mouth every 8 (eight) hours as needed for severe pain Max Daily Amount: 3 tablets        Discussion Summary and Plan:  Today's care plan and medications  were reviewed with patient in detail and all their questions answered to their satisfaction.    Chief Complaint   Patient presents with   • Follow-up      Subjective:  Came in for follow-up chronic medical condition list visit diagnosis and refill for Percocet 10/325 4 times a day was refilled helping patient control pain function care of day-to-day activities remaining follow-up plan finding same as under visit diagnosis        The following portions of the patient's history were reviewed and updated as appropriate: allergies, current medications, past family history, past medical history, past social history, past surgical history and problem list.    Review of Systems   Constitutional:  Positive for activity change. Negative for appetite change, chills, diaphoresis, fatigue, fever and unexpected weight change.   HENT:  Negative for congestion, dental problem, drooling, ear discharge, ear pain, facial swelling, hearing loss, mouth sores, nosebleeds, postnasal drip, rhinorrhea, sinus pressure, sneezing, sore throat, tinnitus, trouble swallowing and voice change.    Eyes:  Negative for photophobia, pain, discharge, redness, itching and visual disturbance.   Respiratory:  Negative for apnea, cough, choking, chest tightness, shortness of breath, wheezing and stridor.    Cardiovascular:  Negative for chest pain, palpitations and leg swelling.   Gastrointestinal:  Negative for abdominal distention, abdominal pain, anal bleeding, blood in stool, constipation, diarrhea, nausea, rectal pain and vomiting.   Endocrine: Negative for cold intolerance, heat intolerance, polydipsia, polyphagia and polyuria.   Genitourinary:  Negative for decreased urine volume, difficulty urinating, dysuria, enuresis, flank pain, frequency, genital sores, hematuria and urgency.   Musculoskeletal:  Positive for arthralgias, back pain, gait problem, joint swelling and myalgias. Negative for neck stiffness.   Skin:  Negative for color change,  pallor, rash and wound.   Allergic/Immunologic: Negative.  Negative for environmental allergies, food allergies and immunocompromised state.   Neurological:  Negative for dizziness, tremors, seizures, syncope, facial asymmetry, speech difficulty, weakness, light-headedness, numbness and headaches.   Psychiatric/Behavioral:  Negative for agitation, behavioral problems, confusion, decreased concentration, dysphoric mood, hallucinations, self-injury, sleep disturbance and suicidal ideas. The patient is not nervous/anxious and is not hyperactive.          Historical Information   Patient Active Problem List   Diagnosis   • Benign essential hypertension   • Benign prostatic hyperplasia   • DDD (degenerative disc disease), lumbar   • Incomplete emptying of bladder   • Lumbar disc herniation   • Lumbar foraminal stenosis   • Neuropathy   • Chronic intractable pain   • Mild episode of recurrent major depressive disorder (HCC)   • Hematospermia   • Prostatitis   • Other male erectile dysfunction   • Annual physical exam   • Primary osteoarthritis of both knees   • Chronic cough   • Non-seasonal allergic rhinitis due to pollen   • Continuous opioid dependence (HCC)   • Pain   • Swelling   • Intermittent claudication due to atherosclerosis of artery of extremity (HCC)   • Lumbar spondylosis   • Gastric bypass status for obesity   • Morbid (severe) obesity due to excess calories (HCC)   • Uncomplicated opioid dependence (HCC)   • Allergic reaction   • Prediabetes   • Mixed hyperlipidemia   • Restless leg   • Mild intermittent asthma without complication   • Lumbar spondylosis with myelopathy   • Premature ejaculation     Past Medical History:   Diagnosis Date   • Abdominal pain    • Anxiety disorder    • Arthritis    • Back pain    • Dizziness    • Headache    • Hypertension    • Lightheadedness    • Obesity    • Palpitations    • SOB (shortness of breath)      Past Surgical History:   Procedure Laterality Date   •  CHOLECYSTECTOMY     • GASTRIC BYPASS       Social History     Substance and Sexual Activity   Alcohol Use None    Comment: none per [place]     Social History     Substance and Sexual Activity   Drug Use Not on file    Comment: none per [place]     Social History     Tobacco Use   Smoking Status Never   Smokeless Tobacco Never     History reviewed. No pertinent family history.  Health Maintenance Due   Topic   • Hepatitis C Screening    • Pneumococcal Vaccine: Pediatrics (0 to 5 Years) and At-Risk Patients (6 to 64 Years) (1 of 2 - PCV)   • HIV Screening    • Zoster Vaccine (1 of 2)   • Annual Physical    • COVID-19 Vaccine (7 - 2024-25 season)      Meds/Allergies       Current Outpatient Medications:   •  amlodipine-olmesartan (Soni) 5-40 MG, Take 1 tablet by mouth daily, Disp: 90 tablet, Rfl: 2  •  celecoxib (CeleBREX) 200 mg capsule, Take 1 capsule (200 mg total) by mouth 2 (two) times a day, Disp: 180 capsule, Rfl: 1  •  cetirizine (ZyrTEC) 10 mg tablet, Take 1 tablet (10 mg total) by mouth daily, Disp: 30 tablet, Rfl: 2  •  escitalopram (LEXAPRO) 20 mg tablet, Take 1 tablet (20 mg total) by mouth daily, Disp: 90 tablet, Rfl: 3  •  fluticasone (FLONASE) 50 mcg/act nasal spray, 1 spray into each nostril 2 (two) times a day, Disp: 18.2 mL, Rfl: 2  •  fluticasone-vilanterol 200-25 mcg/actuation inhaler, Inhale 1 puff daily Rinse mouth after use., Disp: 60 blister, Rfl: 5  •  lidocaine-prilocaine (EMLA) cream, Apply topically as needed for mild pain, Disp: 18 g, Rfl: 1  •  oxyCODONE-acetaminophen (PERCOCET)  mg per tablet, Take 1 tablet by mouth every 8 (eight) hours as needed for severe pain Max Daily Amount: 3 tablets, Disp: 90 tablet, Rfl: 0  •  pramipexole (MIRAPEX) 0.5 mg tablet, Take 2 tablets (1 mg total) by mouth daily at bedtime, Disp: 90 tablet, Rfl: 3  •  pregabalin (LYRICA) 100 mg capsule, Take 1 capsule (100 mg total) by mouth 2 (two) times a day, Disp: 180 capsule, Rfl: 2  •  sildenafil (Viagra)  "100 mg tablet, Take 1 tablet (100 mg total) by mouth daily as needed for erectile dysfunction, Disp: 10 tablet, Rfl: 55  •  tamsulosin (FLOMAX) 0.4 mg, Take 1 capsule (0.4 mg total) by mouth daily with dinner, Disp: 90 capsule, Rfl: 2  •  triamterene-hydrochlorothiazide (MAXZIDE-25) 37.5-25 mg per tablet, Take 1 tablet by mouth daily, Disp: 90 tablet, Rfl: 1      Objective:    Vitals:   /74 (BP Location: Left arm, Patient Position: Sitting, Cuff Size: Large)   Pulse 82   Temp 97.8 °F (36.6 °C) (Temporal)   Ht 5' 11\" (1.803 m)   Wt 105 kg (232 lb)   SpO2 100%   BMI 32.36 kg/m²   Body mass index is 32.36 kg/m².  Vitals:    01/15/25 1549   Weight: 105 kg (232 lb)       Physical Exam  Vitals and nursing note reviewed.   Constitutional:       General: He is not in acute distress.     Appearance: He is well-developed. He is not ill-appearing, toxic-appearing or diaphoretic.   HENT:      Head: Normocephalic and atraumatic.      Right Ear: External ear normal.      Left Ear: External ear normal.      Nose: Nose normal.      Mouth/Throat:      Pharynx: No oropharyngeal exudate.   Eyes:      General: Lids are normal. Lids are everted, no foreign bodies appreciated. No scleral icterus.        Right eye: No discharge.         Left eye: No discharge.      Conjunctiva/sclera: Conjunctivae normal.      Pupils: Pupils are equal, round, and reactive to light.   Neck:      Thyroid: No thyromegaly.      Vascular: Normal carotid pulses. No carotid bruit, hepatojugular reflux or JVD.      Trachea: No tracheal tenderness or tracheal deviation.   Cardiovascular:      Rate and Rhythm: Normal rate and regular rhythm.      Pulses: Normal pulses.      Heart sounds: Normal heart sounds. No murmur heard.     No friction rub. No gallop.   Pulmonary:      Effort: Pulmonary effort is normal. No respiratory distress.      Breath sounds: Normal breath sounds. No stridor. No wheezing or rales.   Chest:      Chest wall: No tenderness. "   Abdominal:      General: Bowel sounds are normal. There is no distension.      Palpations: Abdomen is soft. There is no mass.      Tenderness: There is no abdominal tenderness. There is no guarding or rebound.   Musculoskeletal:         General: No tenderness or deformity. Normal range of motion.      Cervical back: Normal range of motion and neck supple. No edema, erythema or rigidity. No spinous process tenderness or muscular tenderness. Normal range of motion.   Lymphadenopathy:      Head:      Right side of head: No submental, submandibular, tonsillar, preauricular or posterior auricular adenopathy.      Left side of head: No submental, submandibular, tonsillar, preauricular, posterior auricular or occipital adenopathy.      Cervical: No cervical adenopathy.      Right cervical: No superficial, deep or posterior cervical adenopathy.     Left cervical: No superficial, deep or posterior cervical adenopathy.      Upper Body:      Right upper body: No pectoral adenopathy.      Left upper body: No pectoral adenopathy.   Skin:     General: Skin is warm and dry.      Coloration: Skin is not pale.      Findings: No erythema or rash.   Neurological:      General: No focal deficit present.      Mental Status: He is alert and oriented to person, place, and time.      Cranial Nerves: No cranial nerve deficit.      Sensory: No sensory deficit.      Motor: No tremor, abnormal muscle tone or seizure activity.      Coordination: Coordination normal.      Gait: Gait abnormal.      Deep Tendon Reflexes: Reflexes are normal and symmetric. Reflexes normal.   Psychiatric:         Behavior: Behavior normal.         Thought Content: Thought content normal.         Judgment: Judgment normal.         Lab Review   No visits with results within 2 Month(s) from this visit.   Latest known visit with results is:   Orders Only on 05/18/2024   Component Date Value Ref Range Status   • Creatinine(Crt),U 05/18/2024 76.8  50.0 - 200.0 mg/dL  Final   • Albumin,U,Random 05/18/2024 <0.7  <3.0 mg/dL Final   • Microalb/Creat Ratio 05/18/2024 Unable to perform calculation.  <30.0 mg/gm CREA Final         Patient Instructions   Patient Education     Chronic pain   The Basics   Written by the doctors and editors at [facility]   What is chronic pain? -- This is pain that lasts longer than 3 to 6 months. In many cases, this means that pain continues even after the injury or condition that first caused it has healed.  Pain can affect the body in different ways. For example, pain can be:   An ache deep inside the muscle or bone   Stabbing or shooting, often with tingling or numbness   Dull and throbbing  People who have chronic pain might have a hard time doing their usual activities, such as bathing or dressing. This can lead to depression and anxiety. It can also cause problems with sleep.  What causes chronic pain? -- It is not always clear. Sometimes, it is caused by an ongoing medical problem, such as arthritis or nerve damage from diabetes. But doctors cannot always find the cause of chronic pain.  In some cases, people with chronic pain must accept that their pain will never be explained. This means that they have to work with their health care team to address the pain, even if they don't know its cause.  Will I need tests? -- Your doctor might do tests to figure out the cause of your pain. For example, you might get:   Blood tests - These can check for infection, signs of inflammation, or diseases that can cause pain.   X-rays or other imaging tests - Imaging tests create pictures of the inside of the body. They can check for bone fractures, joint damage, cancer, or other changes in your body that could cause pain.   Nerve tests - These are ways to check whether the nerves are working normally.  However, tests cannot always show the cause of pain. Scientists think that in some people, the pain signals in the brain stop working normally. The signals get  "\"stuck\" in the on position, even when the source of pain is gone.  How is chronic pain treated? -- There are many different ways to manage chronic pain. Most people need to try different combinations. The right approach for you depends on your pain as well as your lifestyle and preferences.  Treatment options include:   Non-medicine techniques - Doctors recommend first trying to manage pain without medicines. This can involve lots of different things, such as:   Working with a counselor or therapist   Physical therapy or an exercise program   Lifestyle and behavior changes to improve your mood, sleep, diet, and stress level   Acupuncture   Massage   Ice or heat   Devices that affect nerve signals   Medicines - When the above methods are not enough to relieve pain, doctors can recommend medicines. Different medicines work in different ways. Some pain medicines, like opioids, are not recommended for treating chronic pain in most cases. That's because they come with serious risks. Doctors usually try other medicines first.  If your doctor suggests a medicine that seems strange, keep an open mind. Sometimes, doctors treat pain with medicines made to treat other medical problems. For example, medicines for depression can help with pain, because they work on areas of the brain that process pain. Doctors can also use medicines for seizures to treat pain, because they help with overactive nerves.   Other treatments - These might include:   Injections (shots) of numbing or pain-relieving medicines   Surgery  To find the best treatment for you:   Be open to trying new treatments and combinations of treatments. Sometimes, you have to try a few different options before you find one that works best.   Set realistic goals for your treatment. Even if you can't completely get rid of your pain, you might be able to control it enough so you can do the activities you like.  Is there anything I can do on my own to feel better? -- Yes. " It can help to:   Try things like heat, cold, or massage - Depending on the cause of your pain, these things might help. Check with your doctor to make sure that it is OK for your condition.   Practice relaxing - You can learn methods to relax your body, such as doing deep breathing exercises. Ask your doctor or nurse about these methods. Relaxing the mind can help with how the body feels pain. People can learn to quiet their pain or make it less bothersome. It can also help to make changes to improve your sleep habits.   Stay as active as possible - Walking, swimming, anju chi (a kind of martial art), or biking can all help ease muscle and joint pain. Even gentle forms of exercise are good for your health. If you are not active, your pain might get worse.  If you haven't been active for a while, start slowly. Make small increases in the intensity and amount of time you spend exercising. If exercising makes your pain worse, talk with your doctor. They might recommend a program that can help you get more active.  What medical care will I need? -- Many people need a team to help manage their care. A treatment team usually includes:   Doctors or specialists   A physical therapist   Someone trained in mental health, like a  or counselor  Your team will probably want to see you regularly:   They will ask you questions about your pain and other symptoms. They will also work with you to learn how treatment is working and make changes if needed.   They will ask you about your mood and your mental health. Depression and chronic pain can make each other worse. If you have depression, they might suggest treatment for it.  As you get better at managing your pain, you might see your team less frequently.  All topics are updated as new evidence becomes available and our peer review process is complete.  This topic retrieved from [employer] on: Mar 29, 2024.  Topic 94776 Version 18.0  Release: 32.2.4 - C32.87  © 2024  "UpToDate, Inc. and/or its affiliates. All rights reserved.  Consumer Information Use and Disclaimer   Disclaimer: This generalized information is a limited summary of diagnosis, treatment, and/or medication information. It is not meant to be comprehensive and should be used as a tool to help the user understand and/or assess potential diagnostic and treatment options. It does NOT include all information about conditions, treatments, medications, side effects, or risks that may apply to a specific patient. It is not intended to be medical advice or a substitute for the medical advice, diagnosis, or treatment of a health care provider based on the health care provider's examination and assessment of a patient's specific and unique circumstances. Patients must speak with a health care provider for complete information about their health, medical questions, and treatment options, including any risks or benefits regarding use of medications. This information does not endorse any treatments or medications as safe, effective, or approved for treating a specific patient. UpToDate, Inc. and its affiliates disclaim any warranty or liability relating to this information or the use thereof.The use of this information is governed by the Terms of Use, available at https://www.[employer].com/en/know/clinical-effectiveness-terms. 2024© UpToDate, Inc. and its affiliates and/or licensors. All rights reserved.  Copyright   © 2024 UpToDate, Inc. and/or its affiliates. All rights reserved.       Wilmar Bradford MD        \"This note has been constructed using a voice recognition system.Therefore there may be syntax, spelling, and/or grammatical errors. Please call if you have any questions. \"  "

## 2025-01-15 NOTE — ASSESSMENT & PLAN NOTE
Same is under lumbar disc herniationComplains of intractable lower back pain radiating both lower extremity tingling numbness with intermittent weakness causing difficulty ambulating on Percocet 10-3 25 4 times a day was refilled further management same as under lumbar spine stenosis and lumbar disc herniation

## 2025-01-15 NOTE — ASSESSMENT & PLAN NOTE
Patient on Percocet 10-3 25 4 times a day as needed for more than 7 years been evaluated by pain management for her lumbar spine stenosis with radiculopathy myelopathy symptoms treated epidural injections no improvement physical therapy no improvement recommend surgery patient is working full-time this regimen helping patient function clear day-to-day activities and work full-time no evidence of any overuse abuse addiction pain contract in place.  Rec drug screen reviewed    Remaining follow-up on finding same as under lumbar foraminal stenosis and lumbar spondylosis with myelopathy

## 2025-02-12 ENCOUNTER — OFFICE VISIT (OUTPATIENT)
Dept: INTERNAL MEDICINE CLINIC | Facility: CLINIC | Age: 60
End: 2025-02-12
Payer: COMMERCIAL

## 2025-02-12 VITALS
OXYGEN SATURATION: 98 % | HEIGHT: 71 IN | DIASTOLIC BLOOD PRESSURE: 80 MMHG | WEIGHT: 235 LBS | BODY MASS INDEX: 32.9 KG/M2 | HEART RATE: 105 BPM | SYSTOLIC BLOOD PRESSURE: 118 MMHG

## 2025-02-12 DIAGNOSIS — M48.061 LUMBAR FORAMINAL STENOSIS: ICD-10-CM

## 2025-02-12 DIAGNOSIS — M51.26 LUMBAR DISC HERNIATION: ICD-10-CM

## 2025-02-12 DIAGNOSIS — Z00.00 ANNUAL PHYSICAL EXAM: Primary | ICD-10-CM

## 2025-02-12 DIAGNOSIS — M47.16 LUMBAR SPONDYLOSIS WITH MYELOPATHY: ICD-10-CM

## 2025-02-12 PROCEDURE — 99396 PREV VISIT EST AGE 40-64: CPT | Performed by: INTERNAL MEDICINE

## 2025-02-12 RX ORDER — OXYCODONE AND ACETAMINOPHEN 10; 325 MG/1; MG/1
1 TABLET ORAL EVERY 8 HOURS PRN
Qty: 90 TABLET | Refills: 0 | Status: SHIPPED | OUTPATIENT
Start: 2025-02-12

## 2025-02-12 NOTE — ASSESSMENT & PLAN NOTE
Complete physical done all the preventive measures discussed    The age-appropriate screening done    For cancer screening follow-up recommendations see attached encounter and discharge instruction

## 2025-02-12 NOTE — PATIENT INSTRUCTIONS
"Patient Education     Routine physical for adults   The Basics   Written by the doctors and editors at [facility]   What is a physical? -- A physical is a routine visit, or \"check-up,\" with your doctor. You might also hear it called a \"wellness visit\" or \"preventive visit.\"  During each visit, the doctor will:   Ask about your physical and mental health   Ask about your habits, behaviors, and lifestyle   Do an exam   Give you vaccines if needed   Talk to you about any medicines you take   Give advice about your health   Answer your questions  Getting regular check-ups is an important part of taking care of your health. It can help your doctor find and treat any problems you have. But it's also important for preventing health problems.  A routine physical is different from a \"sick visit.\" A sick visit is when you see a doctor because of a health concern or problem. Since physicals are scheduled ahead of time, you can think about what you want to ask the doctor.  How often should I get a physical? -- It depends on your age and health. In general, for people age 21 years and older:   If you are younger than 50 years, you might be able to get a physical every 3 years.   If you are 50 years or older, your doctor might recommend a physical every year.  If you have an ongoing health condition, like diabetes or high blood pressure, your doctor will probably want to see you more often.  What happens during a physical? -- In general, each visit will include:   Physical exam - The doctor or nurse will check your height, weight, heart rate, and blood pressure. They will also look at your eyes and ears. They will ask about how you are feeling and whether you have any symptoms that bother you.   Medicines - It's a good idea to bring a list of all the medicines you take to each doctor visit. Your doctor will talk to you about your medicines and answer any questions. Tell them if you are having any side effects that bother you. You " "should also tell them if you are having trouble paying for any of your medicines.   Habits and behaviors - This includes:   Your diet   Your exercise habits   Whether you smoke, drink alcohol, or use drugs   Whether you are sexually active   Whether you feel safe at home  Your doctor will talk to you about things you can do to improve your health and lower your risk of health problems. They will also offer help and support. For example, if you want to quit smoking, they can give you advice and might prescribe medicines. If you want to improve your diet or get more physical activity, they can help you with this, too.   Lab tests, if needed - The tests you get will depend on your age and situation. For example, your doctor might want to check your:   Cholesterol   Blood sugar   Iron level   Vaccines - The recommended vaccines will depend on your age, health, and what vaccines you already had. Vaccines are very important because they can prevent certain serious or deadly infections.   Discussion of screening - \"Screening\" means checking for diseases or other health problems before they cause symptoms. Your doctor can recommend screening based on your age, risk, and preferences. This might include tests to check for:   Cancer, such as breast, prostate, cervical, ovarian, colorectal, prostate, lung, or skin cancer   Sexually transmitted infections, such as chlamydia and gonorrhea   Mental health conditions like depression and anxiety  Your doctor will talk to you about the different types of screening tests. They can help you decide which screenings to have. They can also explain what the results might mean.   Answering questions - The physical is a good time to ask the doctor or nurse questions about your health. If needed, they can refer you to other doctors or specialists, too.  Adults older than 65 years often need other care, too. As you get older, your doctor will talk to you about:   How to prevent falling at " home   Hearing or vision tests   Memory testing   How to take your medicines safely   Making sure that you have the help and support you need at home  All topics are updated as new evidence becomes available and our peer review process is complete.  This topic retrieved from [employer] on: May 02, 2024.  Topic 612485 Version 1.0  Release: 32.4.3 - C32.122  © 2024 UpToDate, Inc. and/or its affiliates. All rights reserved.  Consumer Information Use and Disclaimer   Disclaimer: This generalized information is a limited summary of diagnosis, treatment, and/or medication information. It is not meant to be comprehensive and should be used as a tool to help the user understand and/or assess potential diagnostic and treatment options. It does NOT include all information about conditions, treatments, medications, side effects, or risks that may apply to a specific patient. It is not intended to be medical advice or a substitute for the medical advice, diagnosis, or treatment of a health care provider based on the health care provider's examination and assessment of a patient's specific and unique circumstances. Patients must speak with a health care provider for complete information about their health, medical questions, and treatment options, including any risks or benefits regarding use of medications. This information does not endorse any treatments or medications as safe, effective, or approved for treating a specific patient. UpToDate, Inc. and its affiliates disclaim any warranty or liability relating to this information or the use thereof.The use of this information is governed by the Terms of Use, available at https://www.w[employer]Visual.iouwer.com/en/know/clinical-effectiveness-terms. 2024© UpToDate, Inc. and its affiliates and/or licensors. All rights reserved.  Copyright   © 2024 UpToDate, Inc. and/or its affiliates. All rights reserved.

## 2025-02-12 NOTE — ASSESSMENT & PLAN NOTE
Same as under lumbar spondylosis with myelopathy  Orders:  •  oxyCODONE-acetaminophen (PERCOCET)  mg per tablet; Take 1 tablet by mouth every 8 (eight) hours as needed for severe pain Max Daily Amount: 3 tablets

## 2025-02-12 NOTE — ASSESSMENT & PLAN NOTE
Same is lumbar spondylosis with myelopathy  Orders:  •  oxyCODONE-acetaminophen (PERCOCET)  mg per tablet; Take 1 tablet by mouth every 8 (eight) hours as needed for severe pain Max Daily Amount: 3 tablets

## 2025-02-12 NOTE — ASSESSMENT & PLAN NOTE
Percocet 10-3 25 3 times a day was refilled helping patient work full-time and clear day-to-day activities no evidence of any overuse or abuse addiction    Counseling done as follows    All medical records reviewed and reconciled patient PDMP database reviewed to ensure compliant with the treatment plan for pain management benefits due to the fact that patient has not responded to other measures for pain control so far and severe the pain is significant and interfering with normal daily activities I believe it is reasonable and appropriate to continue the controlled substance in order to improve his ability to function in enhance quality of life the patient has signed a narcotic agreement patient should it was utilize 1 pharmacy and not receiving narcotic from any other provider patient verbalizes understanding that breaching the contract will affect future prescription decision making and critical results in the dismissal from the practice if specifically the has been explained in understood to patient patient demonstrates following informed use of appropriate medicine to analgesia increase activity explained the side effects recommended that thisto the patient patient understands that also advised that appeared to expose him order an abuse and addiction and overdose counseling provided for prevention of any overuse abuse or addiction       As follows  Orders:  •  oxyCODONE-acetaminophen (PERCOCET)  mg per tablet; Take 1 tablet by mouth every 8 (eight) hours as needed for severe pain Max Daily Amount: 3 tablets

## 2025-02-12 NOTE — PROGRESS NOTES
Adult Annual Physical  Name: Ifeanyi Puga      : 1965      MRN: [id number]  Encounter Provider: Wilmar Bradford MD  Encounter Date: 2025   Encounter department: [facility] INTERNAL MEDICINE    Assessment & Plan  Annual physical exam  Complete physical done all the preventive measures discussed    The age-appropriate screening done    For cancer screening follow-up recommendations see attached encounter and discharge instruction       Lumbar disc herniation  Same as under lumbar spondylosis with myelopathy  Orders:  •  oxyCODONE-acetaminophen (PERCOCET)  mg per tablet; Take 1 tablet by mouth every 8 (eight) hours as needed for severe pain Max Daily Amount: 3 tablets    Lumbar foraminal stenosis  Same is lumbar spondylosis with myelopathy  Orders:  •  oxyCODONE-acetaminophen (PERCOCET)  mg per tablet; Take 1 tablet by mouth every 8 (eight) hours as needed for severe pain Max Daily Amount: 3 tablets    Lumbar spondylosis with myelopathy  Percocet 10-3 25 3 times a day was refilled helping patient work full-time and clear day-to-day activities no evidence of any overuse or abuse addiction    Counseling done as follows    All medical records reviewed and reconciled patient PDMP database reviewed to ensure compliant with the treatment plan for pain management benefits due to the fact that patient has not responded to other measures for pain control so far and severe the pain is significant and interfering with normal daily activities I believe it is reasonable and appropriate to continue the controlled substance in order to improve his ability to function in enhance quality of life the patient has signed a narcotic agreement patient should it was utilize 1 pharmacy and not receiving narcotic from any other provider patient verbalizes understanding that breaching the contract will affect future prescription decision making and critical results in the dismissal from the practice if  specifically the has been explained in understood to patient patient demonstrates following informed use of appropriate medicine to analgesia increase activity explained the side effects recommended that thisto the patient patient understands that also advised that appeared to expose him order an abuse and addiction and overdose counseling provided for prevention of any overuse abuse or addiction       As follows  Orders:  •  oxyCODONE-acetaminophen (PERCOCET)  mg per tablet; Take 1 tablet by mouth every 8 (eight) hours as needed for severe pain Max Daily Amount: 3 tablets      Immunizations and preventive care screenings were discussed with patient today. Appropriate education was printed on patient's after visit summary.    Discussed risks and benefits of prostate cancer screening. We discussed the controversial history of PSA screening for prostate cancer in the [place] as well as the risk of over detection and over treatment of prostate cancer by way of PSA screening.  The patient understands that PSA blood testing is an imperfect way to screen for prostate cancer and that elevated PSA levels in the blood may also be caused by infection, inflammation, prostatic trauma or manipulation, urological procedures, or by benign prostatic enlargement.    The role of the digital rectal examination in prostate cancer screening was also discussed and I discussed with him that there is large interobserver variability in the findings of digital rectal examination.    Counseling:  Alcohol/drug use: discussed moderation in alcohol intake, the recommendations for healthy alcohol use, and avoidance of illicit drug use.  Dental Health: discussed importance of regular tooth brushing, flossing, and dental visits.  Injury prevention: discussed safety/seat belts, safety helmets, smoke detectors, carbon monoxide detectors, and smoking near bedding or upholstery.  Sexual health: discussed sexually transmitted diseases,  partner selection, use of condoms, avoidance of unintended pregnancy, and contraceptive alternatives.  Exercise: the importance of regular exercise/physical activity was discussed. Recommend exercise 3-5 times per week for at least 30 minutes.          History of Present Illness     Adult Annual Physical:  Patient presents for annual physical.     Diet and Physical Activity:  - Diet/Nutrition: poor diet, low calorie diet, low carb diet and portion control.  - Exercise: walking.    Depression Screening:    - PHQ-9 Score: 3    General Health:  - Sleep: sleeps poorly, 4-6 hours of sleep on average and unrefreshing sleep.  - Hearing: normal hearing right ear and normal hearing left ear.  - Vision: no vision problems and vision problems.  - Dental: regular dental visits.     Health:  - History of STDs: no.   - Urinary symptoms: urinary frequency.     Advanced Care Planning:  - Has an advanced directive?: no    - Has a durable medical POA?: no    - ACP document given to patient?: no      Review of Systems   Constitutional:  Positive for activity change. Negative for appetite change, chills, diaphoresis, fatigue, fever and unexpected weight change.   HENT:  Negative for congestion, dental problem, drooling, ear discharge, ear pain, facial swelling, hearing loss, mouth sores, nosebleeds, postnasal drip, rhinorrhea, sinus pressure, sneezing, sore throat, tinnitus, trouble swallowing and voice change.    Eyes:  Negative for photophobia, pain, discharge, redness, itching and visual disturbance.   Respiratory:  Negative for apnea, cough, choking, chest tightness, shortness of breath, wheezing and stridor.    Cardiovascular:  Negative for chest pain, palpitations and leg swelling.   Gastrointestinal:  Negative for abdominal distention, abdominal pain, anal bleeding, blood in stool, constipation, diarrhea, nausea, rectal pain and vomiting.   Endocrine: Negative for cold intolerance, heat intolerance, polydipsia, polyphagia and  "polyuria.   Genitourinary:  Negative for decreased urine volume, difficulty urinating, dysuria, enuresis, flank pain, frequency, genital sores, hematuria and urgency.   Musculoskeletal:  Positive for arthralgias, back pain, gait problem, joint swelling, myalgias and neck pain. Negative for neck stiffness.   Skin:  Negative for color change, pallor, rash and wound.   Allergic/Immunologic: Negative.  Negative for environmental allergies, food allergies and immunocompromised state.   Neurological:  Negative for dizziness, tremors, seizures, syncope, facial asymmetry, speech difficulty, weakness, light-headedness, numbness and headaches.   Psychiatric/Behavioral:  Negative for agitation, behavioral problems, confusion, decreased concentration, dysphoric mood, hallucinations, self-injury, sleep disturbance and suicidal ideas. The patient is not nervous/anxious and is not hyperactive.          Objective   /80   Pulse 105   Ht 5' 11\" (1.803 m)   Wt 107 kg (235 lb)   SpO2 98%   BMI 32.78 kg/m²     Physical Exam  Vitals and nursing note reviewed.   Constitutional:       General: He is not in acute distress.     Appearance: He is well-developed. He is not ill-appearing, toxic-appearing or diaphoretic.   HENT:      Head: Normocephalic and atraumatic.      Right Ear: External ear normal.      Left Ear: External ear normal.      Nose: Nose normal.      Mouth/Throat:      Pharynx: No oropharyngeal exudate.   Eyes:      General: Lids are normal. Lids are everted, no foreign bodies appreciated. No scleral icterus.        Right eye: No discharge.         Left eye: No discharge.      Conjunctiva/sclera: Conjunctivae normal.      Pupils: Pupils are equal, round, and reactive to light.   Neck:      Thyroid: No thyromegaly.      Vascular: Normal carotid pulses. No carotid bruit, hepatojugular reflux or JVD.      Trachea: No tracheal tenderness or tracheal deviation.   Cardiovascular:      Rate and Rhythm: Normal rate and " regular rhythm.      Pulses: Normal pulses.      Heart sounds: Normal heart sounds. No murmur heard.     No friction rub. No gallop.   Pulmonary:      Effort: Pulmonary effort is normal. No respiratory distress.      Breath sounds: Normal breath sounds. No stridor. No wheezing or rales.   Chest:      Chest wall: No tenderness.   Abdominal:      General: Bowel sounds are normal. There is no distension.      Palpations: Abdomen is soft. There is no mass.      Tenderness: There is no abdominal tenderness. There is no guarding or rebound.   Musculoskeletal:         General: No tenderness or deformity. Normal range of motion.      Cervical back: Normal range of motion and neck supple. No edema, erythema or rigidity. No spinous process tenderness or muscular tenderness. Normal range of motion.   Lymphadenopathy:      Head:      Right side of head: No submental, submandibular, tonsillar, preauricular or posterior auricular adenopathy.      Left side of head: No submental, submandibular, tonsillar, preauricular, posterior auricular or occipital adenopathy.      Cervical: No cervical adenopathy.      Right cervical: No superficial, deep or posterior cervical adenopathy.     Left cervical: No superficial, deep or posterior cervical adenopathy.      Upper Body:      Right upper body: No pectoral adenopathy.      Left upper body: No pectoral adenopathy.   Skin:     General: Skin is warm and dry.      Coloration: Skin is not pale.      Findings: No erythema or rash.   Neurological:      General: No focal deficit present.      Mental Status: He is alert and oriented to person, place, and time.      Cranial Nerves: No cranial nerve deficit.      Sensory: No sensory deficit.      Motor: No tremor, abnormal muscle tone or seizure activity.      Coordination: Coordination normal.      Gait: Gait abnormal.      Deep Tendon Reflexes: Reflexes are normal and symmetric. Reflexes normal.   Psychiatric:         Behavior: Behavior normal.          Thought Content: Thought content normal.         Judgment: Judgment normal.

## 2025-03-12 ENCOUNTER — OFFICE VISIT (OUTPATIENT)
Dept: INTERNAL MEDICINE CLINIC | Facility: CLINIC | Age: 60
End: 2025-03-12
Payer: COMMERCIAL

## 2025-03-12 VITALS
BODY MASS INDEX: 32.76 KG/M2 | HEIGHT: 71 IN | SYSTOLIC BLOOD PRESSURE: 122 MMHG | WEIGHT: 234 LBS | HEART RATE: 73 BPM | DIASTOLIC BLOOD PRESSURE: 80 MMHG | OXYGEN SATURATION: 98 %

## 2025-03-12 DIAGNOSIS — R73.03 PREDIABETES: ICD-10-CM

## 2025-03-12 DIAGNOSIS — M51.26 LUMBAR DISC HERNIATION: ICD-10-CM

## 2025-03-12 DIAGNOSIS — I10 BENIGN ESSENTIAL HYPERTENSION: Primary | ICD-10-CM

## 2025-03-12 DIAGNOSIS — E78.2 MIXED HYPERLIPIDEMIA: ICD-10-CM

## 2025-03-12 DIAGNOSIS — M47.16 LUMBAR SPONDYLOSIS WITH MYELOPATHY: ICD-10-CM

## 2025-03-12 DIAGNOSIS — Z12.5 SCREENING PSA (PROSTATE SPECIFIC ANTIGEN): ICD-10-CM

## 2025-03-12 DIAGNOSIS — M48.061 LUMBAR FORAMINAL STENOSIS: ICD-10-CM

## 2025-03-12 DIAGNOSIS — Z98.84 GASTRIC BYPASS STATUS FOR OBESITY: ICD-10-CM

## 2025-03-12 PROCEDURE — 99214 OFFICE O/P EST MOD 30 MIN: CPT | Performed by: INTERNAL MEDICINE

## 2025-03-12 RX ORDER — OXYCODONE AND ACETAMINOPHEN 10; 325 MG/1; MG/1
1 TABLET ORAL EVERY 8 HOURS PRN
Qty: 90 TABLET | Refills: 0 | Status: SHIPPED | OUTPATIENT
Start: 2025-03-12

## 2025-03-12 NOTE — ASSESSMENT & PLAN NOTE
Orders:  •  oxyCODONE-acetaminophen (PERCOCET)  mg per tablet; Take 1 tablet by mouth every 8 (eight) hours as needed for severe pain Max Daily Amount: 3 tablets

## 2025-03-12 NOTE — PROGRESS NOTES
Name: Ifeanyi Puga      : 1965      MRN: [id number]  Encounter Provider: Wilmar Bradford MD  Encounter Date: 3/12/2025   Encounter department: [facility] INTERNAL MEDICINE  :  Assessment & Plan  Benign essential hypertension  Asymptomatic blood pressure controlled agree continue main medication as follow    Soni 5/40 daily    Low-salt diet    Check BMP urine analysis      37.5/25 daily no side effects  Orders:  •  Albumin / creatinine urine ratio  •  Urinalysis with microscopic  •  Comprehensive metabolic panel    Gastric bypass status for obesity  History of gastric bypass more than 15 years ago for now BMI 32.64 associated with hypertension hyperlipidemia    Will check CMP CBC depending on the results further workup treatment for follow-up  Orders:  •  CBC and differential  •  Comprehensive metabolic panel    Prediabetes  Previous A1c reviewed elevated    Advised diabetic diet    Will repeat A1c depending on the results further workup treatment  Orders:  •  Comprehensive metabolic panel  •  Hemoglobin A1C    Mixed hyperlipidemia  Previous LDL reviewed    Agree continue manage medication as follow    Patient chooses low-fat low-cholesterol diet depending on the results we will discuss further workup treatment with the patient at length  Orders:  •  CBC and differential  •  Lipid Panel with Direct LDL reflex    Screening PSA (prostate specific antigen)    Orders:  •  PSA, Total Screen; Future    Lumbar disc herniation  Same is under lumbar spondylosis with myelopathy  Orders:  •  oxyCODONE-acetaminophen (PERCOCET)  mg per tablet; Take 1 tablet by mouth every 8 (eight) hours as needed for severe pain Max Daily Amount: 3 tablets    Lumbar foraminal stenosis    Orders:  •  oxyCODONE-acetaminophen (PERCOCET)  mg per tablet; Take 1 tablet by mouth every 8 (eight) hours as needed for severe pain Max Daily Amount: 3 tablets    Lumbar spondylosis with myelopathy  Intractable pain lower  back radiating both lower extremity causing some intermittent minimal weakness causing difficulty ambulating evaluated by pain management underwent epidural injection awaiting to be reevaluated    For now Percocet 10-3 25 3 times a day as needed 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 remaining follow-up plan findings as follows    All medical records reviewed and reconciled patient PDMP database reviewed to ensure compliant with the treatment plan for pain management benefits due to the fact that patient has not responded to other measures for pain control so far and severe the pain is significant and interfering with normal daily activities I believe it is reasonable and appropriate to continue the controlled substance in order to improve his ability to function in enhance quality of life the patient has signed a narcotic agreement patient should it was utilize 1 pharmacy and not receiving narcotic from any other provider patient verbalizes understanding that breaching the contract will affect future prescription decision making and critical results in the dismissal from the practice if specifically the has been explained in understood to patient patient demonstrates following informed use of appropriate medicine to analgesia increase activity explained the side effects recommended that thisto the patient patient understands that also advised that appeared to expose him order an abuse and addiction and overdose counseling provided for prevention of any overuse abuse or addiction      Orders:  •  oxyCODONE-acetaminophen (PERCOCET)  mg per tablet; Take 1 tablet by mouth every 8 (eight) hours as needed for severe pain Max Daily Amount: 3 tablets           History of Present Illness   Came in follow-up chronic medical condition list visit diagnosis intractable lower back pain radiating both lower extremity also cervical spine pain rating right upper extremity tingling numbness awaiting to be seen  by neurologist orthopedist patient is in [facility] [address] and a refill for Percocet 10-3 25 3 times a day which helping patient function care of day-to-day activities and work full-time no side effects for details refer to assessment plan visit diagnosis and also given new set of labs to be done before next visit      Review of Systems   Constitutional:  Positive for activity change. Negative for appetite change, chills, diaphoresis, fatigue, fever and unexpected weight change.   HENT:  Negative for congestion, dental problem, drooling, ear discharge, ear pain, facial swelling, hearing loss, mouth sores, nosebleeds, postnasal drip, rhinorrhea, sinus pressure, sneezing, sore throat, tinnitus, trouble swallowing and voice change.    Eyes:  Negative for photophobia, pain, discharge, redness, itching and visual disturbance.   Respiratory:  Negative for apnea, cough, choking, chest tightness, shortness of breath, wheezing and stridor.    Cardiovascular:  Negative for chest pain, palpitations and leg swelling.   Gastrointestinal:  Negative for abdominal distention, abdominal pain, anal bleeding, blood in stool, constipation, diarrhea, nausea, rectal pain and vomiting.   Endocrine: Negative for cold intolerance, heat intolerance, polydipsia, polyphagia and polyuria.   Genitourinary:  Negative for decreased urine volume, difficulty urinating, dysuria, enuresis, flank pain, frequency, genital sores, hematuria and urgency.   Musculoskeletal:  Positive for arthralgias, back pain, gait problem and joint swelling. Negative for neck stiffness.   Skin:  Negative for color change, pallor, rash and wound.   Allergic/Immunologic: Negative.  Negative for environmental allergies, food allergies and immunocompromised state.   Neurological:  Negative for dizziness, tremors, seizures, syncope, facial asymmetry, speech difficulty, weakness, light-headedness, numbness and headaches.   Psychiatric/Behavioral:  Negative for  "agitation, behavioral problems, confusion, decreased concentration, dysphoric mood, hallucinations, self-injury, sleep disturbance and suicidal ideas. The patient is not nervous/anxious and is not hyperactive.        Objective   /80   Pulse 73   Ht 5' 11\" (1.803 m)   Wt 106 kg (234 lb)   SpO2 98%   BMI 32.64 kg/m²      Physical Exam  Vitals and nursing note reviewed.   Constitutional:       General: He is not in acute distress.     Appearance: He is well-developed. He is not ill-appearing, toxic-appearing or diaphoretic.   HENT:      Head: Normocephalic and atraumatic.      Right Ear: External ear normal.      Left Ear: External ear normal.      Nose: Nose normal.      Mouth/Throat:      Pharynx: No oropharyngeal exudate.   Eyes:      General: Lids are normal. Lids are everted, no foreign bodies appreciated. No scleral icterus.        Right eye: No discharge.         Left eye: No discharge.      Conjunctiva/sclera: Conjunctivae normal.      Pupils: Pupils are equal, round, and reactive to light.   Neck:      Thyroid: No thyromegaly.      Vascular: Normal carotid pulses. No carotid bruit, hepatojugular reflux or JVD.      Trachea: No tracheal tenderness or tracheal deviation.   Cardiovascular:      Rate and Rhythm: Normal rate and regular rhythm.      Pulses: Normal pulses.      Heart sounds: Normal heart sounds. No murmur heard.     No friction rub. No gallop.   Pulmonary:      Effort: Pulmonary effort is normal. No respiratory distress.      Breath sounds: Normal breath sounds. No stridor. No wheezing or rales.   Chest:      Chest wall: No tenderness.   Abdominal:      General: Bowel sounds are normal. There is no distension.      Palpations: Abdomen is soft. There is no mass.      Tenderness: There is no abdominal tenderness. There is no guarding or rebound.   Musculoskeletal:         General: No tenderness or deformity. Normal range of motion.      Cervical back: Normal range of motion and neck supple. " No edema, erythema or rigidity. No spinous process tenderness or muscular tenderness. Normal range of motion.   Lymphadenopathy:      Head:      Right side of head: No submental, submandibular, tonsillar, preauricular or posterior auricular adenopathy.      Left side of head: No submental, submandibular, tonsillar, preauricular, posterior auricular or occipital adenopathy.      Cervical: No cervical adenopathy.      Right cervical: No superficial, deep or posterior cervical adenopathy.     Left cervical: No superficial, deep or posterior cervical adenopathy.      Upper Body:      Right upper body: No pectoral adenopathy.      Left upper body: No pectoral adenopathy.   Skin:     General: Skin is warm and dry.      Coloration: Skin is not pale.      Findings: No erythema or rash.   Neurological:      General: No focal deficit present.      Mental Status: He is alert and oriented to person, place, and time.      Cranial Nerves: No cranial nerve deficit.      Sensory: No sensory deficit.      Motor: No tremor, abnormal muscle tone or seizure activity.      Coordination: Coordination normal.      Gait: Gait abnormal.      Deep Tendon Reflexes: Reflexes are normal and symmetric. Reflexes normal.   Psychiatric:         Behavior: Behavior normal.         Thought Content: Thought content normal.         Judgment: Judgment normal.

## 2025-03-12 NOTE — ASSESSMENT & PLAN NOTE
Previous A1c reviewed elevated    Advised diabetic diet    Will repeat A1c depending on the results further workup treatment  Orders:  •  Comprehensive metabolic panel  •  Hemoglobin A1C

## 2025-03-12 NOTE — ASSESSMENT & PLAN NOTE
Previous LDL reviewed    Agree continue manage medication as follow    Patient chooses low-fat low-cholesterol diet depending on the results we will discuss further workup treatment with the patient at length  Orders:  •  CBC and differential  •  Lipid Panel with Direct LDL reflex

## 2025-03-12 NOTE — ASSESSMENT & PLAN NOTE
Same is under lumbar spondylosis with myelopathy  Orders:  •  oxyCODONE-acetaminophen (PERCOCET)  mg per tablet; Take 1 tablet by mouth every 8 (eight) hours as needed for severe pain Max Daily Amount: 3 tablets   Quality 130: Documentation Of Current Medications In The Medical Record: Current Medications Documented Additional Notes: Pt declined influenza vaccine at this time Detail Level: Detailed Quality 110: Preventive Care And Screening: Influenza Immunization: Influenza Immunization not Administered for Documented Reasons. Quality 226: Preventive Care And Screening: Tobacco Use: Screening And Cessation Intervention: Patient screened for tobacco use and is an ex/non-smoker

## 2025-03-12 NOTE — ASSESSMENT & PLAN NOTE
History of gastric bypass more than 15 years ago for now BMI 32.64 associated with hypertension hyperlipidemia    Will check CMP CBC depending on the results further workup treatment for follow-up  Orders:  •  CBC and differential  •  Comprehensive metabolic panel

## 2025-03-12 NOTE — ASSESSMENT & PLAN NOTE
Asymptomatic blood pressure controlled agree continue main medication as follow    Soni 5/40 daily    Low-salt diet    Check BMP urine analysis      37.5/25 daily no side effects  Orders:  •  Albumin / creatinine urine ratio  •  Urinalysis with microscopic  •  Comprehensive metabolic panel

## 2025-03-12 NOTE — PATIENT INSTRUCTIONS
"Patient Education     Chronic pain   The Basics   Written by the doctors and editors at [facility]   What is chronic pain? -- This is pain that lasts longer than 3 to 6 months. In many cases, this means that pain continues even after the injury or condition that first caused it has healed.  Pain can affect the body in different ways. For example, pain can be:   An ache deep inside the muscle or bone   Stabbing or shooting, often with tingling or numbness   Dull and throbbing  People who have chronic pain might have a hard time doing their usual activities, such as bathing or dressing. This can lead to depression and anxiety. It can also cause problems with sleep.  What causes chronic pain? -- It is not always clear. Sometimes, it is caused by an ongoing medical problem, such as arthritis or nerve damage from diabetes. But doctors cannot always find the cause of chronic pain.  In some cases, people with chronic pain must accept that their pain will never be explained. This means that they have to work with their health care team to address the pain, even if they don't know its cause.  Will I need tests? -- Your doctor might do tests to figure out the cause of your pain. For example, you might get:   Blood tests - These can check for infection, signs of inflammation, or diseases that can cause pain.   X-rays or other imaging tests - Imaging tests create pictures of the inside of the body. They can check for bone fractures, joint damage, cancer, or other changes in your body that could cause pain.   Nerve tests - These are ways to check whether the nerves are working normally.  However, tests cannot always show the cause of pain. Scientists think that in some people, the pain signals in the brain stop working normally. The signals get \"stuck\" in the on position, even when the source of pain is gone.  How is chronic pain treated? -- There are many different ways to manage chronic pain. Most people need to try different " combinations. The right approach for you depends on your pain as well as your lifestyle and preferences.  Treatment options include:   Non-medicine techniques - Doctors recommend first trying to manage pain without medicines. This can involve lots of different things, such as:   Working with a counselor or therapist   Physical therapy or an exercise program   Lifestyle and behavior changes to improve your mood, sleep, diet, and stress level   Acupuncture   Massage   Ice or heat   Devices that affect nerve signals   Medicines - When the above methods are not enough to relieve pain, doctors can recommend medicines. Different medicines work in different ways. Some pain medicines, like opioids, are not recommended for treating chronic pain in most cases. That's because they come with serious risks. Doctors usually try other medicines first.  If your doctor suggests a medicine that seems strange, keep an open mind. Sometimes, doctors treat pain with medicines made to treat other medical problems. For example, medicines for depression can help with pain, because they work on areas of the brain that process pain. Doctors can also use medicines for seizures to treat pain, because they help with overactive nerves.   Other treatments - These might include:   Injections (shots) of numbing or pain-relieving medicines   Surgery  To find the best treatment for you:   Be open to trying new treatments and combinations of treatments. Sometimes, you have to try a few different options before you find one that works best.   Set realistic goals for your treatment. Even if you can't completely get rid of your pain, you might be able to control it enough so you can do the activities you like.  Is there anything I can do on my own to feel better? -- Yes. It can help to:   Try things like heat, cold, or massage - Depending on the cause of your pain, these things might help. Check with your doctor to make sure that it is OK for your  condition.   Practice relaxing - You can learn methods to relax your body, such as doing deep breathing exercises. Ask your doctor or nurse about these methods. Relaxing the mind can help with how the body feels pain. People can learn to quiet their pain or make it less bothersome. It can also help to make changes to improve your sleep habits.   Stay as active as possible - Walking, swimming, anju chi (a kind of martial art), or biking can all help ease muscle and joint pain. Even gentle forms of exercise are good for your health. If you are not active, your pain might get worse.  If you haven't been active for a while, start slowly. Make small increases in the intensity and amount of time you spend exercising. If exercising makes your pain worse, talk with your doctor. They might recommend a program that can help you get more active.  What medical care will I need? -- Many people need a team to help manage their care. A treatment team usually includes:   Doctors or specialists   A physical therapist   Someone trained in mental health, like a  or counselor  Your team will probably want to see you regularly:   They will ask you questions about your pain and other symptoms. They will also work with you to learn how treatment is working and make changes if needed.   They will ask you about your mood and your mental health. Depression and chronic pain can make each other worse. If you have depression, they might suggest treatment for it.  As you get better at managing your pain, you might see your team less frequently.  All topics are updated as new evidence becomes available and our peer review process is complete.  This topic retrieved from [employer] on: Mar 29, 2024.  Topic 90119 Version 18.0  Release: 32.2.4 - C32.87  © 2024 UpToDate, Inc. and/or its affiliates. All rights reserved.  Consumer Information Use and Disclaimer   Disclaimer: This generalized information is a limited summary of diagnosis,  treatment, and/or medication information. It is not meant to be comprehensive and should be used as a tool to help the user understand and/or assess potential diagnostic and treatment options. It does NOT include all information about conditions, treatments, medications, side effects, or risks that may apply to a specific patient. It is not intended to be medical advice or a substitute for the medical advice, diagnosis, or treatment of a health care provider based on the health care provider's examination and assessment of a patient's specific and unique circumstances. Patients must speak with a health care provider for complete information about their health, medical questions, and treatment options, including any risks or benefits regarding use of medications. This information does not endorse any treatments or medications as safe, effective, or approved for treating a specific patient. UpToDate, Inc. and its affiliates disclaim any warranty or liability relating to this information or the use thereof.The use of this information is governed by the Terms of Use, available at https://www.woltersEmbark Holdingsuwer.com/en/know/clinical-effectiveness-terms. 2024© UpToDate, Inc. and its affiliates and/or licensors. All rights reserved.  Copyright   © 2024 Up[employer]Date, Inc. and/or its affiliates. All rights reserved.

## 2025-03-12 NOTE — ASSESSMENT & PLAN NOTE
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 remaining follow-up plan findings as follows    All medical records reviewed and reconciled patient PDMP database reviewed to ensure compliant with the treatment plan for pain management benefits due to the fact that patient has not responded to other measures for pain control so far and severe the pain is significant and interfering with normal daily activities I believe it is reasonable and appropriate to continue the controlled substance in order to improve his ability to function in enhance quality of life the patient has signed a narcotic agreement patient should it was utilize 1 pharmacy and not receiving narcotic from any other provider patient verbalizes understanding that breaching the contract will affect future prescription decision making and critical results in the dismissal from the practice if specifically the has been explained in understood to patient patient demonstrates following informed use of appropriate medicine to analgesia increase activity explained the side effects recommended that thisto the patient patient understands that also advised that appeared to expose him order an abuse and addiction and overdose counseling provided for prevention of any overuse abuse or addiction      Orders:  •  oxyCODONE-acetaminophen (PERCOCET)  mg per tablet; Take 1 tablet by mouth every 8 (eight) hours as needed for severe pain Max Daily Amount: 3 tablets

## 2025-04-05 ENCOUNTER — RESULTS FOLLOW-UP (OUTPATIENT)
Dept: INTERNAL MEDICINE CLINIC | Facility: CLINIC | Age: 60
End: 2025-04-05

## 2025-04-05 LAB
ALBUMIN SERPL-MCNC: 4.1 G/DL (ref 3.5–5.7)
ALBUMIN/CREAT UR: NORMAL
ALP SERPL-CCNC: 85 U/L (ref 35–120)
ALT SERPL-CCNC: 16 U/L
ANION GAP SERPL CALCULATED.3IONS-SCNC: 6 MMOL/L (ref 3–11)
AST SERPL-CCNC: 15 U/L
BASOPHILS # BLD AUTO: 0.1 THOU/CMM (ref 0–0.1)
BASOPHILS NFR BLD AUTO: 1 %
BILIRUB SERPL-MCNC: 0.4 MG/DL (ref 0.2–1)
BUN SERPL-MCNC: 19 MG/DL (ref 7–28)
CALCIUM SERPL-MCNC: 8.8 MG/DL (ref 8.5–10.5)
CHLORIDE SERPL-SCNC: 104 MMOL/L (ref 100–109)
CHOLEST SERPL-MCNC: 114 MG/DL
CHOLEST/HDLC SERPL: 2.2 {RATIO}
CO2 SERPL-SCNC: 31 MMOL/L (ref 21–31)
CREAT SERPL-MCNC: 1.03 MG/DL (ref 0.53–1.3)
CREAT UR-MCNC: 99.8 MG/DL (ref 50–200)
CYTOLOGY CMNT CVX/VAG CYTO-IMP: NORMAL
DIFFERENTIAL METHOD BLD: ABNORMAL
EOSINOPHIL # BLD AUTO: 0.1 THOU/CMM (ref 0–0.5)
EOSINOPHIL NFR BLD AUTO: 3 %
ERYTHROCYTE [DISTWIDTH] IN BLOOD BY AUTOMATED COUNT: 16.3 % (ref 12–16)
EST. AVERAGE GLUCOSE BLD GHB EST-MCNC: 114 MG/DL
GFR/BSA.PRED SERPLBLD CYS-BASED-ARV: 84 ML/MIN/{1.73_M2}
GLUCOSE SERPL-MCNC: 92 MG/DL (ref 65–99)
GLUCOSE UR QL STRIP: NEGATIVE MG/DL
HBA1C MFR BLD: 5.6 %
HCT VFR BLD AUTO: 34.4 % (ref 37–48)
HDLC SERPL-MCNC: 53 MG/DL (ref 23–92)
HGB BLD-MCNC: 11 G/DL (ref 12.5–17)
HGB UR QL STRIP: NEGATIVE MG/DL
KETONES UR QL STRIP: NEGATIVE MG/DL
LDLC SERPL CALC-MCNC: 50 MG/DL
LEUKOCYTE ESTERASE UR QL STRIP: NEGATIVE /UL
LYMPHOCYTES # BLD AUTO: 1.6 THOU/CMM (ref 1–3)
LYMPHOCYTES NFR BLD AUTO: 33 %
MCH RBC QN AUTO: 23.1 PG (ref 27–36)
MCHC RBC AUTO-ENTMCNC: 32.2 G/DL (ref 32–37)
MCV RBC AUTO: 72 FL (ref 80–100)
MICROALBUMIN UR-MCNC: <0.7 MG/DL
MONOCYTES # BLD AUTO: 0.5 THOU/CMM (ref 0.3–1)
MONOCYTES NFR BLD AUTO: 10 %
NEUTROPHILS # BLD AUTO: 2.6 THOU/CMM (ref 1.8–7.8)
NEUTROPHILS NFR BLD AUTO: 53 %
NITRITE UR QL STRIP: NEGATIVE
NONHDLC SERPL-MCNC: 61 MG/DL
PH UR: 7 [PH] (ref 4.5–8)
PLATELET # BLD AUTO: 287 THOU/CMM (ref 140–350)
PMV BLD REES-ECKER: 10 FL (ref 7.5–11.3)
POTASSIUM SERPL-SCNC: 4.8 MMOL/L (ref 3.5–5.2)
PROT 24H UR-MRATE: NEGATIVE MG/DL
PROT SERPL-MCNC: 6.7 G/DL (ref 6.3–8.3)
PSA SERPL-MCNC: 0.13 NG/ML
RBC # BLD AUTO: 4.78 MILL/CMM (ref 4–5.4)
RBC #/AREA URNS HPF: NORMAL /HPF (ref 0–2)
SL AMB POCT URINE COMMENT: NORMAL
SODIUM SERPL-SCNC: 141 MMOL/L (ref 135–145)
SP GR UR: 1.02 (ref 1–1.03)
TRIGL SERPL-MCNC: 53 MG/DL
WBC # BLD AUTO: 4.8 THOU/CMM (ref 4–10.5)
WBC #/AREA URNS HPF: 0 /HPF (ref 0–5)

## 2025-04-09 ENCOUNTER — OFFICE VISIT (OUTPATIENT)
Dept: INTERNAL MEDICINE CLINIC | Facility: CLINIC | Age: 60
End: 2025-04-09
Payer: COMMERCIAL

## 2025-04-09 VITALS
HEART RATE: 100 BPM | HEIGHT: 71 IN | BODY MASS INDEX: 33.04 KG/M2 | DIASTOLIC BLOOD PRESSURE: 78 MMHG | WEIGHT: 236 LBS | SYSTOLIC BLOOD PRESSURE: 120 MMHG | OXYGEN SATURATION: 98 %

## 2025-04-09 DIAGNOSIS — N40.0 BENIGN PROSTATIC HYPERPLASIA, UNSPECIFIED WHETHER LOWER URINARY TRACT SYMPTOMS PRESENT: ICD-10-CM

## 2025-04-09 DIAGNOSIS — Z98.84 GASTRIC BYPASS STATUS FOR OBESITY: ICD-10-CM

## 2025-04-09 DIAGNOSIS — R73.03 PREDIABETES: ICD-10-CM

## 2025-04-09 DIAGNOSIS — M48.061 LUMBAR FORAMINAL STENOSIS: ICD-10-CM

## 2025-04-09 DIAGNOSIS — M17.0 PRIMARY OSTEOARTHRITIS OF BOTH KNEES: ICD-10-CM

## 2025-04-09 DIAGNOSIS — M47.816 LUMBAR SPONDYLOSIS: ICD-10-CM

## 2025-04-09 DIAGNOSIS — I10 BENIGN ESSENTIAL HYPERTENSION: Primary | ICD-10-CM

## 2025-04-09 DIAGNOSIS — G62.9 NEUROPATHY: ICD-10-CM

## 2025-04-09 DIAGNOSIS — M51.26 LUMBAR DISC HERNIATION: ICD-10-CM

## 2025-04-09 DIAGNOSIS — E78.2 MIXED HYPERLIPIDEMIA: ICD-10-CM

## 2025-04-09 DIAGNOSIS — M47.16 LUMBAR SPONDYLOSIS WITH MYELOPATHY: ICD-10-CM

## 2025-04-09 PROCEDURE — 99214 OFFICE O/P EST MOD 30 MIN: CPT | Performed by: INTERNAL MEDICINE

## 2025-04-09 RX ORDER — AMLODIPINE AND OLMESARTAN MEDOXOMIL 5; 40 MG/1; MG/1
1 TABLET ORAL DAILY
Qty: 90 TABLET | Refills: 2 | Status: SHIPPED | OUTPATIENT
Start: 2025-04-09

## 2025-04-09 RX ORDER — PREGABALIN 100 MG/1
100 CAPSULE ORAL 2 TIMES DAILY
Qty: 180 CAPSULE | Refills: 2 | Status: SHIPPED | OUTPATIENT
Start: 2025-04-09

## 2025-04-09 RX ORDER — CELECOXIB 200 MG/1
200 CAPSULE ORAL 2 TIMES DAILY
Qty: 180 CAPSULE | Refills: 1 | Status: SHIPPED | OUTPATIENT
Start: 2025-04-09

## 2025-04-09 RX ORDER — TAMSULOSIN HYDROCHLORIDE 0.4 MG/1
0.4 CAPSULE ORAL
Qty: 90 CAPSULE | Refills: 2 | Status: SHIPPED | OUTPATIENT
Start: 2025-04-09

## 2025-04-09 RX ORDER — TRIAMTERENE AND HYDROCHLOROTHIAZIDE 37.5; 25 MG/1; MG/1
1 TABLET ORAL DAILY
Qty: 90 TABLET | Refills: 2 | Status: SHIPPED | OUTPATIENT
Start: 2025-04-09

## 2025-04-09 RX ORDER — OXYCODONE AND ACETAMINOPHEN 10; 325 MG/1; MG/1
1 TABLET ORAL EVERY 8 HOURS PRN
Qty: 90 TABLET | Refills: 0 | Status: SHIPPED | OUTPATIENT
Start: 2025-04-09

## 2025-04-09 NOTE — PROGRESS NOTES
Name: Ifeanyi Puga      : 1965      MRN: [id number]  Encounter Provider: Wilmar Bradford MD  Encounter Date: 2025   Encounter department: [facility] INTERNAL MEDICINE  :  Assessment & Plan  Lumbar disc herniation  Same as under lumbar spondylosis with myelopathy  Orders:  •  celecoxib (CeleBREX) 200 mg capsule; Take 1 capsule (200 mg total) by mouth 2 (two) times a day  •  oxyCODONE-acetaminophen (PERCOCET)  mg per tablet; Take 1 tablet by mouth every 8 (eight) hours as needed for severe pain Max Daily Amount: 3 tablets    Benign essential hypertension  Blood pressure controlled asymptomatic agree continue manage medication as follow Soni 5/40 daily  Low-salt diet  Continue Maxide 37.5 mg daily  BMP reviewed as follows    Lab Results   Component Value Date    SODIUM 141 2025    K 4.8 2025     2025    CO2 31 2025    BUN 19 2025    CREATININE 1.03 2025    GLUC 92 2025    CALCIUM 8.8 2025      Orders:  •  amlodipine-olmesartan (Soni) 5-40 MG; Take 1 tablet by mouth daily  •  triamterene-hydrochlorothiazide (MAXZIDE-25) 37.5-25 mg per tablet; Take 1 tablet by mouth daily    Neuropathy  Symptoms controlled continue Lyrica 100 mg twice a day  Orders:  •  pregabalin (LYRICA) 100 mg capsule; Take 1 capsule (100 mg total) by mouth 2 (two) times a day    Benign prostatic hyperplasia, unspecified whether lower urinary tract symptoms present  Lab Results   Component Value Date    PSA 0.13 2025   PSA normal as above    Symptoms controlled    Continue med medication    Flomax 0.4 mg daily no side effects  Orders:  •  tamsulosin (FLOMAX) 0.4 mg; Take 1 capsule (0.4 mg total) by mouth daily with dinner    Lumbar foraminal stenosis  Same as under lumbar spondylosis with myelopathy  Orders:  •  oxyCODONE-acetaminophen (PERCOCET)  mg per tablet; Take 1 tablet by mouth every 8 (eight) hours as needed for severe pain Max Daily Amount: 3  tablets    Lumbar spondylosis with myelopathy  He continues to have low back pain.  Patient continues to have bilateral radiculopathy.  Pain is moderately severe.  Remains on Percocet 3-4 times a day.  No abuse no dependency no side effect.  Pain not controlled with the Tylenol.  Patient continues to have tingling and numbness in the leg.  Known case of neuropathy.  Patient wants to continue to work.  He was advised surgery wants to work for 5 more years does not want to go for surgery..    Percocet 10-3 24 times a day was refilled helping patient function clear day-to-day activities work full-time    Counseling follow-up finding as follows    All medical records reviewed and reconciled patient PDMP database reviewed to ensure compliant with the treatment plan for pain management benefits due to the fact that patient has not responded to other measures for pain control so far and severe the pain is significant and interfering with normal daily activities I believe it is reasonable and appropriate to continue the controlled substance in order to improve his ability to function in enhance quality of life the patient has signed a narcotic agreement patient should it was utilize 1 pharmacy and not receiving narcotic from any other provider patient verbalizes understanding that breaching the contract will affect future prescription decision making and critical results in the dismissal from the practice if specifically the has been explained in understood to patient patient demonstrates following informed use of appropriate medicine to analgesia increase activity explained the side effects recommended that thisto the patient patient understands that also advised that appeared to expose him order an abuse and addiction and overdose counseling provided for prevention of any overuse abuse or addiction   Orders:  •  oxyCODONE-acetaminophen (PERCOCET)  mg per tablet; Take 1 tablet by mouth every 8 (eight) hours as needed  for severe pain Max Daily Amount: 3 tablets    Gastric bypass status for obesity  For now BMI 32.92 associated with hypertension prediabetes counseled on cut down calorie portion diet change    CBC reviewed anemia due to the possible iron deficiency    Lab Results   Component Value Date    WBC 4.8 04/05/2025    HGB 11.0 (L) 04/05/2025    HCT 34.4 (L) 04/05/2025    MCV 72 (L) 04/05/2025     04/05/2025       Will do next blood work will do gastric bypass panel    For now advised oral iron cannot tolerate twice a day advised to take ferrous sulfate once a day will monitor closely         Mixed hyperlipidemia  Lab Results   Component Value Date    LDLCALC 50 04/05/2025   LDL controlled        The LDL very well-controlled with low-fat low-cholesterol diet    Continue low-fat low-cholesterol diet will monitor       Prediabetes  Lab Results   Component Value Date    HGBA1C 5.6 04/05/2025   BMI reviewed as follows previous BMI 5.8 now much improved diabetic diet continue diet exercise lose weight       Lumbar spondylosis  disc herniation, foraminal stenosis, osteoarthritis of the both knee, lumbar spondylosis.   He continues to have low back pain.  Patient continues to have bilateral radiculopathy.  Pain is moderately severe.  Remains on Percocet 3-4 times a day.  No abuse no dependency no side effect.  Pain not controlled with the Tylenol.  Patient continues to have tingling and numbness in the leg.  Known case of neuropathy.  Patient wants to continue to work.  He was advised surgery wants to work for 5 more years does not want to go for surgery..    Above reviewed the Percocet 10/325 4 times a day refilled helping patient function care of day-to-day activities work full-time the further counseling same is under lumbar disc herniation              History of Present Illness   Came in follow-up chronic medical condition list reviewed diagnosis mainly reviewed the lab all the labs reviewed discussed at length and  management chronic interval pain on Percocet 10-3 25 4 times a day was refilled for all the detail refer to assessment plan visit diagnosis      Review of Systems   Constitutional:  Positive for activity change. Negative for appetite change, chills, diaphoresis, fatigue, fever and unexpected weight change.   HENT:  Negative for congestion, dental problem, drooling, ear discharge, ear pain, facial swelling, hearing loss, mouth sores, nosebleeds, postnasal drip, rhinorrhea, sinus pressure, sneezing, sore throat, tinnitus, trouble swallowing and voice change.    Eyes:  Negative for photophobia, pain, discharge, redness, itching and visual disturbance.   Respiratory:  Negative for apnea, cough, choking, chest tightness, shortness of breath, wheezing and stridor.    Cardiovascular:  Negative for chest pain, palpitations and leg swelling.   Gastrointestinal:  Negative for abdominal distention, abdominal pain, anal bleeding, blood in stool, constipation, diarrhea, nausea, rectal pain and vomiting.   Endocrine: Negative for cold intolerance, heat intolerance, polydipsia, polyphagia and polyuria.   Genitourinary:  Negative for decreased urine volume, difficulty urinating, dysuria, enuresis, flank pain, frequency, genital sores, hematuria and urgency.   Musculoskeletal:  Positive for arthralgias, back pain, gait problem, joint swelling, myalgias and neck pain. Negative for neck stiffness.   Skin:  Negative for color change, pallor, rash and wound.   Allergic/Immunologic: Negative.  Negative for environmental allergies, food allergies and immunocompromised state.   Neurological:  Negative for dizziness, tremors, seizures, syncope, facial asymmetry, speech difficulty, weakness, light-headedness, numbness and headaches.   Psychiatric/Behavioral:  Negative for agitation, behavioral problems, confusion, decreased concentration, dysphoric mood, hallucinations, self-injury, sleep disturbance and suicidal ideas. The patient is not  "nervous/anxious and is not hyperactive.        Objective   /78   Pulse 100   Ht 5' 11\" (1.803 m)   Wt 107 kg (236 lb)   SpO2 98%   BMI 32.92 kg/m²      Physical Exam  Vitals and nursing note reviewed.   Constitutional:       General: He is not in acute distress.     Appearance: He is well-developed. He is not ill-appearing, toxic-appearing or diaphoretic.   HENT:      Head: Normocephalic and atraumatic.      Right Ear: External ear normal.      Left Ear: External ear normal.      Nose: Nose normal.      Mouth/Throat:      Pharynx: No oropharyngeal exudate.   Eyes:      General: Lids are normal. Lids are everted, no foreign bodies appreciated. No scleral icterus.        Right eye: No discharge.         Left eye: No discharge.      Conjunctiva/sclera: Conjunctivae normal.      Pupils: Pupils are equal, round, and reactive to light.   Neck:      Thyroid: No thyromegaly.      Vascular: Normal carotid pulses. No carotid bruit, hepatojugular reflux or JVD.      Trachea: No tracheal tenderness or tracheal deviation.   Cardiovascular:      Rate and Rhythm: Normal rate and regular rhythm.      Pulses: Normal pulses.      Heart sounds: Normal heart sounds. No murmur heard.     No friction rub. No gallop.   Pulmonary:      Effort: Pulmonary effort is normal. No respiratory distress.      Breath sounds: Normal breath sounds. No stridor. No wheezing or rales.   Chest:      Chest wall: No tenderness.   Abdominal:      General: Bowel sounds are normal. There is no distension.      Palpations: Abdomen is soft. There is no mass.      Tenderness: There is no abdominal tenderness. There is no guarding or rebound.   Musculoskeletal:         General: No tenderness or deformity. Normal range of motion.      Cervical back: Normal range of motion and neck supple. No edema, erythema or rigidity. No spinous process tenderness or muscular tenderness. Normal range of motion.   Lymphadenopathy:      Head:      Right side of head: No " submental, submandibular, tonsillar, preauricular or posterior auricular adenopathy.      Left side of head: No submental, submandibular, tonsillar, preauricular, posterior auricular or occipital adenopathy.      Cervical: No cervical adenopathy.      Right cervical: No superficial, deep or posterior cervical adenopathy.     Left cervical: No superficial, deep or posterior cervical adenopathy.      Upper Body:      Right upper body: No pectoral adenopathy.      Left upper body: No pectoral adenopathy.   Skin:     General: Skin is warm and dry.      Coloration: Skin is not pale.      Findings: No erythema or rash.   Neurological:      General: No focal deficit present.      Mental Status: He is alert and oriented to person, place, and time.      Cranial Nerves: No cranial nerve deficit.      Sensory: No sensory deficit.      Motor: No tremor, abnormal muscle tone or seizure activity.      Coordination: Coordination normal.      Gait: Gait abnormal.      Deep Tendon Reflexes: Reflexes are normal and symmetric. Reflexes normal.   Psychiatric:         Behavior: Behavior normal.         Thought Content: Thought content normal.         Judgment: Judgment normal.

## 2025-04-09 NOTE — ASSESSMENT & PLAN NOTE
Symptoms controlled continue Lyrica 100 mg twice a day  Orders:  •  pregabalin (LYRICA) 100 mg capsule; Take 1 capsule (100 mg total) by mouth 2 (two) times a day

## 2025-04-09 NOTE — ASSESSMENT & PLAN NOTE
Same as under lumbar spondylosis with myelopathy  Orders:  •  celecoxib (CeleBREX) 200 mg capsule; Take 1 capsule (200 mg total) by mouth 2 (two) times a day  •  oxyCODONE-acetaminophen (PERCOCET)  mg per tablet; Take 1 tablet by mouth every 8 (eight) hours as needed for severe pain Max Daily Amount: 3 tablets

## 2025-04-09 NOTE — ASSESSMENT & PLAN NOTE
Blood pressure controlled asymptomatic agree continue manage medication as follow Soni 5/40 daily  Low-salt diet  Continue Maxide 37.5 mg daily  BMP reviewed as follows    Lab Results   Component Value Date    SODIUM 141 04/05/2025    K 4.8 04/05/2025     04/05/2025    CO2 31 04/05/2025    BUN 19 04/05/2025    CREATININE 1.03 04/05/2025    GLUC 92 04/05/2025    CALCIUM 8.8 04/05/2025      Orders:  •  amlodipine-olmesartan (Soni) 5-40 MG; Take 1 tablet by mouth daily  •  triamterene-hydrochlorothiazide (MAXZIDE-25) 37.5-25 mg per tablet; Take 1 tablet by mouth daily

## 2025-04-09 NOTE — ASSESSMENT & PLAN NOTE
Lab Results   Component Value Date    LDLCALC 50 04/05/2025   LDL controlled        The LDL very well-controlled with low-fat low-cholesterol diet    Continue low-fat low-cholesterol diet will monitor

## 2025-04-09 NOTE — PATIENT INSTRUCTIONS
"Patient Education     Chronic pain   The Basics   Written by the doctors and editors at [facility]   What is chronic pain? -- This is pain that lasts longer than 3 to 6 months. In many cases, this means that pain continues even after the injury or condition that first caused it has healed.  Pain can affect the body in different ways. For example, pain can be:   An ache deep inside the muscle or bone   Stabbing or shooting, often with tingling or numbness   Dull and throbbing  People who have chronic pain might have a hard time doing their usual activities, such as bathing or dressing. This can lead to depression and anxiety. It can also cause problems with sleep.  What causes chronic pain? -- It is not always clear. Sometimes, it is caused by an ongoing medical problem, such as arthritis or nerve damage from diabetes. But doctors cannot always find the cause of chronic pain.  In some cases, people with chronic pain must accept that their pain will never be explained. This means that they have to work with their health care team to address the pain, even if they don't know its cause.  Will I need tests? -- Your doctor might do tests to figure out the cause of your pain. For example, you might get:   Blood tests - These can check for infection, signs of inflammation, or diseases that can cause pain.   X-rays or other imaging tests - Imaging tests create pictures of the inside of the body. They can check for bone fractures, joint damage, cancer, or other changes in your body that could cause pain.   Nerve tests - These are ways to check whether the nerves are working normally.  However, tests cannot always show the cause of pain. Scientists think that in some people, the pain signals in the brain stop working normally. The signals get \"stuck\" in the on position, even when the source of pain is gone.  How is chronic pain treated? -- There are many different ways to manage chronic pain. Most people need to try different " combinations. The right approach for you depends on your pain as well as your lifestyle and preferences.  Treatment options include:   Non-medicine techniques - Doctors recommend first trying to manage pain without medicines. This can involve lots of different things, such as:   Working with a counselor or therapist   Physical therapy or an exercise program   Lifestyle and behavior changes to improve your mood, sleep, diet, and stress level   Acupuncture   Massage   Ice or heat   Devices that affect nerve signals   Medicines - When the above methods are not enough to relieve pain, doctors can recommend medicines. Different medicines work in different ways. Some pain medicines, like opioids, are not recommended for treating chronic pain in most cases. That's because they come with serious risks. Doctors usually try other medicines first.  If your doctor suggests a medicine that seems strange, keep an open mind. Sometimes, doctors treat pain with medicines made to treat other medical problems. For example, medicines for depression can help with pain, because they work on areas of the brain that process pain. Doctors can also use medicines for seizures to treat pain, because they help with overactive nerves.   Other treatments - These might include:   Injections (shots) of numbing or pain-relieving medicines   Surgery  To find the best treatment for you:   Be open to trying new treatments and combinations of treatments. Sometimes, you have to try a few different options before you find one that works best.   Set realistic goals for your treatment. Even if you can't completely get rid of your pain, you might be able to control it enough so you can do the activities you like.  Is there anything I can do on my own to feel better? -- Yes. It can help to:   Try things like heat, cold, or massage - Depending on the cause of your pain, these things might help. Check with your doctor to make sure that it is OK for your  condition.   Practice relaxing - You can learn methods to relax your body, such as doing deep breathing exercises. Ask your doctor or nurse about these methods. Relaxing the mind can help with how the body feels pain. People can learn to quiet their pain or make it less bothersome. It can also help to make changes to improve your sleep habits.   Stay as active as possible - Walking, swimming, anju chi (a kind of martial art), or biking can all help ease muscle and joint pain. Even gentle forms of exercise are good for your health. If you are not active, your pain might get worse.  If you haven't been active for a while, start slowly. Make small increases in the intensity and amount of time you spend exercising. If exercising makes your pain worse, talk with your doctor. They might recommend a program that can help you get more active.  What medical care will I need? -- Many people need a team to help manage their care. A treatment team usually includes:   Doctors or specialists   A physical therapist   Someone trained in mental health, like a  or counselor  Your team will probably want to see you regularly:   They will ask you questions about your pain and other symptoms. They will also work with you to learn how treatment is working and make changes if needed.   They will ask you about your mood and your mental health. Depression and chronic pain can make each other worse. If you have depression, they might suggest treatment for it.  As you get better at managing your pain, you might see your team less frequently.  All topics are updated as new evidence becomes available and our peer review process is complete.  This topic retrieved from [employer] on: Mar 29, 2024.  Topic 04098 Version 18.0  Release: 32.2.4 - C32.87  © 2024 UpToDate, Inc. and/or its affiliates. All rights reserved.  Consumer Information Use and Disclaimer   Disclaimer: This generalized information is a limited summary of diagnosis,  treatment, and/or medication information. It is not meant to be comprehensive and should be used as a tool to help the user understand and/or assess potential diagnostic and treatment options. It does NOT include all information about conditions, treatments, medications, side effects, or risks that may apply to a specific patient. It is not intended to be medical advice or a substitute for the medical advice, diagnosis, or treatment of a health care provider based on the health care provider's examination and assessment of a patient's specific and unique circumstances. Patients must speak with a health care provider for complete information about their health, medical questions, and treatment options, including any risks or benefits regarding use of medications. This information does not endorse any treatments or medications as safe, effective, or approved for treating a specific patient. Up[employer]Date, Inc. and its affiliates disclaim any warranty or liability relating to this information or the use thereof.The use of this information is governed by the Terms of Use, available at https://www.woltersInway Studiosuwer.com/en/know/clinical-effectiveness-terms. 2024© UpToDate, Inc. and its affiliates and/or licensors. All rights reserved.  Copyright   © 2024 Up[employer]Date, Inc. and/or its affiliates. All rights reserved.

## 2025-04-09 NOTE — ASSESSMENT & PLAN NOTE
For now BMI 32.92 associated with hypertension prediabetes counseled on cut down calorie portion diet change    CBC reviewed anemia due to the possible iron deficiency    Lab Results   Component Value Date    WBC 4.8 04/05/2025    HGB 11.0 (L) 04/05/2025    HCT 34.4 (L) 04/05/2025    MCV 72 (L) 04/05/2025     04/05/2025       Will do next blood work will do gastric bypass panel    For now advised oral iron cannot tolerate twice a day advised to take ferrous sulfate once a day will monitor closely

## 2025-04-09 NOTE — ASSESSMENT & PLAN NOTE
Lab Results   Component Value Date    HGBA1C 5.6 04/05/2025   BMI reviewed as follows previous BMI 5.8 now much improved diabetic diet continue diet exercise lose weight

## 2025-04-09 NOTE — ASSESSMENT & PLAN NOTE
disc herniation, foraminal stenosis, osteoarthritis of the both knee, lumbar spondylosis.   He continues to have low back pain.  Patient continues to have bilateral radiculopathy.  Pain is moderately severe.  Remains on Percocet 3-4 times a day.  No abuse no dependency no side effect.  Pain not controlled with the Tylenol.  Patient continues to have tingling and numbness in the leg.  Known case of neuropathy.  Patient wants to continue to work.  He was advised surgery wants to work for 5 more years does not want to go for surgery..    Above reviewed the Percocet 10/325 4 times a day refilled helping patient function care of day-to-day activities work full-time the further counseling same is under lumbar disc herniation

## 2025-04-09 NOTE — ASSESSMENT & PLAN NOTE
Lab Results   Component Value Date    PSA 0.13 04/05/2025   PSA normal as above    Symptoms controlled    Continue med medication    Flomax 0.4 mg daily no side effects  Orders:  •  tamsulosin (FLOMAX) 0.4 mg; Take 1 capsule (0.4 mg total) by mouth daily with dinner

## 2025-04-09 NOTE — ASSESSMENT & PLAN NOTE
He continues to have low back pain.  Patient continues to have bilateral radiculopathy.  Pain is moderately severe.  Remains on Percocet 3-4 times a day.  No abuse no dependency no side effect.  Pain not controlled with the Tylenol.  Patient continues to have tingling and numbness in the leg.  Known case of neuropathy.  Patient wants to continue to work.  He was advised surgery wants to work for 5 more years does not want to go for surgery..    Percocet 10-3 24 times a day was refilled helping patient function clear day-to-day activities work full-time    Counseling follow-up finding as follows    All medical records reviewed and reconciled patient PDMP database reviewed to ensure compliant with the treatment plan for pain management benefits due to the fact that patient has not responded to other measures for pain control so far and severe the pain is significant and interfering with normal daily activities I believe it is reasonable and appropriate to continue the controlled substance in order to improve his ability to function in enhance quality of life the patient has signed a narcotic agreement patient should it was utilize 1 pharmacy and not receiving narcotic from any other provider patient verbalizes understanding that breaching the contract will affect future prescription decision making and critical results in the dismissal from the practice if specifically the has been explained in understood to patient patient demonstrates following informed use of appropriate medicine to analgesia increase activity explained the side effects recommended that thisto the patient patient understands that also advised that appeared to expose him order an abuse and addiction and overdose counseling provided for prevention of any overuse abuse or addiction   Orders:  •  oxyCODONE-acetaminophen (PERCOCET)  mg per tablet; Take 1 tablet by mouth every 8 (eight) hours as needed for severe pain Max Daily Amount: 3 tablets

## 2025-05-07 ENCOUNTER — OFFICE VISIT (OUTPATIENT)
Dept: INTERNAL MEDICINE CLINIC | Facility: CLINIC | Age: 60
End: 2025-05-07
Payer: COMMERCIAL

## 2025-05-07 VITALS
DIASTOLIC BLOOD PRESSURE: 80 MMHG | SYSTOLIC BLOOD PRESSURE: 122 MMHG | BODY MASS INDEX: 32.06 KG/M2 | WEIGHT: 229 LBS | HEIGHT: 71 IN | HEART RATE: 79 BPM | OXYGEN SATURATION: 99 %

## 2025-05-07 DIAGNOSIS — M47.16 LUMBAR SPONDYLOSIS WITH MYELOPATHY: Primary | ICD-10-CM

## 2025-05-07 DIAGNOSIS — M48.061 LUMBAR FORAMINAL STENOSIS: ICD-10-CM

## 2025-05-07 DIAGNOSIS — M51.26 LUMBAR DISC HERNIATION: ICD-10-CM

## 2025-05-07 PROCEDURE — 99213 OFFICE O/P EST LOW 20 MIN: CPT | Performed by: INTERNAL MEDICINE

## 2025-05-07 RX ORDER — OXYCODONE AND ACETAMINOPHEN 10; 325 MG/1; MG/1
1 TABLET ORAL EVERY 8 HOURS PRN
Qty: 90 TABLET | Refills: 0 | Status: SHIPPED | OUTPATIENT
Start: 2025-05-07

## 2025-05-07 NOTE — ASSESSMENT & PLAN NOTE
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 no side effects denied NSAID overuse abuse addiction    Counseling done as follows    All medical records reviewed and reconciled patient PDMP database reviewed to ensure compliant with the treatment plan for pain management benefits due to the fact that patient has not responded to other measures for pain control so far and severe the pain is significant and interfering with normal daily activities I believe it is reasonable and appropriate to continue the controlled substance in order to improve his ability to function in enhance quality of life the patient has signed a narcotic agreement patient should it was utilize 1 pharmacy and not receiving narcotic from any other provider patient verbalizes understanding that breaching the contract will affect future prescription decision making and critical results in the dismissal from the practice if specifically the has been explained in understood to patient patient demonstrates following informed use of appropriate medicine to analgesia increase activity explained the side effects recommended that thisto the patient patient understands that also advised that appeared to expose him order an abuse and addiction and overdose counseling provided for prevention of any overuse abuse or addiction   Orders:  •  oxyCODONE-acetaminophen (PERCOCET)  mg per tablet; Take 1 tablet by mouth every 8 (eight) hours as needed for severe pain Max Daily Amount: 3 tablets

## 2025-05-07 NOTE — PATIENT INSTRUCTIONS
"Patient Education     Chronic pain   The Basics   Written by the doctors and editors at [facility]   What is chronic pain? -- This is pain that lasts longer than 3 to 6 months. In many cases, this means that pain continues even after the injury or condition that first caused it has healed.  Pain can affect the body in different ways. For example, pain can be:   An ache deep inside the muscle or bone   Stabbing or shooting, often with tingling or numbness   Dull and throbbing  People who have chronic pain might have a hard time doing their usual activities, such as bathing or dressing. This can lead to depression and anxiety. It can also cause problems with sleep.  What causes chronic pain? -- It is not always clear. Sometimes, it is caused by an ongoing medical problem, such as arthritis or nerve damage from diabetes. But doctors cannot always find the cause of chronic pain.  In some cases, people with chronic pain must accept that their pain will never be explained. This means that they have to work with their health care team to address the pain, even if they don't know its cause.  Will I need tests? -- Your doctor might do tests to figure out the cause of your pain. For example, you might get:   Blood tests - These can check for infection, signs of inflammation, or diseases that can cause pain.   X-rays or other imaging tests - Imaging tests create pictures of the inside of the body. They can check for bone fractures, joint damage, cancer, or other changes in your body that could cause pain.   Nerve tests - These are ways to check whether the nerves are working normally.  However, tests cannot always show the cause of pain. Scientists think that in some people, the pain signals in the brain stop working normally. The signals get \"stuck\" in the on position, even when the source of pain is gone.  How is chronic pain treated? -- There are many different ways to manage chronic pain. Most people need to try different " combinations. The right approach for you depends on your pain as well as your lifestyle and preferences.  Treatment options include:   Non-medicine techniques - Doctors recommend first trying to manage pain without medicines. This can involve lots of different things, such as:   Working with a counselor or therapist   Physical therapy or an exercise program   Lifestyle and behavior changes to improve your mood, sleep, diet, and stress level   Acupuncture   Massage   Ice or heat   Devices that affect nerve signals   Medicines - When the above methods are not enough to relieve pain, doctors can recommend medicines. Different medicines work in different ways. Some pain medicines, like opioids, are not recommended for treating chronic pain in most cases. That's because they come with serious risks. Doctors usually try other medicines first.  If your doctor suggests a medicine that seems strange, keep an open mind. Sometimes, doctors treat pain with medicines made to treat other medical problems. For example, medicines for depression can help with pain, because they work on areas of the brain that process pain. Doctors can also use medicines for seizures to treat pain, because they help with overactive nerves.   Other treatments - These might include:   Injections (shots) of numbing or pain-relieving medicines   Surgery  To find the best treatment for you:   Be open to trying new treatments and combinations of treatments. Sometimes, you have to try a few different options before you find one that works best.   Set realistic goals for your treatment. Even if you can't completely get rid of your pain, you might be able to control it enough so you can do the activities you like.  Is there anything I can do on my own to feel better? -- Yes. It can help to:   Try things like heat, cold, or massage - Depending on the cause of your pain, these things might help. Check with your doctor to make sure that it is OK for your  condition.   Practice relaxing - You can learn methods to relax your body, such as doing deep breathing exercises. Ask your doctor or nurse about these methods. Relaxing the mind can help with how the body feels pain. People can learn to quiet their pain or make it less bothersome. It can also help to make changes to improve your sleep habits.   Stay as active as possible - Walking, swimming, anju chi (a kind of martial art), or biking can all help ease muscle and joint pain. Even gentle forms of exercise are good for your health. If you are not active, your pain might get worse.  If you haven't been active for a while, start slowly. Make small increases in the intensity and amount of time you spend exercising. If exercising makes your pain worse, talk with your doctor. They might recommend a program that can help you get more active.  What medical care will I need? -- Many people need a team to help manage their care. A treatment team usually includes:   Doctors or specialists   A physical therapist   Someone trained in mental health, like a  or counselor  Your team will probably want to see you regularly:   They will ask you questions about your pain and other symptoms. They will also work with you to learn how treatment is working and make changes if needed.   They will ask you about your mood and your mental health. Depression and chronic pain can make each other worse. If you have depression, they might suggest treatment for it.  As you get better at managing your pain, you might see your team less frequently.  All topics are updated as new evidence becomes available and our peer review process is complete.  This topic retrieved from [employer] on: Mar 29, 2024.  Topic 87427 Version 18.0  Release: 32.2.4 - C32.87  © 2024 UpToDate, Inc. and/or its affiliates. All rights reserved.  Consumer Information Use and Disclaimer   Disclaimer: This generalized information is a limited summary of diagnosis,  treatment, and/or medication information. It is not meant to be comprehensive and should be used as a tool to help the user understand and/or assess potential diagnostic and treatment options. It does NOT include all information about conditions, treatments, medications, side effects, or risks that may apply to a specific patient. It is not intended to be medical advice or a substitute for the medical advice, diagnosis, or treatment of a health care provider based on the health care provider's examination and assessment of a patient's specific and unique circumstances. Patients must speak with a health care provider for complete information about their health, medical questions, and treatment options, including any risks or benefits regarding use of medications. This information does not endorse any treatments or medications as safe, effective, or approved for treating a specific patient. UpToDate, Inc. and its affiliates disclaim any warranty or liability relating to this information or the use thereof.The use of this information is governed by the Terms of Use, available at https://www.woltersOktalogicuwer.com/en/know/clinical-effectiveness-terms. 2024© UpToDate, Inc. and its affiliates and/or licensors. All rights reserved.  Copyright   © 2024 UpToDate, Inc. and/or its affiliates. All rights reserved.

## 2025-05-07 NOTE — PROGRESS NOTES
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 no side effects denied NSAID overuse abuse addiction    Counseling done as follows    All medical records reviewed and reconciled patient PDMP database reviewed to ensure compliant with the treatment plan for pain management benefits due to the fact that patient has not responded to other measures for pain control so far and severe the pain is significant and interfering with normal daily activities I believe it is reasonable and appropriate to continue the controlled substance in order to improve his ability to function in enhance quality of life the patient has signed a narcotic agreement patient should it was utilize 1 pharmacy and not receiving narcotic from any other provider patient verbalizes understanding that breaching the contract will affect future prescription decision making and critical results in the dismissal from the practice if specifically the has been explained in understood to patient patient demonstrates following informed use of appropriate medicine to analgesia increase activity explained the side effects recommended that thisto the patient patient understands that also advised that appeared to expose him order an abuse and addiction and overdose counseling provided for prevention of any overuse abuse or addiction   Orders:  •  oxyCODONE-acetaminophen (PERCOCET)  mg per tablet; Take 1 tablet by mouth every 8 (eight) hours as needed for severe pain Max Daily Amount: 3 tablets    Lumbar foraminal stenosis    Orders:  •   oxyCODONE-acetaminophen (PERCOCET)  mg per tablet; Take 1 tablet by mouth every 8 (eight) hours as needed for severe pain Max Daily Amount: 3 tablets    Lumbar spondylosis with myelopathy  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 remaining follow-up plan finding as follows    All medical records reviewed and reconciled patient PDMP database reviewed to ensure compliant with the treatment plan for pain management benefits due to the fact that patient has not responded to other measures for pain control so far and severe the pain is significant and interfering with normal daily activities I believe it is reasonable and appropriate to continue the controlled substance in order to improve his ability to function in enhance quality of life the patient has signed a narcotic agreement patient should it was utilize 1 pharmacy and not receiving narcotic from any other provider patient verbalizes understanding that breaching the contract will affect future prescription decision making and critical results in the dismissal from the practice if specifically the has been explained in understood to patient patient demonstrates following informed use of appropriate medicine to analgesia increase activity explained the side effects recommended that thisto the patient patient understands that also advised that appeared to expose him order an abuse and addiction and overdose counseling provided for prevention of any overuse abuse or addiction   Orders:  •  oxyCODONE-acetaminophen (PERCOCET)  mg per tablet; Take 1 tablet by mouth every 8 (eight) hours as needed for severe pain Max Daily Amount: 3 tablets           History of Present Illness   Came in for follow-up and management of chronic interval pain and refill for Percocet 10/325 every 8 hours was refilled and  review of the lab all the labs reviewed discussed at length no new symptoms no chest pain difficulty breathing overall health unchanged since the last visit      Review of Systems   Constitutional:  Positive for activity change. Negative for appetite change, chills, diaphoresis, fatigue, fever and unexpected weight change.   HENT:  Negative for congestion, dental problem, drooling, ear discharge, ear pain, facial swelling, hearing loss, mouth sores, nosebleeds, postnasal drip, rhinorrhea, sinus pressure, sneezing, sore throat, tinnitus, trouble swallowing and voice change.    Eyes:  Negative for photophobia, pain, discharge, redness, itching and visual disturbance.   Respiratory:  Negative for apnea, cough, choking, chest tightness, shortness of breath, wheezing and stridor.    Cardiovascular:  Negative for chest pain, palpitations and leg swelling.   Gastrointestinal:  Negative for abdominal distention, abdominal pain, anal bleeding, blood in stool, constipation, diarrhea, nausea, rectal pain and vomiting.   Endocrine: Negative for cold intolerance, heat intolerance, polydipsia, polyphagia and polyuria.   Genitourinary:  Negative for decreased urine volume, difficulty urinating, dysuria, enuresis, flank pain, frequency, genital sores, hematuria and urgency.   Musculoskeletal:  Positive for arthralgias, back pain, gait problem and joint swelling. Negative for neck stiffness.   Skin:  Negative for color change, pallor, rash and wound.   Allergic/Immunologic: Negative.  Negative for environmental allergies, food allergies and immunocompromised state.   Neurological:  Negative for dizziness, tremors, seizures, syncope, facial asymmetry, speech difficulty, weakness, light-headedness, numbness and headaches.   Psychiatric/Behavioral:  Negative for agitation, behavioral problems, confusion, decreased concentration, dysphoric mood, hallucinations, self-injury, sleep disturbance and suicidal ideas. The patient is not  "nervous/anxious and is not hyperactive.        Objective   /80   Pulse 79   Ht 5' 11\" (1.803 m)   Wt 104 kg (229 lb)   SpO2 99%   BMI 31.94 kg/m²      Physical Exam  Vitals and nursing note reviewed.   Constitutional:       General: He is not in acute distress.     Appearance: He is well-developed. He is not ill-appearing, toxic-appearing or diaphoretic.   HENT:      Head: Normocephalic and atraumatic.      Right Ear: External ear normal.      Left Ear: External ear normal.      Nose: Nose normal.      Mouth/Throat:      Pharynx: No oropharyngeal exudate.   Eyes:      General: Lids are normal. Lids are everted, no foreign bodies appreciated. No scleral icterus.        Right eye: No discharge.         Left eye: No discharge.      Conjunctiva/sclera: Conjunctivae normal.      Pupils: Pupils are equal, round, and reactive to light.   Neck:      Thyroid: No thyromegaly.      Vascular: Normal carotid pulses. No carotid bruit, hepatojugular reflux or JVD.      Trachea: No tracheal tenderness or tracheal deviation.   Cardiovascular:      Rate and Rhythm: Normal rate and regular rhythm.      Pulses: Normal pulses.      Heart sounds: Normal heart sounds. No murmur heard.     No friction rub. No gallop.   Pulmonary:      Effort: Pulmonary effort is normal. No respiratory distress.      Breath sounds: Normal breath sounds. No stridor. No wheezing or rales.   Chest:      Chest wall: No tenderness.   Abdominal:      General: Bowel sounds are normal. There is no distension.      Palpations: Abdomen is soft. There is no mass.      Tenderness: There is no abdominal tenderness. There is no guarding or rebound.   Musculoskeletal:         General: No tenderness or deformity. Normal range of motion.      Cervical back: Normal range of motion and neck supple. No edema, erythema or rigidity. No spinous process tenderness or muscular tenderness. Normal range of motion.   Lymphadenopathy:      Head:      Right side of head: No " submental, submandibular, tonsillar, preauricular or posterior auricular adenopathy.      Left side of head: No submental, submandibular, tonsillar, preauricular, posterior auricular or occipital adenopathy.      Cervical: No cervical adenopathy.      Right cervical: No superficial, deep or posterior cervical adenopathy.     Left cervical: No superficial, deep or posterior cervical adenopathy.      Upper Body:      Right upper body: No pectoral adenopathy.      Left upper body: No pectoral adenopathy.   Skin:     General: Skin is warm and dry.      Coloration: Skin is not pale.      Findings: No erythema or rash.   Neurological:      General: No focal deficit present.      Mental Status: He is alert and oriented to person, place, and time.      Cranial Nerves: No cranial nerve deficit.      Sensory: No sensory deficit.      Motor: No tremor, abnormal muscle tone or seizure activity.      Coordination: Coordination normal.      Gait: Gait abnormal.      Deep Tendon Reflexes: Reflexes are normal and symmetric. Reflexes normal.   Psychiatric:         Behavior: Behavior normal.         Thought Content: Thought content normal.         Judgment: Judgment normal.

## 2025-05-07 NOTE — ASSESSMENT & PLAN NOTE
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 remaining follow-up plan finding as follows    All medical records reviewed and reconciled patient PDMP database reviewed to ensure compliant with the treatment plan for pain management benefits due to the fact that patient has not responded to other measures for pain control so far and severe the pain is significant and interfering with normal daily activities I believe it is reasonable and appropriate to continue the controlled substance in order to improve his ability to function in enhance quality of life the patient has signed a narcotic agreement patient should it was utilize 1 pharmacy and not receiving narcotic from any other provider patient verbalizes understanding that breaching the contract will affect future prescription decision making and critical results in the dismissal from the practice if specifically the has been explained in understood to patient patient demonstrates following informed use of appropriate medicine to analgesia increase activity explained the side effects recommended that thisto the patient patient understands that also advised that appeared to expose him order an abuse and addiction and overdose counseling provided for prevention of any overuse abuse or addiction   Orders:  •  oxyCODONE-acetaminophen (PERCOCET)  mg per tablet; Take 1 tablet by mouth every 8 (eight) hours as needed for severe pain Max Daily Amount: 3 tablets

## 2025-05-27 ENCOUNTER — TELEPHONE (OUTPATIENT)
Age: 60
End: 2025-05-27

## 2025-05-27 ENCOUNTER — TELEPHONE (OUTPATIENT)
Dept: INTERNAL MEDICINE CLINIC | Facility: CLINIC | Age: 60
End: 2025-05-27

## 2025-05-27 ENCOUNTER — RA CDI HCC (OUTPATIENT)
Dept: OTHER | Facility: HOSPITAL | Age: 60
End: 2025-05-27

## 2025-05-27 NOTE — TELEPHONE ENCOUNTER
Patient's wife called requesting to speak with Diana in regards to patient's medications. Please have Diana call wife back today @ [phone]

## 2025-05-28 ENCOUNTER — OFFICE VISIT (OUTPATIENT)
Dept: INTERNAL MEDICINE CLINIC | Facility: CLINIC | Age: 60
End: 2025-05-28
Payer: COMMERCIAL

## 2025-05-28 VITALS
SYSTOLIC BLOOD PRESSURE: 130 MMHG | DIASTOLIC BLOOD PRESSURE: 80 MMHG | OXYGEN SATURATION: 97 % | HEART RATE: 82 BPM | BODY MASS INDEX: 31.92 KG/M2 | WEIGHT: 228 LBS | HEIGHT: 71 IN

## 2025-05-28 DIAGNOSIS — F32.A ANXIETY AND DEPRESSION: ICD-10-CM

## 2025-05-28 DIAGNOSIS — M48.061 LUMBAR FORAMINAL STENOSIS: ICD-10-CM

## 2025-05-28 DIAGNOSIS — M47.16 LUMBAR SPONDYLOSIS WITH MYELOPATHY: Primary | ICD-10-CM

## 2025-05-28 DIAGNOSIS — M51.26 LUMBAR DISC HERNIATION: ICD-10-CM

## 2025-05-28 DIAGNOSIS — F41.9 ANXIETY AND DEPRESSION: ICD-10-CM

## 2025-05-28 PROCEDURE — 99213 OFFICE O/P EST LOW 20 MIN: CPT | Performed by: INTERNAL MEDICINE

## 2025-05-28 RX ORDER — DULOXETIN HYDROCHLORIDE 30 MG/1
30 CAPSULE, DELAYED RELEASE ORAL DAILY
Qty: 90 CAPSULE | Refills: 3 | Status: SHIPPED | OUTPATIENT
Start: 2025-05-28

## 2025-05-28 RX ORDER — OXYCODONE AND ACETAMINOPHEN 10; 325 MG/1; MG/1
1 TABLET ORAL EVERY 8 HOURS PRN
Qty: 90 TABLET | Refills: 0 | Status: SHIPPED | OUTPATIENT
Start: 2025-05-28

## 2025-05-28 NOTE — PROGRESS NOTES
Name: Ifeanyi Puga      : 1965      MRN: [id number]  Encounter Provider: Wilmar Bradford MD  Encounter Date: 2025   Encounter department: 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 no side effects denied NSAID overuse abuse addiction    Counseling done as follows    All medical records reviewed and reconciled patient PDMP database reviewed to ensure compliant with the treatment plan for pain management benefits due to the fact that patient has not responded to other measures for pain control so far and severe the pain is significant and interfering with normal daily activities I believe it is reasonable and appropriate to continue the controlled substance in order to improve his ability to function in enhance quality of life the patient has signed a narcotic agreement patient should it was utilize 1 pharmacy and not receiving narcotic from any other provider patient verbalizes understanding that breaching the contract will affect future prescription decision making and critical results in the dismissal from the practice if specifically the has been explained in understood to patient patient demonstrates following informed use of appropriate medicine to analgesia increase activity explained the side effects recommended that thisto the patient patient understands that also advised that appeared to expose him order an abuse and addiction and overdose counseling provided for prevention of any overuse abuse or addiction     Orders:  •  oxyCODONE-acetaminophen (PERCOCET)  mg per tablet; Take 1 tablet by mouth every 8 (eight) hours as needed for severe pain Max Daily Amount: 3 tablets     Lumbar foraminal stenosis  Same as under  lumbar spondylosis with myelopathy      Orders:  •  oxyCODONE-acetaminophen (PERCOCET)  mg per tablet; Take 1 tablet by mouth every 8 (eight) hours as needed for severe pain Max Daily Amount: 3 tablets     Lumbar spondylosis with myelopathy  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 remaining follow-up plan finding as follows    All medical records reviewed and reconciled patient PDMP database reviewed to ensure compliant with the treatment plan for pain management benefits due to the fact that patient has not responded to other measures for pain control so far and severe the pain is significant and interfering with normal daily activities I believe it is reasonable and appropriate to continue the controlled substance in order to improve his ability to function in enhance quality of life the patient has signed a narcotic agreement patient should it was utilize 1 pharmacy and not receiving narcotic from any other provider patient verbalizes understanding that breaching the contract will affect future prescription decision making and critical results in the dismissal from the practice if specifically the has been explained in understood to patient patient demonstrates following informed use of appropriate medicine to analgesia increase activity explained the side effects recommended that thisto the patient patient understands that also advised that appeared to expose him order an abuse and addiction and overdose counseling provided for prevention of any overuse abuse or addiction     Orders:  •  oxyCODONE-acetaminophen (PERCOCET)  mg per tablet; Take 1 tablet by mouth every 8 (eight) hours as needed for severe pain Max Daily Amount: 3 tablets     Anxiety and depression  Patient claims been feeling stressed out on  Lexapro 20 mg which is helping anxiety but mildly depressed patient claims absolutely no intent of harming himself or anybody but had taken Cymbalta in the past was helping pain and also was helping the mild depression so restarted at 30 mg daily explained the side effects  See attached depression screening done    PHQ-2/9 Depression Screening    Little interest or pleasure in doing things: 1 - several days  Feeling down, depressed, or hopeless: 1 - several days  Trouble falling or staying asleep, or sleeping too much: 0 - not at all  Feeling tired or having little energy: 1 - several days  Poor appetite or overeatin - not at all  Feeling bad about yourself - or that you are a failure or have let yourself or your family down: 0 - not at all  Trouble concentrating on things, such as reading the newspaper or watching television: 1 - several days  Moving or speaking so slowly that other people could have noticed. Or the opposite - being so fidgety or restless that you have been moving around a lot more than usual: 0 - not at all  Thoughts that you would be better off dead, or of hurting yourself in some way: 0 - not at all  PHQ-9 Score: 4  PHQ-9 Interpretation: No or Minimal depression        Orders:  •  DULoxetine (CYMBALTA) 30 mg delayed release capsule; Take 1 capsule (30 mg total) by mouth daily           History of Present Illness   Came in for follow-up for refill for Percocet 10-3 25 3 times a day was refilled and management of chronic interval pain for details refer to assessment plan visit diagnosis patient claims lately been very anxious and intermittently been having symptoms of mild depression please see attached depression screening      Review of Systems   Constitutional:  Positive for activity change. Negative for appetite change, chills, diaphoresis, fatigue, fever and unexpected weight change.   HENT:  Negative for congestion, dental problem, drooling, ear discharge, ear pain, facial swelling,  "hearing loss, mouth sores, nosebleeds, postnasal drip, rhinorrhea, sinus pressure, sneezing, sore throat, tinnitus, trouble swallowing and voice change.    Eyes:  Negative for photophobia, pain, discharge, redness, itching and visual disturbance.   Respiratory:  Negative for apnea, cough, choking, chest tightness, shortness of breath, wheezing and stridor.    Cardiovascular:  Negative for chest pain, palpitations and leg swelling.   Gastrointestinal:  Negative for abdominal distention, abdominal pain, anal bleeding, blood in stool, constipation, diarrhea, nausea, rectal pain and vomiting.   Endocrine: Negative for cold intolerance, heat intolerance, polydipsia, polyphagia and polyuria.   Genitourinary:  Negative for decreased urine volume, difficulty urinating, dysuria, enuresis, flank pain, frequency, genital sores, hematuria and urgency.   Musculoskeletal:  Positive for arthralgias, back pain, gait problem, joint swelling, myalgias and neck pain. Negative for neck stiffness.   Skin:  Negative for color change, pallor, rash and wound.   Allergic/Immunologic: Negative.  Negative for environmental allergies, food allergies and immunocompromised state.   Neurological:  Negative for dizziness, tremors, seizures, syncope, facial asymmetry, speech difficulty, weakness, light-headedness, numbness and headaches.   Psychiatric/Behavioral:  Negative for agitation, behavioral problems, confusion, decreased concentration, dysphoric mood, hallucinations, self-injury, sleep disturbance and suicidal ideas. The patient is not nervous/anxious and is not hyperactive.        Objective   /80   Pulse 82   Ht 5' 11\" (1.803 m)   Wt 103 kg (228 lb)   SpO2 97%   BMI 31.80 kg/m²      Physical Exam  Vitals and nursing note reviewed.   Constitutional:       General: He is not in acute distress.     Appearance: He is well-developed. He is not ill-appearing, toxic-appearing or diaphoretic.   HENT:      Head: Normocephalic and " atraumatic.      Right Ear: External ear normal.      Left Ear: External ear normal.      Nose: Nose normal.      Mouth/Throat:      Pharynx: No oropharyngeal exudate.     Eyes:      General: Lids are normal. Lids are everted, no foreign bodies appreciated. No scleral icterus.        Right eye: No discharge.         Left eye: No discharge.      Conjunctiva/sclera: Conjunctivae normal.      Pupils: Pupils are equal, round, and reactive to light.     Neck:      Thyroid: No thyromegaly.      Vascular: Normal carotid pulses. No carotid bruit, hepatojugular reflux or JVD.      Trachea: No tracheal tenderness or tracheal deviation.     Cardiovascular:      Rate and Rhythm: Normal rate and regular rhythm.      Pulses: Normal pulses.      Heart sounds: Normal heart sounds. No murmur heard.     No friction rub. No gallop.   Pulmonary:      Effort: Pulmonary effort is normal. No respiratory distress.      Breath sounds: Normal breath sounds. No stridor. No wheezing or rales.   Chest:      Chest wall: No tenderness.   Abdominal:      General: Bowel sounds are normal. There is no distension.      Palpations: Abdomen is soft. There is no mass.      Tenderness: There is no abdominal tenderness. There is no guarding or rebound.     Musculoskeletal:         General: No tenderness or deformity. Normal range of motion.      Cervical back: Normal range of motion and neck supple. No edema, erythema or rigidity. No spinous process tenderness or muscular tenderness. Normal range of motion.   Lymphadenopathy:      Head:      Right side of head: No submental, submandibular, tonsillar, preauricular or posterior auricular adenopathy.      Left side of head: No submental, submandibular, tonsillar, preauricular, posterior auricular or occipital adenopathy.      Cervical: No cervical adenopathy.      Right cervical: No superficial, deep or posterior cervical adenopathy.     Left cervical: No superficial, deep or posterior cervical adenopathy.       Upper Body:      Right upper body: No pectoral adenopathy.      Left upper body: No pectoral adenopathy.     Skin:     General: Skin is warm and dry.      Coloration: Skin is not pale.      Findings: No erythema or rash.     Neurological:      General: No focal deficit present.      Mental Status: He is alert and oriented to person, place, and time.      Cranial Nerves: No cranial nerve deficit.      Sensory: No sensory deficit.      Motor: No tremor, abnormal muscle tone or seizure activity.      Coordination: Coordination normal.      Gait: Gait abnormal.      Deep Tendon Reflexes: Reflexes are normal and symmetric. Reflexes normal.     Psychiatric:         Behavior: Behavior normal.         Thought Content: Thought content normal.         Judgment: Judgment normal.

## 2025-05-28 NOTE — ASSESSMENT & PLAN NOTE
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 no side effects denied NSAID overuse abuse addiction    Counseling done as follows    All medical records reviewed and reconciled patient PDMP database reviewed to ensure compliant with the treatment plan for pain management benefits due to the fact that patient has not responded to other measures for pain control so far and severe the pain is significant and interfering with normal daily activities I believe it is reasonable and appropriate to continue the controlled substance in order to improve his ability to function in enhance quality of life the patient has signed a narcotic agreement patient should it was utilize 1 pharmacy and not receiving narcotic from any other provider patient verbalizes understanding that breaching the contract will affect future prescription decision making and critical results in the dismissal from the practice if specifically the has been explained in understood to patient patient demonstrates following informed use of appropriate medicine to analgesia increase activity explained the side effects recommended that thisto the patient patient understands that also advised that appeared to expose him order an abuse and addiction and overdose counseling provided for prevention of any overuse abuse or addiction     Orders:  •  oxyCODONE-acetaminophen (PERCOCET)  mg per tablet; Take 1 tablet by mouth every 8 (eight) hours as needed for severe pain Max Daily Amount: 3 tablets

## 2025-05-28 NOTE — ASSESSMENT & PLAN NOTE
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 remaining follow-up plan finding as follows    All medical records reviewed and reconciled patient PDMP database reviewed to ensure compliant with the treatment plan for pain management benefits due to the fact that patient has not responded to other measures for pain control so far and severe the pain is significant and interfering with normal daily activities I believe it is reasonable and appropriate to continue the controlled substance in order to improve his ability to function in enhance quality of life the patient has signed a narcotic agreement patient should it was utilize 1 pharmacy and not receiving narcotic from any other provider patient verbalizes understanding that breaching the contract will affect future prescription decision making and critical results in the dismissal from the practice if specifically the has been explained in understood to patient patient demonstrates following informed use of appropriate medicine to analgesia increase activity explained the side effects recommended that thisto the patient patient understands that also advised that appeared to expose him order an abuse and addiction and overdose counseling provided for prevention of any overuse abuse or addiction     Orders:  •  oxyCODONE-acetaminophen (PERCOCET)  mg per tablet; Take 1 tablet by mouth every 8 (eight) hours as needed for severe pain Max Daily Amount: 3 tablets

## 2025-05-28 NOTE — PATIENT INSTRUCTIONS
"Patient Education     Chronic pain   The Basics   Written by the doctors and editors at [facility]   What is chronic pain? -- This is pain that lasts longer than 3 to 6 months. In many cases, this means that pain continues even after the injury or condition that first caused it has healed.  Pain can affect the body in different ways. For example, pain can be:   An ache deep inside the muscle or bone   Stabbing or shooting, often with tingling or numbness   Dull and throbbing  People who have chronic pain might have a hard time doing their usual activities, such as bathing or dressing. This can lead to depression and anxiety. It can also cause problems with sleep.  What causes chronic pain? -- It is not always clear. Sometimes, it is caused by an ongoing medical problem, such as arthritis or nerve damage from diabetes. But doctors cannot always find the cause of chronic pain.  In some cases, people with chronic pain must accept that their pain will never be explained. This means that they have to work with their health care team to address the pain, even if they don't know its cause.  Will I need tests? -- Your doctor might do tests to figure out the cause of your pain. For example, you might get:   Blood tests - These can check for infection, signs of inflammation, or diseases that can cause pain.   X-rays or other imaging tests - Imaging tests create pictures of the inside of the body. They can check for bone fractures, joint damage, cancer, or other changes in your body that could cause pain.   Nerve tests - These are ways to check whether the nerves are working normally.  However, tests cannot always show the cause of pain. Scientists think that in some people, the pain signals in the brain stop working normally. The signals get \"stuck\" in the on position, even when the source of pain is gone.  How is chronic pain treated? -- There are many different ways to manage chronic pain. Most people need to try different " combinations. The right approach for you depends on your pain as well as your lifestyle and preferences.  Treatment options include:   Non-medicine techniques - Doctors recommend first trying to manage pain without medicines. This can involve lots of different things, such as:   Working with a counselor or therapist   Physical therapy or an exercise program   Lifestyle and behavior changes to improve your mood, sleep, diet, and stress level   Acupuncture   Massage   Ice or heat   Devices that affect nerve signals   Medicines - When the above methods are not enough to relieve pain, doctors can recommend medicines. Different medicines work in different ways. Some pain medicines, like opioids, are not recommended for treating chronic pain in most cases. That's because they come with serious risks. Doctors usually try other medicines first.  If your doctor suggests a medicine that seems strange, keep an open mind. Sometimes, doctors treat pain with medicines made to treat other medical problems. For example, medicines for depression can help with pain, because they work on areas of the brain that process pain. Doctors can also use medicines for seizures to treat pain, because they help with overactive nerves.   Other treatments - These might include:   Injections (shots) of numbing or pain-relieving medicines   Surgery  To find the best treatment for you:   Be open to trying new treatments and combinations of treatments. Sometimes, you have to try a few different options before you find one that works best.   Set realistic goals for your treatment. Even if you can't completely get rid of your pain, you might be able to control it enough so you can do the activities you like.  Is there anything I can do on my own to feel better? -- Yes. It can help to:   Try things like heat, cold, or massage - Depending on the cause of your pain, these things might help. Check with your doctor to make sure that it is OK for your  condition.   Practice relaxing - You can learn methods to relax your body, such as doing deep breathing exercises. Ask your doctor or nurse about these methods. Relaxing the mind can help with how the body feels pain. People can learn to quiet their pain or make it less bothersome. It can also help to make changes to improve your sleep habits.   Stay as active as possible - Walking, swimming, anju chi (a kind of martial art), or biking can all help ease muscle and joint pain. Even gentle forms of exercise are good for your health. If you are not active, your pain might get worse.  If you haven't been active for a while, start slowly. Make small increases in the intensity and amount of time you spend exercising. If exercising makes your pain worse, talk with your doctor. They might recommend a program that can help you get more active.  What medical care will I need? -- Many people need a team to help manage their care. A treatment team usually includes:   Doctors or specialists   A physical therapist   Someone trained in mental health, like a  or counselor  Your team will probably want to see you regularly:   They will ask you questions about your pain and other symptoms. They will also work with you to learn how treatment is working and make changes if needed.   They will ask you about your mood and your mental health. Depression and chronic pain can make each other worse. If you have depression, they might suggest treatment for it.  As you get better at managing your pain, you might see your team less frequently.  All topics are updated as new evidence becomes available and our peer review process is complete.  This topic retrieved from [employer] on: Mar 29, 2024.  Topic 85321 Version 18.0  Release: 32.2.4 - C32.87  © 2024 [employer] and/or its affiliates. All rights reserved.  Consumer Information Use and Disclaimer   Disclaimer: This generalized information is a limited summary of diagnosis,  treatment, and/or medication information. It is not meant to be comprehensive and should be used as a tool to help the user understand and/or assess potential diagnostic and treatment options. It does NOT include all information about conditions, treatments, medications, side effects, or risks that may apply to a specific patient. It is not intended to be medical advice or a substitute for the medical advice, diagnosis, or treatment of a health care provider based on the health care provider's examination and assessment of a patient's specific and unique circumstances. Patients must speak with a health care provider for complete information about their health, medical questions, and treatment options, including any risks or benefits regarding use of medications. This information does not endorse any treatments or medications as safe, effective, or approved for treating a specific patient. Up[employer], Inc[employer] and its affiliates disclaim any warranty or liability relating to this information or the use thereof.The use of this information is governed by the Terms of Use, available at https://www.woltersExploretripuwer.com/en/know/clinical-effectiveness-terms. 2024© Up[employer]Date, Inc. and its affiliates and/or licensors. All rights reserved.  Copyright   © 2024 [employer] and/or its affiliates. All rights reserved.

## 2025-06-25 ENCOUNTER — OFFICE VISIT (OUTPATIENT)
Dept: INTERNAL MEDICINE CLINIC | Facility: CLINIC | Age: 60
End: 2025-06-25
Payer: COMMERCIAL

## 2025-06-25 VITALS
DIASTOLIC BLOOD PRESSURE: 70 MMHG | WEIGHT: 223 LBS | BODY MASS INDEX: 31.22 KG/M2 | OXYGEN SATURATION: 98 % | SYSTOLIC BLOOD PRESSURE: 110 MMHG | HEIGHT: 71 IN | HEART RATE: 72 BPM

## 2025-06-25 DIAGNOSIS — M51.26 LUMBAR DISC HERNIATION: ICD-10-CM

## 2025-06-25 DIAGNOSIS — M48.061 LUMBAR FORAMINAL STENOSIS: ICD-10-CM

## 2025-06-25 DIAGNOSIS — M47.16 LUMBAR SPONDYLOSIS WITH MYELOPATHY: Primary | ICD-10-CM

## 2025-06-25 DIAGNOSIS — L23.7 ALLERGIC DERMATITIS DUE TO POISON IVY: ICD-10-CM

## 2025-06-25 PROCEDURE — 99213 OFFICE O/P EST LOW 20 MIN: CPT | Performed by: INTERNAL MEDICINE

## 2025-06-25 RX ORDER — OXYCODONE AND ACETAMINOPHEN 10; 325 MG/1; MG/1
1 TABLET ORAL EVERY 8 HOURS PRN
Qty: 90 TABLET | Refills: 0 | Status: SHIPPED | OUTPATIENT
Start: 2025-06-25

## 2025-06-25 RX ORDER — METHYLPREDNISOLONE 4 MG/1
TABLET ORAL
Qty: 21 EACH | Refills: 0 | Status: SHIPPED | OUTPATIENT
Start: 2025-06-25

## 2025-06-25 NOTE — ASSESSMENT & PLAN NOTE
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 no side effects no evidence of any overuse abuse addiction Percocet 10-3 25 every 8 hours was refilled    Counseling done as follows    All medical records reviewed and reconciled patient PDMP database reviewed to ensure compliant with the treatment plan for pain management benefits due to the fact that patient has not responded to other measures for pain control so far and severe the pain is significant and interfering with normal daily activities I believe it is reasonable and appropriate to continue the controlled substance in order to improve his ability to function in enhance quality of life the patient has signed a narcotic agreement patient should it was utilize 1 pharmacy and not receiving narcotic from any other provider patient verbalizes understanding that breaching the contract will affect future prescription decision making and critical results in the dismissal from the practice if specifically the has been explained in understood to patient patient demonstrates following informed use of appropriate medicine to analgesia increase activity explained the side effects recommended that thisto the patient patient understands that also advised that appeared to expose him order an abuse and addiction and overdose counseling provided for prevention of any overuse abuse or addiction     Orders:  •  oxyCODONE-acetaminophen (PERCOCET)  mg per tablet; Take 1 tablet by mouth every 8 (eight) hours as needed for severe pain Max Daily Amount: 3 tablets

## 2025-06-25 NOTE — PROGRESS NOTES
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 no side effects no evidence of any overuse abuse addiction Percocet 10-3 25 every 8 hours was refilled    Counseling done as follows    All medical records reviewed and reconciled patient PDMP database reviewed to ensure compliant with the treatment plan for pain management benefits due to the fact that patient has not responded to other measures for pain control so far and severe the pain is significant and interfering with normal daily activities I believe it is reasonable and appropriate to continue the controlled substance in order to improve his ability to function in enhance quality of life the patient has signed a narcotic agreement patient should it was utilize 1 pharmacy and not receiving narcotic from any other provider patient verbalizes understanding that breaching the contract will affect future prescription decision making and critical results in the dismissal from the practice if specifically the has been explained in understood to patient patient demonstrates following informed use of appropriate medicine to analgesia increase activity explained the side effects recommended that thisto the patient patient understands that also advised that appeared to expose him order an abuse and addiction and overdose counseling provided for prevention of any overuse abuse or addiction     Orders:  •  oxyCODONE-acetaminophen (PERCOCET)  mg per tablet; Take 1 tablet by mouth every 8 (eight) hours as needed for severe pain Max Daily Amount: 3  tablets     Lumbar foraminal stenosis  Same as under lumbar spondylosis with myelopathy      Orders:  •  oxyCODONE-acetaminophen (PERCOCET)  mg per tablet; Take 1 tablet by mouth every 8 (eight) hours as needed for severe pain Max Daily Amount: 3 tablets     Lumbar spondylosis with myelopathy  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 remaining follow-up plan finding as follows    All medical records reviewed and reconciled patient PDMP database reviewed to ensure compliant with the treatment plan for pain management benefits due to the fact that patient has not responded to other measures for pain control so far and severe the pain is significant and interfering with normal daily activities I believe it is reasonable and appropriate to continue the controlled substance in order to improve his ability to function in enhance quality of life the patient has signed a narcotic agreement patient should it was utilize 1 pharmacy and not receiving narcotic from any other provider patient verbalizes understanding that breaching the contract will affect future prescription decision making and critical results in the dismissal from the practice if specifically the has been explained in understood to patient patient demonstrates following informed use of appropriate medicine to analgesia increase activity explained the side effects recommended that thisto the patient patient understands that also advised that appeared to expose him order an abuse and addiction and overdose counseling provided for prevention of any overuse abuse or addiction     Orders:  •  oxyCODONE-acetaminophen (PERCOCET)  mg per tablet; Take 1 tablet by mouth every 8 (eight) hours as needed for severe pain Max Daily Amount: 3 tablets     Allergic  dermatitis due to poison ivy  Rash over the face around the lips for some itching erythematous came in contact poison ivy    Medrol Dosepak to use as directed    Benadryl 12.5-3 times a day as needed  Orders:  •  methylPREDNISolone 4 MG tablet therapy pack; Use as directed on package           History of Present Illness   Came in for follow-up for the management chronic interval pain tolerable rash on the lip and face itching came in contact with poison ivy being treated with Medrol Dosepak for details refer to assessment plan visit diagnosis      Review of Systems   Constitutional:  Positive for activity change. Negative for appetite change, chills, diaphoresis, fatigue, fever and unexpected weight change.   HENT:  Negative for congestion, dental problem, drooling, ear discharge, ear pain, facial swelling, hearing loss, mouth sores, nosebleeds, postnasal drip, rhinorrhea, sinus pressure, sneezing, sore throat, tinnitus, trouble swallowing and voice change.    Eyes:  Negative for photophobia, pain, discharge, redness, itching and visual disturbance.   Respiratory:  Negative for apnea, cough, choking, chest tightness, shortness of breath, wheezing and stridor.    Cardiovascular:  Negative for chest pain, palpitations and leg swelling.   Gastrointestinal:  Negative for abdominal distention, abdominal pain, anal bleeding, blood in stool, constipation, diarrhea, nausea, rectal pain and vomiting.   Endocrine: Negative for cold intolerance, heat intolerance, polydipsia, polyphagia and polyuria.   Genitourinary:  Negative for decreased urine volume, difficulty urinating, dysuria, enuresis, flank pain, frequency, genital sores, hematuria and urgency.   Musculoskeletal:  Positive for arthralgias, back pain, gait problem, joint swelling, myalgias and neck pain. Negative for neck stiffness.   Skin:  Positive for rash. Negative for color change, pallor and wound.   Allergic/Immunologic: Negative.  Negative for environmental  "allergies, food allergies and immunocompromised state.   Neurological:  Negative for dizziness, tremors, seizures, syncope, facial asymmetry, speech difficulty, weakness, light-headedness, numbness and headaches.   Psychiatric/Behavioral:  Negative for agitation, behavioral problems, confusion, decreased concentration, dysphoric mood, hallucinations, self-injury, sleep disturbance and suicidal ideas. The patient is not nervous/anxious and is not hyperactive.        Objective   /70   Pulse 72   Ht 5' 11\" (1.803 m)   Wt 101 kg (223 lb)   SpO2 98%   BMI 31.10 kg/m²      Physical Exam  Vitals and nursing note reviewed.   Constitutional:       General: He is not in acute distress.     Appearance: He is well-developed. He is not ill-appearing, toxic-appearing or diaphoretic.   HENT:      Head: Normocephalic and atraumatic.      Right Ear: External ear normal.      Left Ear: External ear normal.      Nose: Nose normal.      Mouth/Throat:      Pharynx: No oropharyngeal exudate.     Eyes:      General: Lids are normal. Lids are everted, no foreign bodies appreciated. No scleral icterus.        Right eye: No discharge.         Left eye: No discharge.      Conjunctiva/sclera: Conjunctivae normal.      Pupils: Pupils are equal, round, and reactive to light.     Neck:      Thyroid: No thyromegaly.      Vascular: Normal carotid pulses. No carotid bruit, hepatojugular reflux or JVD.      Trachea: No tracheal tenderness or tracheal deviation.     Cardiovascular:      Rate and Rhythm: Normal rate and regular rhythm.      Pulses: Normal pulses.      Heart sounds: Normal heart sounds. No murmur heard.     No friction rub. No gallop.   Pulmonary:      Effort: Pulmonary effort is normal. No respiratory distress.      Breath sounds: Normal breath sounds. No stridor. No wheezing or rales.   Chest:      Chest wall: No tenderness.   Abdominal:      General: Bowel sounds are normal. There is no distension.      Palpations: Abdomen " is soft. There is no mass.      Tenderness: There is no abdominal tenderness. There is no guarding or rebound.     Musculoskeletal:         General: No tenderness or deformity. Normal range of motion.      Cervical back: Normal range of motion and neck supple. No edema, erythema or rigidity. No spinous process tenderness or muscular tenderness. Normal range of motion.   Lymphadenopathy:      Head:      Right side of head: No submental, submandibular, tonsillar, preauricular or posterior auricular adenopathy.      Left side of head: No submental, submandibular, tonsillar, preauricular, posterior auricular or occipital adenopathy.      Cervical: No cervical adenopathy.      Right cervical: No superficial, deep or posterior cervical adenopathy.     Left cervical: No superficial, deep or posterior cervical adenopathy.      Upper Body:      Right upper body: No pectoral adenopathy.      Left upper body: No pectoral adenopathy.     Skin:     General: Skin is warm and dry.      Coloration: Skin is not pale.      Findings: Rash present. No erythema.     Neurological:      General: No focal deficit present.      Mental Status: He is alert and oriented to person, place, and time.      Cranial Nerves: No cranial nerve deficit.      Sensory: No sensory deficit.      Motor: No tremor, abnormal muscle tone or seizure activity.      Coordination: Coordination normal.      Gait: Gait abnormal.      Deep Tendon Reflexes: Reflexes are normal and symmetric. Reflexes normal.     Psychiatric:         Behavior: Behavior normal.         Thought Content: Thought content normal.         Judgment: Judgment normal.

## 2025-07-23 ENCOUNTER — OFFICE VISIT (OUTPATIENT)
Dept: INTERNAL MEDICINE CLINIC | Facility: CLINIC | Age: 60
End: 2025-07-23
Payer: COMMERCIAL

## 2025-07-23 VITALS
HEIGHT: 71 IN | SYSTOLIC BLOOD PRESSURE: 122 MMHG | BODY MASS INDEX: 31.64 KG/M2 | DIASTOLIC BLOOD PRESSURE: 80 MMHG | HEART RATE: 75 BPM | WEIGHT: 226 LBS | OXYGEN SATURATION: 97 %

## 2025-07-23 DIAGNOSIS — M51.26 LUMBAR DISC HERNIATION: ICD-10-CM

## 2025-07-23 DIAGNOSIS — M47.16 LUMBAR SPONDYLOSIS WITH MYELOPATHY: Primary | ICD-10-CM

## 2025-07-23 DIAGNOSIS — M48.061 LUMBAR FORAMINAL STENOSIS: ICD-10-CM

## 2025-07-23 PROCEDURE — 99213 OFFICE O/P EST LOW 20 MIN: CPT | Performed by: INTERNAL MEDICINE

## 2025-07-23 RX ORDER — OXYCODONE AND ACETAMINOPHEN 10; 325 MG/1; MG/1
1 TABLET ORAL EVERY 8 HOURS PRN
Qty: 90 TABLET | Refills: 0 | Status: SHIPPED | OUTPATIENT
Start: 2025-07-23

## 2025-07-23 NOTE — ASSESSMENT & PLAN NOTE
Evaluated by pain management for more than 2 years ago underwent epidural injection multiple underwent physical therapy multiple times with minimal improvement recommended surgery patient working full-time and prefers to continue working under the circumstances for now Percocet 10-3 25 4 times a day refill helping patient work full-time care of day-to-day activities and postpone the surgery remaining follow-up plan finding as follows previous progress note    All medical records reviewed and reconciled patient PDMP database reviewed to ensure compliant with the treatment plan for pain management benefits due to the fact that patient has not responded to other measures for pain control so far and severe the pain is significant and interfering with normal daily activities I believe it is reasonable and appropriate to continue the controlled substance in order to improve his ability to function in enhance quality of life the patient has signed a narcotic agreement patient should it was utilize 1 pharmacy and not receiving narcotic from any other provider patient verbalizes understanding that breaching the contract will affect future prescription decision making and critical results in the dismissal from the practice if specifically the has been explained in understood to patient patient demonstrates following informed use of appropriate medicine to analgesia increase activity explained the side effects recommended that thisto the patient patient understands that also advised that appeared to expose him order an abuse and addiction and overdose counseling provided for prevention of any overuse abuse or addiction     Orders:  •  oxyCODONE-acetaminophen (PERCOCET)  mg per tablet; Take 1 tablet by mouth every 8 (eight) hours as needed for severe pain Max Daily Amount: 3 tablets

## 2025-07-23 NOTE — PROGRESS NOTES
Name: Ifeanyi Puga      : 1965      MRN: [id number]  Encounter Provider: Wilmar Bradford MD  Encounter Date: 2025   Encounter department: [facility] INTERNAL MEDICINE  :  Assessment & Plan  Lumbar disc herniation  Same as under lumbar spondylosis with myelopathy  Orders:  •  oxyCODONE-acetaminophen (PERCOCET)  mg per tablet; Take 1 tablet by mouth every 8 (eight) hours as needed for severe pain Max Daily Amount: 3 tablets    Lumbar foraminal stenosis  Same as under lumbar spondylosis with myelopathy Percocet 10-3 25 4 times a day was refilled  Orders:  •  oxyCODONE-acetaminophen (PERCOCET)  mg per tablet; Take 1 tablet by mouth every 8 (eight) hours as needed for severe pain Max Daily Amount: 3 tablets    Lumbar spondylosis with myelopathy  Evaluated by pain management for more than 2 years ago underwent epidural injection multiple underwent physical therapy multiple times with minimal improvement recommended surgery patient working full-time and prefers to continue working under the circumstances for now Percocet 10-3 25 4 times a day refill helping patient work full-time care of day-to-day activities and postpone the surgery remaining follow-up plan finding as follows previous progress note    All medical records reviewed and reconciled patient PDMP database reviewed to ensure compliant with the treatment plan for pain management benefits due to the fact that patient has not responded to other measures for pain control so far and severe the pain is significant and interfering with normal daily activities I believe it is reasonable and appropriate to continue the controlled substance in order to improve his ability to function in enhance quality of life the patient has signed a narcotic agreement patient should it was utilize 1 pharmacy and not receiving narcotic from any other provider patient verbalizes understanding that breaching the contract will affect future  prescription decision making and critical results in the dismissal from the practice if specifically the has been explained in understood to patient patient demonstrates following informed use of appropriate medicine to analgesia increase activity explained the side effects recommended that thisto the patient patient understands that also advised that appeared to expose him order an abuse and addiction and overdose counseling provided for prevention of any overuse abuse or addiction     Orders:  •  oxyCODONE-acetaminophen (PERCOCET)  mg per tablet; Take 1 tablet by mouth every 8 (eight) hours as needed for severe pain Max Daily Amount: 3 tablets            History of Present Illness   Came in follow-up chronic medical clinic especially management chronic interval pain refill for Percocet was refilled for detail refer to assessment plan visit diagnosis      Review of Systems   Constitutional:  Positive for activity change. Negative for appetite change, chills, diaphoresis, fatigue, fever and unexpected weight change.   HENT:  Negative for congestion, dental problem, drooling, ear discharge, ear pain, facial swelling, hearing loss, mouth sores, nosebleeds, postnasal drip, rhinorrhea, sinus pressure, sneezing, sore throat, tinnitus, trouble swallowing and voice change.    Eyes:  Negative for photophobia, pain, discharge, redness, itching and visual disturbance.   Respiratory:  Negative for apnea, cough, choking, chest tightness, shortness of breath, wheezing and stridor.    Cardiovascular:  Negative for chest pain, palpitations and leg swelling.   Gastrointestinal:  Negative for abdominal distention, abdominal pain, anal bleeding, blood in stool, constipation, diarrhea, nausea, rectal pain and vomiting.   Endocrine: Negative for cold intolerance, heat intolerance, polydipsia, polyphagia and polyuria.   Genitourinary:  Negative for decreased urine volume, difficulty urinating, dysuria, enuresis, flank pain,  "frequency, genital sores, hematuria and urgency.   Musculoskeletal:  Positive for arthralgias, back pain, gait problem, joint swelling, myalgias and neck pain. Negative for neck stiffness.   Skin:  Negative for color change, pallor and wound.   Allergic/Immunologic: Negative.  Negative for environmental allergies, food allergies and immunocompromised state.   Neurological:  Negative for dizziness, tremors, seizures, syncope, facial asymmetry, speech difficulty, weakness, light-headedness, numbness and headaches.   Psychiatric/Behavioral:  Negative for agitation, behavioral problems, confusion, decreased concentration, dysphoric mood, hallucinations, self-injury, sleep disturbance and suicidal ideas. The patient is not nervous/anxious and is not hyperactive.        Objective   /80   Pulse 75   Ht 5' 11\" (1.803 m)   Wt 103 kg (226 lb)   SpO2 97%   BMI 31.52 kg/m²      Physical Exam  Vitals and nursing note reviewed.   Constitutional:       General: He is not in acute distress.     Appearance: He is well-developed. He is not ill-appearing, toxic-appearing or diaphoretic.   HENT:      Head: Normocephalic and atraumatic.      Right Ear: External ear normal.      Left Ear: External ear normal.      Nose: Nose normal.      Mouth/Throat:      Pharynx: No oropharyngeal exudate.     Eyes:      General: Lids are normal. Lids are everted, no foreign bodies appreciated. No scleral icterus.        Right eye: No discharge.         Left eye: No discharge.      Conjunctiva/sclera: Conjunctivae normal.      Pupils: Pupils are equal, round, and reactive to light.     Neck:      Thyroid: No thyromegaly.      Vascular: Normal carotid pulses. No carotid bruit, hepatojugular reflux or JVD.      Trachea: No tracheal tenderness or tracheal deviation.     Cardiovascular:      Rate and Rhythm: Normal rate and regular rhythm.      Pulses: Normal pulses.      Heart sounds: Normal heart sounds. No murmur heard.     No friction rub. No " gallop.   Pulmonary:      Effort: Pulmonary effort is normal. No respiratory distress.      Breath sounds: Normal breath sounds. No stridor. No wheezing or rales.   Chest:      Chest wall: No tenderness.   Abdominal:      General: Bowel sounds are normal. There is no distension.      Palpations: Abdomen is soft. There is no mass.      Tenderness: There is no abdominal tenderness. There is no guarding or rebound.     Musculoskeletal:         General: No tenderness or deformity. Normal range of motion.      Cervical back: Normal range of motion and neck supple. No edema, erythema or rigidity. No spinous process tenderness or muscular tenderness. Normal range of motion.   Lymphadenopathy:      Head:      Right side of head: No submental, submandibular, tonsillar, preauricular or posterior auricular adenopathy.      Left side of head: No submental, submandibular, tonsillar, preauricular, posterior auricular or occipital adenopathy.      Cervical: No cervical adenopathy.      Right cervical: No superficial, deep or posterior cervical adenopathy.     Left cervical: No superficial, deep or posterior cervical adenopathy.      Upper Body:      Right upper body: No pectoral adenopathy.      Left upper body: No pectoral adenopathy.     Skin:     General: Skin is warm and dry.      Coloration: Skin is not pale.      Findings: No erythema or rash.     Neurological:      General: No focal deficit present.      Mental Status: He is alert and oriented to person, place, and time.      Cranial Nerves: No cranial nerve deficit.      Sensory: No sensory deficit.      Motor: No tremor, abnormal muscle tone or seizure activity.      Coordination: Coordination normal.      Gait: Gait abnormal.      Deep Tendon Reflexes: Reflexes are normal and symmetric. Reflexes normal.     Psychiatric:         Behavior: Behavior normal.         Thought Content: Thought content normal.         Judgment: Judgment normal.

## 2025-07-23 NOTE — ASSESSMENT & PLAN NOTE
Same as under lumbar spondylosis with myelopathy Percocet 10-3 25 4 times a day was refilled  Orders:  •  oxyCODONE-acetaminophen (PERCOCET)  mg per tablet; Take 1 tablet by mouth every 8 (eight) hours as needed for severe pain Max Daily Amount: 3 tablets

## 2025-08-08 DIAGNOSIS — M17.0 PRIMARY OSTEOARTHRITIS OF BOTH KNEES: ICD-10-CM

## 2025-08-08 DIAGNOSIS — M51.26 LUMBAR DISC HERNIATION: ICD-10-CM

## 2025-08-08 RX ORDER — CELECOXIB 200 MG/1
200 CAPSULE ORAL 2 TIMES DAILY
Qty: 180 CAPSULE | Refills: 1 | Status: SHIPPED | OUTPATIENT
Start: 2025-08-08

## 2025-08-22 ENCOUNTER — OFFICE VISIT (OUTPATIENT)
Dept: INTERNAL MEDICINE CLINIC | Facility: CLINIC | Age: 60
End: 2025-08-22
Payer: COMMERCIAL

## 2025-08-22 VITALS
RESPIRATION RATE: 16 BRPM | TEMPERATURE: 98.4 F | HEIGHT: 71 IN | DIASTOLIC BLOOD PRESSURE: 76 MMHG | SYSTOLIC BLOOD PRESSURE: 120 MMHG | HEART RATE: 76 BPM | BODY MASS INDEX: 31.92 KG/M2 | WEIGHT: 228 LBS | OXYGEN SATURATION: 99 %

## 2025-08-22 DIAGNOSIS — M47.16 LUMBAR SPONDYLOSIS WITH MYELOPATHY: Primary | ICD-10-CM

## 2025-08-22 DIAGNOSIS — M51.26 LUMBAR DISC HERNIATION: ICD-10-CM

## 2025-08-22 DIAGNOSIS — M48.061 LUMBAR FORAMINAL STENOSIS: ICD-10-CM

## 2025-08-22 PROCEDURE — 99213 OFFICE O/P EST LOW 20 MIN: CPT | Performed by: INTERNAL MEDICINE

## 2025-08-22 RX ORDER — OXYCODONE AND ACETAMINOPHEN 10; 325 MG/1; MG/1
1 TABLET ORAL EVERY 8 HOURS PRN
Qty: 90 TABLET | Refills: 0 | Status: SHIPPED | OUTPATIENT
Start: 2025-08-22